# Patient Record
Sex: MALE | Race: ASIAN | NOT HISPANIC OR LATINO
[De-identification: names, ages, dates, MRNs, and addresses within clinical notes are randomized per-mention and may not be internally consistent; named-entity substitution may affect disease eponyms.]

---

## 2020-01-03 PROBLEM — Z00.00 ENCOUNTER FOR PREVENTIVE HEALTH EXAMINATION: Status: ACTIVE | Noted: 2020-01-03

## 2020-01-23 ENCOUNTER — APPOINTMENT (OUTPATIENT)
Dept: THORACIC SURGERY | Facility: CLINIC | Age: 85
End: 2020-01-23
Payer: MEDICARE

## 2020-01-23 VITALS
DIASTOLIC BLOOD PRESSURE: 71 MMHG | WEIGHT: 160 LBS | OXYGEN SATURATION: 95 % | BODY MASS INDEX: 24.53 KG/M2 | RESPIRATION RATE: 18 BRPM | HEIGHT: 67.72 IN | TEMPERATURE: 97.6 F | HEART RATE: 61 BPM | SYSTOLIC BLOOD PRESSURE: 122 MMHG

## 2020-01-23 DIAGNOSIS — Z82.49 FAMILY HISTORY OF ISCHEMIC HEART DISEASE AND OTHER DISEASES OF THE CIRCULATORY SYSTEM: ICD-10-CM

## 2020-01-23 DIAGNOSIS — Z86.79 PERSONAL HISTORY OF OTHER DISEASES OF THE CIRCULATORY SYSTEM: ICD-10-CM

## 2020-01-23 DIAGNOSIS — K21.9 GASTRO-ESOPHAGEAL REFLUX DISEASE W/OUT ESOPHAGITIS: ICD-10-CM

## 2020-01-23 DIAGNOSIS — Z86.59 PERSONAL HISTORY OF OTHER MENTAL AND BEHAVIORAL DISORDERS: ICD-10-CM

## 2020-01-23 DIAGNOSIS — Z86.39 PERSONAL HISTORY OF OTHER ENDOCRINE, NUTRITIONAL AND METABOLIC DISEASE: ICD-10-CM

## 2020-01-23 DIAGNOSIS — Z86.11 PERSONAL HISTORY OF TUBERCULOSIS: ICD-10-CM

## 2020-01-23 DIAGNOSIS — Z87.438 PERSONAL HISTORY OF OTHER DISEASES OF MALE GENITAL ORGANS: ICD-10-CM

## 2020-01-23 PROCEDURE — 99205 OFFICE O/P NEW HI 60 MIN: CPT

## 2020-01-27 ENCOUNTER — APPOINTMENT (OUTPATIENT)
Dept: UROLOGY | Facility: CLINIC | Age: 85
End: 2020-01-27

## 2020-01-27 PROBLEM — Z86.79 HISTORY OF HYPERTENSION: Status: RESOLVED | Noted: 2020-01-27 | Resolved: 2020-01-27

## 2020-01-27 PROBLEM — Z82.49 FAMILY HISTORY OF CARDIAC DISORDER: Status: ACTIVE | Noted: 2020-01-27

## 2020-01-27 PROBLEM — Z86.39 HISTORY OF HYPERLIPIDEMIA: Status: RESOLVED | Noted: 2020-01-27 | Resolved: 2020-01-27

## 2020-01-27 PROBLEM — Z87.438 HISTORY OF BENIGN PROSTATIC HYPERPLASIA: Status: RESOLVED | Noted: 2020-01-27 | Resolved: 2020-01-27

## 2020-01-27 PROBLEM — K21.9 CHRONIC GERD: Status: RESOLVED | Noted: 2020-01-27 | Resolved: 2020-01-27

## 2020-01-27 PROBLEM — Z86.11 HISTORY OF TUBERCULOSIS: Status: RESOLVED | Noted: 2020-01-27 | Resolved: 2020-01-27

## 2020-01-27 PROBLEM — Z86.79 HISTORY OF CARDIAC ARRHYTHMIA: Status: RESOLVED | Noted: 2020-01-27 | Resolved: 2020-01-27

## 2020-01-27 PROBLEM — Z86.59 HISTORY OF DEMENTIA: Status: RESOLVED | Noted: 2020-01-27 | Resolved: 2020-01-27

## 2020-01-27 RX ORDER — MEMANTINE HYDROCHLORIDE 10 MG/1
10 TABLET, FILM COATED ORAL
Refills: 0 | Status: ACTIVE | COMMUNITY

## 2020-01-27 RX ORDER — OMEPRAZOLE 40 MG/1
40 CAPSULE, DELAYED RELEASE ORAL
Refills: 0 | Status: ACTIVE | COMMUNITY

## 2020-01-27 RX ORDER — ALENDRONATE SODIUM 70 MG/1
70 TABLET ORAL
Refills: 0 | Status: ACTIVE | COMMUNITY

## 2020-01-27 RX ORDER — ERGOCALCIFEROL 1.25 MG/1
1.25 MG CAPSULE, LIQUID FILLED ORAL
Refills: 0 | Status: ACTIVE | COMMUNITY

## 2020-01-27 RX ORDER — TAMSULOSIN HYDROCHLORIDE 0.4 MG/1
0.4 CAPSULE ORAL
Refills: 0 | Status: ACTIVE | COMMUNITY

## 2020-01-27 RX ORDER — SIMETHICONE CHEW TAB 80 MG 80 MG
80 TABLET ORAL
Refills: 0 | Status: ACTIVE | COMMUNITY

## 2020-01-27 RX ORDER — GUAIFENESIN AND DEXTROMETHORPHAN HYDROBROMIDE 600; 30 MG/1; MG/1
30-600 TABLET, EXTENDED RELEASE ORAL
Refills: 0 | Status: ACTIVE | COMMUNITY

## 2020-01-27 RX ORDER — BRIMONIDINE TARTRATE 1 MG/ML
0.1 SOLUTION/ DROPS OPHTHALMIC
Refills: 0 | Status: ACTIVE | COMMUNITY

## 2020-01-27 RX ORDER — PRAVASTATIN SODIUM 40 MG/1
40 TABLET ORAL
Refills: 0 | Status: ACTIVE | COMMUNITY

## 2020-01-27 RX ORDER — ATENOLOL 100 MG/1
100 TABLET ORAL
Refills: 0 | Status: ACTIVE | COMMUNITY

## 2020-01-27 RX ORDER — ASPIRIN 81 MG
81 TABLET, DELAYED RELEASE (ENTERIC COATED) ORAL
Refills: 0 | Status: ACTIVE | COMMUNITY

## 2020-01-27 NOTE — ASSESSMENT
[FreeTextEntry1] : Mr. 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), abnormal heart rhythm s/p pacemaker ~2010, who presented to PCP c/o productive cough with large amount of thick yellowish sputum x 1 mo in Nov 2019, sent for CXR, then CT scan, he was prescribed Augmentin 875mg BID x 10 days but did not complete (misunderstood instruction, taking it once a day).\par \par CT Chest on 12/06/2019:\par - 5.3 x 4.0 x 4.3 cm focal consolidation within MYLES, inseparable from the posterior pleural surface and oblique fissure, triangular in shape, with air bronchograms\par - 6 mm LLL nodule (3: 95)\par - calcified right hilar LNs\par - pericardial calcifications, compatible with previous pericarditis \par \par I have reviewed the patient's medical records and diagnostic images at time of this office consultation and have made the following recommendation:\par 1. CT scan reviewed, I recommended patient to complete antibiotic as prescribed, then RTC with a repeat CT Chest w/out contrast in 6-8 wks.\par \par \par I personally performed the services described in the documentation, reviewed the documentation recorded by the scribe in my presence and it accurately and completely records my words and actions.\par \par I, Jeremías Gtz NP, am scribing for and the presence of JOHANNA Proctor, the following sections HISTORY OF PRESENT ILLNESS, PAST MEDICAL/FAMILY/SOCIAL HISTORY; REVIEW OF SYSTEMS; VITAL SIGNS; PHYSICAL EXAM; DISPOSITION.\par \par

## 2020-01-27 NOTE — CONSULT LETTER
[Dear  ___] : Dear  [unfilled], [Consult Letter:] : I had the pleasure of evaluating your patient, [unfilled]. [( Thank you for referring [unfilled] for consultation for _____ )] : Thank you for referring [unfilled] for consultation for [unfilled] [Please see my note below.] : Please see my note below. [Consult Closing:] : Thank you very much for allowing me to participate in the care of this patient.  If you have any questions, please do not hesitate to contact me. [Sincerely,] : Sincerely, [FreeTextEntry2] : Dr. Duvall (PCP/Ref) [FreeTextEntry3] : Sebas Streeter MD, MPH \par System Director of Thoracic Surgery \par Director of Comprehensive Lung and Foregut [place] \par Professor Cardiovascular & Thoracic Surgery \par [facility] [employer] at [facility]\par \par

## 2020-01-27 NOTE — HISTORY OF PRESENT ILLNESS
[FreeTextEntry1] : Mr. 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 (not treated), abnormal heart rhythm s/p pacemaker ~2010, who presented to PCP c/o productive cough with large amount of thick yellowish sputum x 1 mo in Nov 2019, sent for CXR, then CT scan, he was prescribed Augmentin 875mg BID x 10 days but did not complete (misunderstood instruction, taking it once a day).\par \par CT Chest on 12/06/2019:\par - 5.3 x 4.0 x 4.3 cm focal consolidation within MYLES, inseparable from the posterior pleural surface and oblique fissure, triangular in shape, with air bronchograms\par - 6 mm LLL nodule (3: 95)\par - calcified right hilar LNs\par - pericardial calcifications, compatible with previous pericarditis \par \par CXR on 12/30/2019: \par - persistent opacity within the left apex is identified, stable, malignancy cannot be excluded\par - Mild patchy bilateral pulmonary scarring\par - transvenous pacemaker is visualized\par \par Patient is here today for CT surgery consultation, referred by Dr. Duvall (PCP). Admits to productive cough. Denies fever, SOB or CP.\par

## 2020-01-27 NOTE — PHYSICAL EXAM
[General Appearance - Alert] : alert [General Appearance - In No Acute Distress] : in no acute distress [Sclera] : the sclera and conjunctiva were normal [PERRL With Normal Accommodation] : pupils were equal in size, round, and reactive to light [Extraocular Movements] : extraocular movements were intact [Oropharynx] : the oropharynx was normal [Neck Appearance] : the appearance of the neck was normal [Outer Ear] : the ears and nose were normal in appearance [Jugular Venous Distention Increased] : there was no jugular-venous distention [Thyroid Diffuse Enlargement] : the thyroid was not enlarged [Neck Cervical Mass (___cm)] : no neck mass was observed [Thyroid Nodule] : there were no palpable thyroid nodules [Heart Rate And Rhythm] : heart rate was normal and rhythm regular [Heart Sounds] : normal S1 and S2 [Auscultation Breath Sounds / Voice Sounds] : lungs were clear to auscultation bilaterally [Heart Sounds Pericardial Friction Rub] : no pericardial rub [Heart Sounds Gallop] : no gallops [Murmurs] : no murmurs [Examination Of The Chest] : the chest was normal in appearance [Chest Visual Inspection Thoracic Asymmetry] : no chest asymmetry [Diminished Respiratory Excursion] : normal chest expansion [2+] : left 2+ [Breast Appearance] : normal in appearance [Breast Palpation Mass] : no palpable masses [Bowel Sounds] : normal bowel sounds [Abdomen Tenderness] : non-tender [Abdomen Soft] : soft [Abdomen Mass (___ Cm)] : no abdominal mass palpated [Supraclavicular Lymph Nodes Enlarged Bilaterally] : supraclavicular [Cervical Lymph Nodes Enlarged Anterior Bilaterally] : anterior cervical [Cervical Lymph Nodes Enlarged Posterior Bilaterally] : posterior cervical [No Spinal Tenderness] : no spinal tenderness [No CVA Tenderness] : no ~M costovertebral angle tenderness [Nail Clubbing] : no clubbing  or cyanosis of the fingernails [Abnormal Walk] : normal gait [Motor Tone] : muscle strength and tone were normal [Musculoskeletal - Swelling] : no joint swelling seen [Skin Color & Pigmentation] : normal skin color and pigmentation [Deep Tendon Reflexes (DTR)] : deep tendon reflexes were 2+ and symmetric [Skin Turgor] : normal skin turgor [] : no rash [No Focal Deficits] : no focal deficits [Sensation] : the sensory exam was normal to light touch and pinprick [Oriented To Time, Place, And Person] : oriented to person, place, and time [Affect] : the affect was normal [Impaired Insight] : insight and judgment were intact [FreeTextEntry1] : deferred

## 2020-02-27 ENCOUNTER — APPOINTMENT (OUTPATIENT)
Dept: THORACIC SURGERY | Facility: CLINIC | Age: 85
End: 2020-02-27
Payer: MEDICARE

## 2020-02-27 VITALS
SYSTOLIC BLOOD PRESSURE: 123 MMHG | TEMPERATURE: 97.9 F | OXYGEN SATURATION: 95 % | HEART RATE: 72 BPM | DIASTOLIC BLOOD PRESSURE: 71 MMHG | WEIGHT: 162 LBS | RESPIRATION RATE: 18 BRPM | BODY MASS INDEX: 24.84 KG/M2

## 2020-02-27 PROCEDURE — 99213 OFFICE O/P EST LOW 20 MIN: CPT

## 2020-02-28 RX ORDER — TIMOLOL MALEATE 5 MG/ML
0.5 SOLUTION OPHTHALMIC
Qty: 5 | Refills: 0 | Status: COMPLETED | COMMUNITY
Start: 2020-02-05

## 2020-02-28 RX ORDER — FLUOCINOLONE ACETONIDE 0.1 MG/ML
0.01 SOLUTION TOPICAL
Qty: 60 | Refills: 0 | Status: COMPLETED | COMMUNITY
Start: 2020-02-05

## 2020-02-28 RX ORDER — LATANOPROST/PF 0.005 %
0.01 DROPS OPHTHALMIC (EYE)
Qty: 2 | Refills: 0 | Status: COMPLETED | COMMUNITY
Start: 2020-02-19

## 2020-02-28 NOTE — CONSULT LETTER
[Dear  ___] : Dear  [unfilled], [Consult Letter:] : I had the pleasure of evaluating your patient, [unfilled]. [( Thank you for referring [unfilled] for consultation for _____ )] : Thank you for referring [unfilled] for consultation for [unfilled] [Please see my note below.] : Please see my note below. [Consult Closing:] : Thank you very much for allowing me to participate in the care of this patient.  If you have any questions, please do not hesitate to contact me. [Sincerely,] : Sincerely, [FreeTextEntry2] : Dr. Duvall (PCP/Ref)  [FreeTextEntry3] : Sebas Streeter MD, MPH \par System Director of Thoracic Surgery \par Director of Comprehensive Lung and Foregut [place] \par Professor Cardiovascular & Thoracic Surgery  \par [facility] [employer] at [facility]\par

## 2020-02-28 NOTE — HISTORY OF PRESENT ILLNESS
[FreeTextEntry1] : Mr. 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 (not treated), abnormal heart rhythm s/p pacemaker ~2010, who presented to PCP c/o productive cough with large amount of thick yellowish sputum x 1 mo in Nov 2019, sent for CXR, then CT scan, he was prescribed Augmentin 875mg BID x 10 days, completed in 01/2020. \par \par CT Chest on 12/06/2019:\par - 5.3 x 4.0 x 4.3 cm focal consolidation within MYLES, inseparable from the posterior pleural surface and oblique fissure, triangular in shape, with air bronchograms\par - 6 mm LLL nodule (3: 95)\par - calcified right hilar LNs\par - pericardial calcifications, compatible with previous pericarditis \par \par CXR on 12/30/2019: \par - persistent opacity within the left apex is identified, stable, malignancy cannot be excluded\par - Mild patchy bilateral pulmonary scarring\par - transvenous pacemaker is visualized\par \par CT Chest on 2/24/20:\par - there is large area of consolidation with air bronchograms in the posterior Lt apex, 4.8 cm\par - a few stable patchy ggo in RUL\par - stable 6 mm nodule LLL (3:113)\par \par Pt presents today for follow up. Patient c/o cough but improving, denies shortness of breath, chest pain, fever, chills, loss of appetite, weight loss, or hemoptysis. \par

## 2020-02-28 NOTE — DATA REVIEWED
[FreeTextEntry1] : CT Chest on 2/24/20:\par - there is large area of consolidation with air bronchograms in the posterior Lt apex, 4.8 cm\par - a few stable patchy ggo in RUL\par - stable 6 mm nodule LLL (3:113)

## 2020-02-28 NOTE — PHYSICAL EXAM
[Auscultation Breath Sounds / Voice Sounds] : lungs were clear to auscultation bilaterally [Heart Rate And Rhythm] : heart rate was normal and rhythm regular [Heart Sounds Gallop] : no gallops [Heart Sounds] : normal S1 and S2 [Murmurs] : no murmurs [Heart Sounds Pericardial Friction Rub] : no pericardial rub [Examination Of The Chest] : the chest was normal in appearance [Chest Visual Inspection Thoracic Asymmetry] : no chest asymmetry [Diminished Respiratory Excursion] : normal chest expansion [No CVA Tenderness] : no ~M costovertebral angle tenderness [No Spinal Tenderness] : no spinal tenderness [Abnormal Walk] : normal gait [Musculoskeletal - Swelling] : no joint swelling seen [Nail Clubbing] : no clubbing  or cyanosis of the fingernails [Motor Tone] : muscle strength and tone were normal [Skin Color & Pigmentation] : normal skin color and pigmentation [Skin Turgor] : normal skin turgor [] : no rash [Oriented To Time, Place, And Person] : oriented to person, place, and time [Impaired Insight] : insight and judgment were intact [Affect] : the affect was normal

## 2020-02-28 NOTE — ASSESSMENT
[FreeTextEntry1] : Mr. 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 (not treated), abnormal heart rhythm s/p pacemaker ~2010, who presented to PCP c/o productive cough with large amount of thick yellowish sputum x 1 mo in Nov 2019, sent for CXR, then CT scan, he was prescribed Augmentin 875mg BID x 10, completed in 01/2020. \par \par CT Chest on 2/24/20:\par - there is large area of consolidation with air bronchograms in the posterior Lt apex, 4.8 cm\par - a few stable patchy ggo in RUL\par - stable 6 mm nodule LLL (3:113)\par \par I have reviewed the patient's medical records and diagnostic images at time of this office consultation and have made the following recommendation:\par 1. CT chest reviewed, persistent opacity within the left apex after ABX trial, I recommended CT guided core bx of left apex by Dr. Jitendra Pang.\par 2. PET/CT\par 3. PFTs with Dr. Yan Patel. \par \par \par I personally performed the services described in the documentation, reviewed the documentation recorded by the scribe in my presence and it accurately and completely records my words and actions.\par \par I, Marifer Mcneill NP, am scribing for and the presence of JOHANNA Proctor, the following sections HISTORY OF PRESENT ILLNESS, PAST MEDICAL/FAMILY/SOCIAL HISTORY; REVIEW OF SYSTEMS; VITAL SIGNS; PHYSICAL EXAM; DISPOSITION.

## 2020-02-28 NOTE — REASON FOR VISIT
[Follow-Up: _____] : a [unfilled] follow-up visit [Family Member] : family member [FreeTextEntry1] : Lung nodule

## 2020-03-10 ENCOUNTER — APPOINTMENT (OUTPATIENT)
Dept: INTERVENTIONAL RADIOLOGY/VASCULAR | Facility: CLINIC | Age: 85
End: 2020-03-10
Payer: MEDICARE

## 2020-03-10 VITALS
BODY MASS INDEX: 25.76 KG/M2 | HEART RATE: 62 BPM | DIASTOLIC BLOOD PRESSURE: 60 MMHG | RESPIRATION RATE: 17 BRPM | WEIGHT: 170 LBS | OXYGEN SATURATION: 95 % | HEIGHT: 68 IN | SYSTOLIC BLOOD PRESSURE: 115 MMHG

## 2020-03-10 DIAGNOSIS — R91.8 OTHER NONSPECIFIC ABNORMAL FINDING OF LUNG FIELD: ICD-10-CM

## 2020-03-10 PROCEDURE — 99204 OFFICE O/P NEW MOD 45 MIN: CPT

## 2020-03-10 NOTE — PHYSICAL EXAM
[Alert] : alert [Normal Hearing] : hearing was normal [No Respiratory Distress] : no respiratory distress [No Accessory Muscle Use] : no accessory muscle use [Clear to Auscultation] : lungs were clear to auscultation bilaterally [Normal Rate] : heart rate was normal  [Normal Gait] : normal gait [No Tremors] : no tremors [Oriented x3] : oriented to person, place, and time

## 2020-03-10 NOTE — HISTORY OF PRESENT ILLNESS
[FreeTextEntry1] : 85 year old male with history of HTN, HLD, Dementia, + TB  arrhythmia s/p pacemaker 2010.  pt presented to PCP with complains of productive cough with yellow sputum for a month and was sent to have chest xray done that was abnormal and had CT scan done that \par \par pt was prescribed Augmentin for 10 days that he completed in January 2020. pt initially had chest xray done then he had CT scan done in December 2019 that revealed 5.3 focal consolidation within MYLES then pt had repeat CT scan done that revealed "large area of consolidation in the left lung apex which is unchanged. Given the stability and presence of adjacent extrapleural fat deposition this may represent a chromic inflammatory post inflammatory process. however tissue sampling should be considered. stable 6 mm nodule in left lower lobe.  mild patchy pulmonary scarring, stable patchy groundglass opacities in the right upper lobe.\par pt was seen by Ferdinand Grewal and referred the pt to IR for possible left apex mass biopsy. \par \par Denies any recent SOB, CP, fever, chills, n/v/d. Still has the productive cough. \par \par Patient sates she has been feeling well overall. Appetite has been good no unintentional weight loss.\par \par Patient has PPM in place and is on Aspirin. he is aware to get clearance letter from his PCP to hold Aspirin five days prior to procedure. PCP is Dr. Duvall [phone]\par \par \par Patient accompanied by his son and [facility]  was called ID [id number]. Patient declined using the  services and wanted his son to translate for consultation and to sign the consent. This was verified by the  ID [id number].\par

## 2020-03-10 NOTE — ASSESSMENT
[FreeTextEntry1] : PET/CT reviewed with the patient and his son.  Plan is for CT guided core needle biopsy of the left upper lobe mass with anesthesia.  Risks of percutaneous biopsy of , including but not limited to bleeding, pneumothorax requiring a chest tube, infection and injury to surrounding structure, discussed with the patient.  All questions were answered.  Patient elects to proceed with the biopsy.

## 2020-05-07 ENCOUNTER — APPOINTMENT (OUTPATIENT)
Dept: THORACIC SURGERY | Facility: CLINIC | Age: 85
End: 2020-05-07

## 2020-07-19 DIAGNOSIS — Z01.818 ENCOUNTER FOR OTHER PREPROCEDURAL EXAMINATION: ICD-10-CM

## 2020-07-20 ENCOUNTER — APPOINTMENT (OUTPATIENT)
Dept: DISASTER EMERGENCY | Facility: CLINIC | Age: 85
End: 2020-07-20

## 2020-07-21 LAB — SARS-COV-2 N GENE NPH QL NAA+PROBE: NOT DETECTED

## 2020-07-23 ENCOUNTER — RESULT REVIEW (OUTPATIENT)
Age: 85
End: 2020-07-23

## 2020-07-23 ENCOUNTER — OUTPATIENT (OUTPATIENT)
Dept: OUTPATIENT SERVICES | Facility: HOSPITAL | Age: 85
LOS: 1 days | End: 2020-07-23
Payer: MEDICARE

## 2020-07-23 DIAGNOSIS — R91.8 OTHER NONSPECIFIC ABNORMAL FINDING OF LUNG FIELD: ICD-10-CM

## 2020-07-23 PROCEDURE — 88341 IMHCHEM/IMCYTCHM EA ADD ANTB: CPT | Mod: 26

## 2020-07-23 PROCEDURE — 88342 IMHCHEM/IMCYTCHM 1ST ANTB: CPT | Mod: 26

## 2020-07-23 PROCEDURE — 71045 X-RAY EXAM CHEST 1 VIEW: CPT | Mod: 26

## 2020-07-23 PROCEDURE — 88305 TISSUE EXAM BY PATHOLOGIST: CPT | Mod: 26

## 2020-07-23 PROCEDURE — 88173 CYTOPATH EVAL FNA REPORT: CPT | Mod: 26

## 2020-07-23 PROCEDURE — 32405: CPT

## 2020-07-23 PROCEDURE — 77012 CT SCAN FOR NEEDLE BIOPSY: CPT | Mod: 26

## 2020-07-30 DIAGNOSIS — R91.8 OTHER NONSPECIFIC ABNORMAL FINDING OF LUNG FIELD: ICD-10-CM

## 2020-08-06 ENCOUNTER — APPOINTMENT (OUTPATIENT)
Dept: THORACIC SURGERY | Facility: CLINIC | Age: 85
End: 2020-08-06
Payer: MEDICARE

## 2020-08-06 VITALS
DIASTOLIC BLOOD PRESSURE: 75 MMHG | SYSTOLIC BLOOD PRESSURE: 126 MMHG | HEART RATE: 68 BPM | BODY MASS INDEX: 24.33 KG/M2 | OXYGEN SATURATION: 96 % | RESPIRATION RATE: 17 BRPM | WEIGHT: 160 LBS | TEMPERATURE: 97.8 F

## 2020-08-06 PROCEDURE — 99214 OFFICE O/P EST MOD 30 MIN: CPT

## 2020-08-07 NOTE — CONSULT LETTER
[Dear  ___] : Dear  [unfilled], [Please see my note below.] : Please see my note below. [( Thank you for referring [unfilled] for consultation for _____ )] : Thank you for referring [unfilled] for consultation for [unfilled] [Consult Letter:] : I had the pleasure of evaluating your patient, [unfilled]. [Sincerely,] : Sincerely, [Consult Closing:] : Thank you very much for allowing me to participate in the care of this patient.  If you have any questions, please do not hesitate to contact me. [FreeTextEntry2] : Dr. Duvall (PCP/Ref)  [FreeTextEntry3] : Sebas Streeter MD, MPH \par System Director of Thoracic Surgery \par Director of Comprehensive Lung and Foregut [place] \par Professor Cardiovascular & Thoracic Surgery \par [facility] [employer] at [facility]\par \par

## 2020-08-07 NOTE — PHYSICAL EXAM
[Auscultation Breath Sounds / Voice Sounds] : lungs were clear to auscultation bilaterally [Heart Rate And Rhythm] : heart rate was normal and rhythm regular [Heart Sounds Gallop] : no gallops [Murmurs] : no murmurs [Heart Sounds] : normal S1 and S2 [Chest Visual Inspection Thoracic Asymmetry] : no chest asymmetry [Diminished Respiratory Excursion] : normal chest expansion [Examination Of The Chest] : the chest was normal in appearance [Heart Sounds Pericardial Friction Rub] : no pericardial rub [Abdomen Soft] : soft [Bowel Sounds] : normal bowel sounds [Abdomen Tenderness] : non-tender [Abnormal Walk] : normal gait [Abdomen Mass (___ Cm)] : no abdominal mass palpated [Motor Tone] : muscle strength and tone were normal [Nail Clubbing] : no clubbing  or cyanosis of the fingernails [Musculoskeletal - Swelling] : no joint swelling seen [Skin Color & Pigmentation] : normal skin color and pigmentation [Skin Turgor] : normal skin turgor [] : no rash [Deep Tendon Reflexes (DTR)] : deep tendon reflexes were 2+ and symmetric [Oriented To Time, Place, And Person] : oriented to person, place, and time [No Focal Deficits] : no focal deficits [Sensation] : the sensory exam was normal to light touch and pinprick [Affect] : the affect was normal [Impaired Insight] : insight and judgment were intact

## 2020-08-07 NOTE — DATA REVIEWED
[FreeTextEntry1] : CT guided bx of MYLES nodule on 07/23/2020 by Dr. Jitendra Pang. Path of MYLES nodule core bx revealed positive for malignant cells. Non-small cell carcinoma, favor adenocarcinoma. + TTF1, consistent with a primary pulmonary adenocarcinoma. Foundation One is pending. \par \par PET/CT on 08/01/2020:\par - 45 x 38 mm mass in the posterior left lung apex (image 45) extending to the pleura, attributed to a combination of malignancy and scar with SUV 16.9, w/o significant change\par - a new 5 mm nodule adjacent to the right minor fissure anterolaterally (image 67)\par - a few areas of subsegmental atelectasis and/or scarring in both lungs\par - there are stable areas of pericardial calcification attributed to prior sites of chronic inflammation\par - there a few stable enlarged mediastinal nodes w/o malignant activity on PET, for example 15 mm in the left retrosternal region (image 50)\par - SUV 2.2 activity in the right iliac bone near the SI joint (image 132), w/o significant change from 03/05/2020 and w/o CT abnormality\par - there are minimal gallstones and mild fatty infiltration of the liver, new, as well as stable severe prostate gland enlargement at 74 mm\par - 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

## 2020-08-07 NOTE — HISTORY OF PRESENT ILLNESS
[FreeTextEntry1] : Mr. 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 (not treated), abnormal heart rhythm s/p pacemaker ~2010, who presented to PCP c/o productive cough with large amount of thick yellowish sputum x 1 mo in Nov 2019, sent for CXR, then CT scan, he was prescribed Augmentin 875mg BID x 10 days, completed in 01/2020. \par \par CT Chest on 12/06/2019:\par - 5.3 x 4.0 x 4.3 cm focal consolidation within MYLES, inseparable from the posterior pleural surface and oblique fissure, triangular in shape, with air bronchograms\par - 6 mm LLL nodule (3: 95)\par - calcified right hilar LNs\par - pericardial calcifications, compatible with previous pericarditis \par \par CXR on 12/30/2019: \par - persistent opacity within the left apex is identified, stable, malignancy cannot be excluded\par - Mild patchy bilateral pulmonary scarring\par - transvenous pacemaker is visualized\par \par CT Chest on 2/24/20:\par - there is large area of consolidation with air bronchograms in the posterior Lt apex, 4.8 cm\par - a few stable patchy ggo in RUL\par - stable 6 mm nodule LLL (3:113)\par \par PFTs on 03/04/2020: %, FEV1 102%, DLCO 67% \par \par PET/CT on 03/05/2020:\par - mild activity noted within the medial aspect of the right iliac bone with SUV 2.2 (image 135)\par - asymmetric medial deviation of the right vocal cord, with SUV 2.5\par - 2.8 x 3.5 cm mass in the posterior segment of the MYLES (image 50) with SUV 16.3 in the anterior aspect of the lesion\par - 6 mm LLL nodule is difficult to discern on this study secondary to atelectasis\par - some hypermetabolic activity with SUV of 2.8 is noted in the left hilum. The findings may be inflammatory in nature\par - the ascending aorta is mildly dilated, up to 4.2 cm \par - there is marked prostatic enlargement, with the gland measuring 7.5 cm \par \par Of note, Last seen patient on 02/27/2020, I recommended a CT guided bx of Left apex, PET/CT, and PFTs. Patient was seen by Dr. Jitendra Pang on 03/10/2020 and plan for a bx. Patient called back in April and preferred to postponed his appointment due to COVID-19 crisis. \par \par Patient is now s/p CT guided bx of MYLES nodule on 07/23/2020 by Dr. Jitendra Pang. Path of MYLES nodule core bx revealed positive for malignant cells. Non-small cell carcinoma, favor adenocarcinoma. + TTF1, consistent with a primary pulmonary adenocarcinoma. Foundation One is pending. \par \par PET/CT on 08/01/2020:\par - 45 x 38 mm mass in the posterior left lung apex (image 45) extending to the pleura, attributed to a combination of malignancy and scar with SUV 16.9, w/o significant change\par - a new 5 mm nodule adjacent to the right minor fissure anterolaterally (image 67)\par - a few areas of subsegmental atelectasis and/or scarring in both lungs\par - there are stable areas of pericardial calcification attributed to prior sites of chronic inflammation\par - there a few stable enlarged mediastinal nodes w/o malignant activity on PET, for example 15 mm in the left retrosternal region (image 50)\par - SUV 2.2 activity in the right iliac bone near the SI joint (image 132), w/o significant change from 03/05/2020 and w/o CT abnormality\par - there are minimal gallstones and mild fatty infiltration of the liver, new, as well as stable severe prostate gland enlargement at 74 mm\par - 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

## 2020-08-07 NOTE — ASSESSMENT
[FreeTextEntry1] : Mr. 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 (not treated), abnormal heart rhythm s/p pacemaker ~2010, who presented to PCP c/o productive cough with large amount of thick yellowish sputum x 1 mo in Nov 2019, sent for CXR, then CT scan, he was prescribed Augmentin 875mg BID x 10 days, completed in 01/2020. \par \par CT Chest on 12/06/2019:\par - 5.3 x 4.0 x 4.3 cm focal consolidation within MYLES, inseparable from the posterior pleural surface and oblique fissure, triangular in shape, with air bronchograms\par - 6 mm LLL nodule (3: 95)\par - calcified right hilar LNs\par - pericardial calcifications, compatible with previous pericarditis \par \par CT Chest on 2/24/20:\par - there is large area of consolidation with air bronchograms in the posterior Lt apex, 4.8 cm\par - a few stable patchy ggo in RUL\par - stable 6 mm nodule LLL (3:113)\par \par PFTs on 03/04/2020: %, FEV1 102%, DLCO 67% \par \par PET/CT on 03/05/2020:\par - mild activity noted within the medial aspect of the right iliac bone with SUV 2.2 (image 135)\par - asymmetric medial deviation of the right vocal cord, with SUV 2.5\par - 2.8 x 3.5 cm mass in the posterior segment of the MYLES (image 50) with SUV 16.3 in the anterior aspect of the lesion\par - 6 mm LLL nodule is difficult to discern on this study secondary to atelectasis\par - some hypermetabolic activity with SUV of 2.8 is noted in the left hilum. The findings may be inflammatory in nature\par - the ascending aorta is mildly dilated, up to 4.2 cm \par - there is marked prostatic enlargement, with the gland measuring 7.5 cm \par \par Of note, Last seen patient on 02/27/2020, I recommended a CT guided bx of Left apex, PET/CT, and PFTs. Patient was seen by Dr. Jitendra Pang on 03/10/2020 and plan for a bx. Patient called back in April and preferred to postponed his appointment due to COVID-19 crisis. \par \par Now s/p CT guided bx of MYLES nodule on 07/23/2020 by Dr. Jitendra Pang. Path of MYLES nodule core bx revealed positive for malignant cells. Non-small cell carcinoma, favor adenocarcinoma. + TTF1, consistent with a primary pulmonary adenocarcinoma. [facility] One is pending. \par \par PET/CT on 08/01/2020:\par - 45 x 38 mm mass in the posterior left lung apex (image 45) extending to the pleura, attributed to a combination of malignancy and scar with SUV 16.9, w/o significant change\par - a new 5 mm nodule adjacent to the right minor fissure anterolaterally (image 67)\par - a few areas of subsegmental atelectasis and/or scarring in both lungs\par - there are stable areas of pericardial calcification attributed to prior sites of chronic inflammation\par - there a few stable enlarged mediastinal nodes w/o malignant activity on PET, for example 15 mm in the left retrosternal region (image 50)\par - SUV 2.2 activity in the right iliac bone near the SI joint (image 132), w/o significant change from 03/05/2020 and w/o CT abnormality\par - there are minimal gallstones and mild fatty infiltration of the liver, new, as well as stable severe prostate gland enlargement at 74 mm\par - 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 Path reviewed with pt. Biopsy proven MYLES mass [facility], I recommended Lt VATS, robotic-assisted, LULobectomy, MLND on 8/25/20. Risks and benefits and alternatives explained to patient, all questions answered, patient agreed to proceed with surgery.\par 2. Medical and Cardiac clearance\par 3. COVID testing\par 4. MRI brain\par \par \par I personally performed the services described in the documentation, reviewed the documentation recorded by the scribe in my presence and it accurately and completely records my words and actions. \par \par I, Bette aCusey NP, am scribing for and the presence of Dr. Sebas Streeter the following sections, HISTORY OF PRESENT ILLNESS, PAST MEDICAL/FAMILY/SOCIAL HISTORY; REVIEW OF SYSTEMS; VITAL SIGNS; PHYSICAL EXAM; DISPOSITION.\par

## 2020-08-18 ENCOUNTER — OUTPATIENT (OUTPATIENT)
Dept: OUTPATIENT SERVICES | Facility: HOSPITAL | Age: 85
LOS: 1 days | End: 2020-08-18
Payer: MEDICARE

## 2020-08-18 VITALS
DIASTOLIC BLOOD PRESSURE: 59 MMHG | SYSTOLIC BLOOD PRESSURE: 130 MMHG | WEIGHT: 166.01 LBS | HEART RATE: 59 BPM | RESPIRATION RATE: 16 BRPM | OXYGEN SATURATION: 98 % | TEMPERATURE: 98 F | HEIGHT: 68 IN

## 2020-08-18 DIAGNOSIS — C34.92 MALIGNANT NEOPLASM OF UNSPECIFIED PART OF LEFT BRONCHUS OR LUNG: ICD-10-CM

## 2020-08-18 DIAGNOSIS — Z95.0 PRESENCE OF CARDIAC PACEMAKER: Chronic | ICD-10-CM

## 2020-08-18 DIAGNOSIS — Z98.890 OTHER SPECIFIED POSTPROCEDURAL STATES: Chronic | ICD-10-CM

## 2020-08-18 DIAGNOSIS — Z96.652 PRESENCE OF LEFT ARTIFICIAL KNEE JOINT: Chronic | ICD-10-CM

## 2020-08-18 LAB
ANION GAP SERPL CALC-SCNC: 9 MMO/L — SIGNIFICANT CHANGE UP (ref 7–14)
BLD GP AB SCN SERPL QL: NEGATIVE — SIGNIFICANT CHANGE UP
BUN SERPL-MCNC: 17 MG/DL — SIGNIFICANT CHANGE UP (ref 7–23)
CALCIUM SERPL-MCNC: 9.4 MG/DL — SIGNIFICANT CHANGE UP (ref 8.4–10.5)
CHLORIDE SERPL-SCNC: 105 MMOL/L — SIGNIFICANT CHANGE UP (ref 98–107)
CO2 SERPL-SCNC: 26 MMOL/L — SIGNIFICANT CHANGE UP (ref 22–31)
CREAT SERPL-MCNC: 0.92 MG/DL — SIGNIFICANT CHANGE UP (ref 0.5–1.3)
GLUCOSE SERPL-MCNC: 107 MG/DL — HIGH (ref 70–99)
HCT VFR BLD CALC: 47 % — SIGNIFICANT CHANGE UP (ref 39–50)
HGB BLD-MCNC: 14.9 G/DL — SIGNIFICANT CHANGE UP (ref 13–17)
MCHC RBC-ENTMCNC: 30.7 PG — SIGNIFICANT CHANGE UP (ref 27–34)
MCHC RBC-ENTMCNC: 31.7 % — LOW (ref 32–36)
MCV RBC AUTO: 96.7 FL — SIGNIFICANT CHANGE UP (ref 80–100)
NRBC # FLD: 0 K/UL — SIGNIFICANT CHANGE UP (ref 0–0)
PLATELET # BLD AUTO: 166 K/UL — SIGNIFICANT CHANGE UP (ref 150–400)
PMV BLD: 10.2 FL — SIGNIFICANT CHANGE UP (ref 7–13)
POTASSIUM SERPL-MCNC: 4.6 MMOL/L — SIGNIFICANT CHANGE UP (ref 3.5–5.3)
POTASSIUM SERPL-SCNC: 4.6 MMOL/L — SIGNIFICANT CHANGE UP (ref 3.5–5.3)
RBC # BLD: 4.86 M/UL — SIGNIFICANT CHANGE UP (ref 4.2–5.8)
RBC # FLD: 11.9 % — SIGNIFICANT CHANGE UP (ref 10.3–14.5)
RH IG SCN BLD-IMP: POSITIVE — SIGNIFICANT CHANGE UP
SODIUM SERPL-SCNC: 140 MMOL/L — SIGNIFICANT CHANGE UP (ref 135–145)
WBC # BLD: 6.2 K/UL — SIGNIFICANT CHANGE UP (ref 3.8–10.5)
WBC # FLD AUTO: 6.2 K/UL — SIGNIFICANT CHANGE UP (ref 3.8–10.5)

## 2020-08-18 PROCEDURE — 93010 ELECTROCARDIOGRAM REPORT: CPT

## 2020-08-18 RX ORDER — SODIUM CHLORIDE 9 MG/ML
1000 INJECTION, SOLUTION INTRAVENOUS
Refills: 0 | Status: DISCONTINUED | OUTPATIENT
Start: 2020-08-25 | End: 2020-08-25

## 2020-08-18 NOTE — H&P PST ADULT - HISTORY OF PRESENT ILLNESS
85  year old male presents to presurgical testing with diagnosis of malignant neoplasm of unspecified part of left bronchus or lung scheduled for left video assisted thoracoscopy robotic assisted left upper lobectomy mediastinal lymph node dissection. 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

## 2020-08-18 NOTE — H&P PST ADULT - NEGATIVE NEUROLOGICAL SYMPTOMS
no paresthesias/no difficulty walking/no transient paralysis/no weakness/no syncope/no tremors/no headache/no generalized seizures no syncope/no headache/no weakness/no transient paralysis/no paresthesias/no generalized seizures/no tremors

## 2020-08-18 NOTE — H&P PST ADULT - NEGATIVE OPHTHALMOLOGIC SYMPTOMS
no diplopia/no pain L/no blurred vision L/no pain R/no loss of vision L/no blurred vision R/no loss of vision R/no photophobia

## 2020-08-18 NOTE — H&P PST ADULT - NSICDXPASTSURGICALHX_GEN_ALL_CORE_FT
PAST SURGICAL HISTORY:  History of lung biopsy     Pacemaker     S/P total knee replacement, left PAST SURGICAL HISTORY:  History of lung biopsy     Pacemaker [employer]  model [id number]  serial [id number]  date 7/1/2014    S/P total knee replacement, left

## 2020-08-18 NOTE — H&P PST ADULT - NSICDXPASTMEDICALHX_GEN_ALL_CORE_FT
PAST MEDICAL HISTORY:  Arthritis     Dementia     Glaucoma     History of BPH     Hyperlipidemia     Hypertension     Malignant neoplasm of unspecified part of left bronchus or lung     Tuberculosis

## 2020-08-18 NOTE — H&P PST ADULT - SOURCE OF INFORMATION, PROFILE
Pt is Mandarin speaking. Accompanied by Son Kirby Ndiaye. Pt offered hospital  but declined and preferred son to interpret/patient

## 2020-08-18 NOTE — H&P PST ADULT - NSANTHOSAYNRD_GEN_A_CORE
No. JENIFFER screening performed.  STOP BANG Legend: 0-2 = LOW Risk; 3-4 = INTERMEDIATE Risk; 5-8 = HIGH Risk

## 2020-08-18 NOTE — H&P PST ADULT - NSICDXPROBLEM_GEN_ALL_CORE_FT
PROBLEM DIAGNOSES  Problem: Malignant neoplasm of unspecified part of left bronchus or lung  Assessment and Plan: Pt is tentatively scheduled for left video assisted thoracoscopy robotic assisted left upper lobectomy mediastinal lymph node dissection for 8/25/20. Pre-op instructions provided to patient and son. Pt and son given verbal and written instructions with teach back on chlorhexidine shampoo. Pt will take own omeprazole on day of procedure. Pt and son verbalized understanding with return demonstration.   JENIFFER precautions, OR booking notified   COVID test scheduled for 8/22  Cardiac clearance with ekg labs echo stress test in chart.    Problem: Pacemaker  Assessment and Plan: Patient does not have card. Instructed to bring card on day of procdure. Pacemaker information obtained from cardiologist office.  OR booking notified.    Problem: TB (tuberculosis)  Assessment and Plan: Spoke to Jayna from Dr Streeter's office. Pt has a old history of TB, never treated, s/p lung biopsy.  OR booking notified of airborne precautions r/t to tuberculosis.     Problem: Hypertension  Assessment and Plan: Pt instructed to take atenolol on the morning of procedure.

## 2020-08-22 ENCOUNTER — APPOINTMENT (OUTPATIENT)
Dept: DISASTER EMERGENCY | Facility: CLINIC | Age: 85
End: 2020-08-22

## 2020-08-22 DIAGNOSIS — Z01.818 ENCOUNTER FOR OTHER PREPROCEDURAL EXAMINATION: ICD-10-CM

## 2020-08-23 LAB — SARS-COV-2 N GENE NPH QL NAA+PROBE: NOT DETECTED

## 2020-08-24 NOTE — ASU PATIENT PROFILE, ADULT - PMH
Arthritis    Dementia    Glaucoma    History of BPH    Hyperlipidemia    Hypertension    Malignant neoplasm of unspecified part of left bronchus or lung    Tuberculosis

## 2020-08-24 NOTE — ASU PATIENT PROFILE, ADULT - PSH
History of lung biopsy    Pacemaker  [employer]  model [id number]  serial [id number]  date 7/1/2014  S/P total knee replacement, left

## 2020-08-25 ENCOUNTER — RESULT REVIEW (OUTPATIENT)
Age: 85
End: 2020-08-25

## 2020-08-25 ENCOUNTER — TRANSCRIPTION ENCOUNTER (OUTPATIENT)
Age: 85
End: 2020-08-25

## 2020-08-25 ENCOUNTER — APPOINTMENT (OUTPATIENT)
Dept: THORACIC SURGERY | Facility: HOSPITAL | Age: 85
End: 2020-08-25

## 2020-08-25 ENCOUNTER — INPATIENT (INPATIENT)
Facility: HOSPITAL | Age: 85
LOS: 11 days | Discharge: HOME HEALTH | End: 2020-09-06
Attending: THORACIC SURGERY (CARDIOTHORACIC VASCULAR SURGERY) | Admitting: THORACIC SURGERY (CARDIOTHORACIC VASCULAR SURGERY)
Payer: MEDICARE

## 2020-08-25 VITALS
SYSTOLIC BLOOD PRESSURE: 135 MMHG | DIASTOLIC BLOOD PRESSURE: 71 MMHG | RESPIRATION RATE: 18 BRPM | TEMPERATURE: 98 F | WEIGHT: 166.01 LBS | OXYGEN SATURATION: 94 % | HEART RATE: 61 BPM | HEIGHT: 68 IN

## 2020-08-25 DIAGNOSIS — Z95.0 PRESENCE OF CARDIAC PACEMAKER: Chronic | ICD-10-CM

## 2020-08-25 DIAGNOSIS — Z95.0 PRESENCE OF CARDIAC PACEMAKER: ICD-10-CM

## 2020-08-25 DIAGNOSIS — C34.92 MALIGNANT NEOPLASM OF UNSPECIFIED PART OF LEFT BRONCHUS OR LUNG: ICD-10-CM

## 2020-08-25 DIAGNOSIS — Z96.652 PRESENCE OF LEFT ARTIFICIAL KNEE JOINT: Chronic | ICD-10-CM

## 2020-08-25 DIAGNOSIS — I10 ESSENTIAL (PRIMARY) HYPERTENSION: ICD-10-CM

## 2020-08-25 DIAGNOSIS — Z98.890 OTHER SPECIFIED POSTPROCEDURAL STATES: Chronic | ICD-10-CM

## 2020-08-25 DIAGNOSIS — A15.9 RESPIRATORY TUBERCULOSIS UNSPECIFIED: ICD-10-CM

## 2020-08-25 LAB
ANION GAP SERPL CALC-SCNC: 16 MMO/L — HIGH (ref 7–14)
APTT BLD: 25.6 SEC — LOW (ref 27–36.3)
BASOPHILS # BLD AUTO: 0.02 K/UL — SIGNIFICANT CHANGE UP (ref 0–0.2)
BASOPHILS NFR BLD AUTO: 0.1 % — SIGNIFICANT CHANGE UP (ref 0–2)
BUN SERPL-MCNC: 16 MG/DL — SIGNIFICANT CHANGE UP (ref 7–23)
CALCIUM SERPL-MCNC: 8.1 MG/DL — LOW (ref 8.4–10.5)
CHLORIDE SERPL-SCNC: 106 MMOL/L — SIGNIFICANT CHANGE UP (ref 98–107)
CO2 SERPL-SCNC: 17 MMOL/L — LOW (ref 22–31)
CREAT SERPL-MCNC: 1.02 MG/DL — SIGNIFICANT CHANGE UP (ref 0.5–1.3)
EOSINOPHIL # BLD AUTO: 0.01 K/UL — SIGNIFICANT CHANGE UP (ref 0–0.5)
EOSINOPHIL NFR BLD AUTO: 0.1 % — SIGNIFICANT CHANGE UP (ref 0–6)
GLUCOSE SERPL-MCNC: 200 MG/DL — HIGH (ref 70–99)
HCT VFR BLD CALC: 41.4 % — SIGNIFICANT CHANGE UP (ref 39–50)
HGB BLD-MCNC: 14 G/DL — SIGNIFICANT CHANGE UP (ref 13–17)
IMM GRANULOCYTES NFR BLD AUTO: 0.6 % — SIGNIFICANT CHANGE UP (ref 0–1.5)
INR BLD: 1.05 — SIGNIFICANT CHANGE UP (ref 0.88–1.16)
LYMPHOCYTES # BLD AUTO: 0.62 K/UL — LOW (ref 1–3.3)
LYMPHOCYTES # BLD AUTO: 4.1 % — LOW (ref 13–44)
MCHC RBC-ENTMCNC: 32.3 PG — SIGNIFICANT CHANGE UP (ref 27–34)
MCHC RBC-ENTMCNC: 33.8 % — SIGNIFICANT CHANGE UP (ref 32–36)
MCV RBC AUTO: 95.4 FL — SIGNIFICANT CHANGE UP (ref 80–100)
MONOCYTES # BLD AUTO: 0.8 K/UL — SIGNIFICANT CHANGE UP (ref 0–0.9)
MONOCYTES NFR BLD AUTO: 5.3 % — SIGNIFICANT CHANGE UP (ref 2–14)
NEUTROPHILS # BLD AUTO: 13.56 K/UL — HIGH (ref 1.8–7.4)
NEUTROPHILS NFR BLD AUTO: 89.8 % — HIGH (ref 43–77)
NRBC # FLD: 0 K/UL — SIGNIFICANT CHANGE UP (ref 0–0)
PLATELET # BLD AUTO: 161 K/UL — SIGNIFICANT CHANGE UP (ref 150–400)
PMV BLD: 10 FL — SIGNIFICANT CHANGE UP (ref 7–13)
POTASSIUM SERPL-MCNC: 4.6 MMOL/L — SIGNIFICANT CHANGE UP (ref 3.5–5.3)
POTASSIUM SERPL-SCNC: 4.6 MMOL/L — SIGNIFICANT CHANGE UP (ref 3.5–5.3)
PROTHROM AB SERPL-ACNC: 12.1 SEC — SIGNIFICANT CHANGE UP (ref 10.6–13.6)
RBC # BLD: 4.34 M/UL — SIGNIFICANT CHANGE UP (ref 4.2–5.8)
RBC # FLD: 12.3 % — SIGNIFICANT CHANGE UP (ref 10.3–14.5)
RH IG SCN BLD-IMP: POSITIVE — SIGNIFICANT CHANGE UP
SODIUM SERPL-SCNC: 139 MMOL/L — SIGNIFICANT CHANGE UP (ref 135–145)
WBC # BLD: 15.1 K/UL — HIGH (ref 3.8–10.5)
WBC # FLD AUTO: 15.1 K/UL — HIGH (ref 3.8–10.5)

## 2020-08-25 PROCEDURE — 32674 THORACOSCOPY LYMPH NODE EXC: CPT

## 2020-08-25 PROCEDURE — 71045 X-RAY EXAM CHEST 1 VIEW: CPT | Mod: 26

## 2020-08-25 PROCEDURE — 99233 SBSQ HOSP IP/OBS HIGH 50: CPT

## 2020-08-25 PROCEDURE — 88332 PATH CONSLTJ SURG EA ADD BLK: CPT | Mod: 26

## 2020-08-25 PROCEDURE — 32656 THORACOSCOPY W/PLEURECTOMY: CPT

## 2020-08-25 PROCEDURE — 32663 THORACOSCOPY W/LOBECTOMY: CPT | Mod: AS,LT

## 2020-08-25 PROCEDURE — 88309 TISSUE EXAM BY PATHOLOGIST: CPT | Mod: 26

## 2020-08-25 PROCEDURE — 32666 THORACOSCOPY W/WEDGE RESECT: CPT | Mod: 59,LT

## 2020-08-25 PROCEDURE — 88313 SPECIAL STAINS GROUP 2: CPT | Mod: 26

## 2020-08-25 PROCEDURE — 32652 THORACOSCOPY REM TOTL CORTEX: CPT | Mod: 22

## 2020-08-25 PROCEDURE — 32674 THORACOSCOPY LYMPH NODE EXC: CPT | Mod: AS

## 2020-08-25 PROCEDURE — 32666 THORACOSCOPY W/WEDGE RESECT: CPT | Mod: AS,59,LT

## 2020-08-25 PROCEDURE — 32656 THORACOSCOPY W/PLEURECTOMY: CPT | Mod: AS

## 2020-08-25 PROCEDURE — 88305 TISSUE EXAM BY PATHOLOGIST: CPT | Mod: 26

## 2020-08-25 PROCEDURE — 32652 THORACOSCOPY REM TOTL CORTEX: CPT | Mod: AS,22

## 2020-08-25 PROCEDURE — 88331 PATH CONSLTJ SURG 1 BLK 1SPC: CPT | Mod: 26

## 2020-08-25 PROCEDURE — 32663 THORACOSCOPY W/LOBECTOMY: CPT | Mod: LT

## 2020-08-25 PROCEDURE — 93280 PM DEVICE PROGR EVAL DUAL: CPT | Mod: 26

## 2020-08-25 PROCEDURE — S2900 ROBOTIC SURGICAL SYSTEM: CPT | Mod: NC

## 2020-08-25 RX ORDER — ATORVASTATIN CALCIUM 80 MG/1
10 TABLET, FILM COATED ORAL AT BEDTIME
Refills: 0 | Status: DISCONTINUED | OUTPATIENT
Start: 2020-08-25 | End: 2020-09-06

## 2020-08-25 RX ORDER — GABAPENTIN 400 MG/1
100 CAPSULE ORAL ONCE
Refills: 0 | Status: COMPLETED | OUTPATIENT
Start: 2020-08-25 | End: 2020-08-25

## 2020-08-25 RX ORDER — ACETAMINOPHEN 500 MG
1000 TABLET ORAL ONCE
Refills: 0 | Status: COMPLETED | OUTPATIENT
Start: 2020-08-26 | End: 2020-08-26

## 2020-08-25 RX ORDER — NALOXONE HYDROCHLORIDE 4 MG/.1ML
0.1 SPRAY NASAL
Refills: 0 | Status: DISCONTINUED | OUTPATIENT
Start: 2020-08-25 | End: 2020-09-06

## 2020-08-25 RX ORDER — HEPARIN SODIUM 5000 [USP'U]/ML
5000 INJECTION INTRAVENOUS; SUBCUTANEOUS ONCE
Refills: 0 | Status: COMPLETED | OUTPATIENT
Start: 2020-08-25 | End: 2020-08-25

## 2020-08-25 RX ORDER — HEPARIN SODIUM 5000 [USP'U]/ML
5000 INJECTION INTRAVENOUS; SUBCUTANEOUS EVERY 8 HOURS
Refills: 0 | Status: DISCONTINUED | OUTPATIENT
Start: 2020-08-25 | End: 2020-08-29

## 2020-08-25 RX ORDER — FAMOTIDINE 10 MG/ML
20 INJECTION INTRAVENOUS EVERY 12 HOURS
Refills: 0 | Status: DISCONTINUED | OUTPATIENT
Start: 2020-08-25 | End: 2020-08-25

## 2020-08-25 RX ORDER — ONDANSETRON 8 MG/1
4 TABLET, FILM COATED ORAL EVERY 6 HOURS
Refills: 0 | Status: DISCONTINUED | OUTPATIENT
Start: 2020-08-25 | End: 2020-09-06

## 2020-08-25 RX ORDER — SENNA PLUS 8.6 MG/1
2 TABLET ORAL AT BEDTIME
Refills: 0 | Status: DISCONTINUED | OUTPATIENT
Start: 2020-08-25 | End: 2020-09-02

## 2020-08-25 RX ORDER — DIPHENHYDRAMINE HCL 50 MG
25 CAPSULE ORAL EVERY 4 HOURS
Refills: 0 | Status: DISCONTINUED | OUTPATIENT
Start: 2020-08-25 | End: 2020-08-26

## 2020-08-25 RX ORDER — ACETAMINOPHEN 500 MG
1000 TABLET ORAL ONCE
Refills: 0 | Status: COMPLETED | OUTPATIENT
Start: 2020-08-25 | End: 2020-08-25

## 2020-08-25 RX ORDER — POLYETHYLENE GLYCOL 3350 17 G/17G
17 POWDER, FOR SOLUTION ORAL DAILY
Refills: 0 | Status: DISCONTINUED | OUTPATIENT
Start: 2020-08-25 | End: 2020-09-02

## 2020-08-25 RX ORDER — METOPROLOL TARTRATE 50 MG
2.5 TABLET ORAL EVERY 6 HOURS
Refills: 0 | Status: COMPLETED | OUTPATIENT
Start: 2020-08-25 | End: 2020-08-26

## 2020-08-25 RX ORDER — LIDOCAINE 4 G/100G
1 CREAM TOPICAL ONCE
Refills: 0 | Status: COMPLETED | OUTPATIENT
Start: 2020-08-25 | End: 2020-08-26

## 2020-08-25 RX ORDER — HYDROMORPHONE HYDROCHLORIDE 2 MG/ML
30 INJECTION INTRAMUSCULAR; INTRAVENOUS; SUBCUTANEOUS
Refills: 0 | Status: DISCONTINUED | OUTPATIENT
Start: 2020-08-25 | End: 2020-08-26

## 2020-08-25 RX ORDER — SODIUM CHLORIDE 9 MG/ML
500 INJECTION, SOLUTION INTRAVENOUS
Refills: 0 | Status: DISCONTINUED | OUTPATIENT
Start: 2020-08-25 | End: 2020-08-28

## 2020-08-25 RX ORDER — PANTOPRAZOLE SODIUM 20 MG/1
40 TABLET, DELAYED RELEASE ORAL
Refills: 0 | Status: DISCONTINUED | OUTPATIENT
Start: 2020-08-25 | End: 2020-09-06

## 2020-08-25 RX ORDER — ACETAMINOPHEN 500 MG
975 TABLET ORAL ONCE
Refills: 0 | Status: COMPLETED | OUTPATIENT
Start: 2020-08-25 | End: 2020-08-25

## 2020-08-25 RX ORDER — ASPIRIN/CALCIUM CARB/MAGNESIUM 324 MG
81 TABLET ORAL DAILY
Refills: 0 | Status: DISCONTINUED | OUTPATIENT
Start: 2020-08-25 | End: 2020-08-25

## 2020-08-25 RX ORDER — TAMSULOSIN HYDROCHLORIDE 0.4 MG/1
0.4 CAPSULE ORAL AT BEDTIME
Refills: 0 | Status: DISCONTINUED | OUTPATIENT
Start: 2020-08-25 | End: 2020-09-06

## 2020-08-25 RX ORDER — HYDROMORPHONE HYDROCHLORIDE 2 MG/ML
0.5 INJECTION INTRAMUSCULAR; INTRAVENOUS; SUBCUTANEOUS
Refills: 0 | Status: DISCONTINUED | OUTPATIENT
Start: 2020-08-25 | End: 2020-08-26

## 2020-08-25 RX ORDER — TIMOLOL 0.5 %
1 DROPS OPHTHALMIC (EYE) EVERY 12 HOURS
Refills: 0 | Status: DISCONTINUED | OUTPATIENT
Start: 2020-08-25 | End: 2020-09-06

## 2020-08-25 RX ADMIN — Medication 2.5 MILLIGRAM(S): at 20:15

## 2020-08-25 RX ADMIN — HEPARIN SODIUM 5000 UNIT(S): 5000 INJECTION INTRAVENOUS; SUBCUTANEOUS at 14:07

## 2020-08-25 RX ADMIN — Medication 400 MILLIGRAM(S): at 20:32

## 2020-08-25 RX ADMIN — HYDROMORPHONE HYDROCHLORIDE 30 MILLILITER(S): 2 INJECTION INTRAMUSCULAR; INTRAVENOUS; SUBCUTANEOUS at 23:09

## 2020-08-25 RX ADMIN — GABAPENTIN 100 MILLIGRAM(S): 400 CAPSULE ORAL at 13:12

## 2020-08-25 RX ADMIN — Medication 975 MILLIGRAM(S): at 14:07

## 2020-08-25 RX ADMIN — Medication 1000 MILLIGRAM(S): at 21:00

## 2020-08-25 RX ADMIN — HEPARIN SODIUM 5000 UNIT(S): 5000 INJECTION INTRAVENOUS; SUBCUTANEOUS at 23:34

## 2020-08-25 NOTE — PROGRESS NOTE ADULT - SUBJECTIVE AND OBJECTIVE BOX
DOMENIC HAN      85y   Male   MRN-[id number]         fish (Other (Mild))  No Known Drug Allergies  shellfish (Other (Mild))             Daily Height in cm: 172.72 (25 Aug 2020 12:58)    Daily Drug Dosing Weight  Height (cm): 172.72 (25 Aug 2020 13:10)  Weight (kg): 75.3 (25 Aug 2020 13:10)  BMI (kg/m2): 25.2 (25 Aug 2020 13:10)  BSA (m2): 1.89 (25 Aug 2020 13:10)    HPI:  85  year old male presents to presurgical testing with diagnosis of malignant neoplasm of unspecified part of left bronchus or lung scheduled for left video assisted thoracoscopy robotic assisted left upper lobectomy mediastinal lymph node dissection. 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 (18 Aug 2020 14:08)      Procedure: Flexible bronchoscopy, left robotic assisted VATS, pneumolysis, left upper lobectomy, partial pleurectomy and mediastinal lymph node dissection 8/25/20    Issues:                 Home Medications:  aspirin 81 mg oral tablet: 1 tab(s) orally once a day (25 Aug 2020 13:41)  atenolol 100 mg oral tablet: 1 tab(s) orally once a day (25 Aug 2020 13:41)  omeprazole 40 mg oral delayed release capsule: 1 cap(s) orally once a day (25 Aug 2020 13:41)  pravastatin 40 mg oral tablet: 1 tab(s) orally once a day (25 Aug 2020 13:41)  tamsulosin 0.4 mg oral capsule: 1 cap(s) orally once a day (25 Aug 2020 13:41)  timolol hemihydrate 0.5% ophthalmic solution: 1 drop(s) to each affected eye 2 times a day (25 Aug 2020 13:41)  Vitamin B Complex 100: 1 tab(s) orally once a day (25 Aug 2020 13:41)      PAST MEDICAL & SURGICAL HISTORY:  Glaucoma  Tuberculosis  Dementia  Arthritis  History of BPH  Malignant neoplasm of unspecified part of left bronchus or lung  Hyperlipidemia  Hypertension  History of lung biopsy  Pacemaker: [employer]  model [id number]  serial [id number]  date 7/1/2014  S/P total knee replacement, left        Vital Signs Last 24 Hrs  T(C): 36.9 (25 Aug 2020 19:35), Max: 36.9 (25 Aug 2020 19:35)  T(F): 98.5 (25 Aug 2020 19:35), Max: 98.5 (25 Aug 2020 19:35)  HR: 65 (25 Aug 2020 19:35) (61 - 65)  BP: 135/71 (25 Aug 2020 12:58) (135/71 - 135/71)  BP(mean): --  RR: 20 (25 Aug 2020 19:35) (18 - 20)  SpO2: 99% (25 Aug 2020 19:35) (94% - 99%)  I&O's Detail    25 Aug 2020 07:01  -  25 Aug 2020 20:00  --------------------------------------------------------  IN:  Total IN: 0 mL    OUT:    Chest Tube: 60 mL  Total OUT: 60 mL    Total NET: -60 mL        CAPILLARY BLOOD GLUCOSE          Home Medications:  aspirin 81 mg oral tablet: 1 tab(s) orally once a day (25 Aug 2020 13:41)  atenolol 100 mg oral tablet: 1 tab(s) orally once a day (25 Aug 2020 13:41)  omeprazole 40 mg oral delayed release capsule: 1 cap(s) orally once a day (25 Aug 2020 13:41)  pravastatin 40 mg oral tablet: 1 tab(s) orally once a day (25 Aug 2020 13:41)  tamsulosin 0.4 mg oral capsule: 1 cap(s) orally once a day (25 Aug 2020 13:41)  timolol hemihydrate 0.5% ophthalmic solution: 1 drop(s) to each affected eye 2 times a day (25 Aug 2020 13:41)  Vitamin B Complex 100: 1 tab(s) orally once a day (25 Aug 2020 13:41)      MEDICATIONS  (STANDING):  atorvastatin 10 milliGRAM(s) Oral at bedtime  heparin   Injectable 5000 Unit(s) SubCutaneous every 8 hours  lactated ringers. 500 milliLiter(s) (30 mL/Hr) IV Continuous <Continuous>  metoprolol tartrate Injectable 2.5 milliGRAM(s) IV Push every 6 hours  pantoprazole    Tablet 40 milliGRAM(s) Oral before breakfast  polyethylene glycol 3350 17 Gram(s) Oral daily  senna 2 Tablet(s) Oral at bedtime  tamsulosin 0.4 milliGRAM(s) Oral at bedtime  timolol 0.5% Solution 1 Drop(s) Both EYES every 12 hours    MEDICATIONS  (PRN):          Physical exam:                             General:               Pt is awake, alert,  appears to be in pain but not in  distress                                                  Neuro:                  Nonfocal                             Cardiovascular:   S1 & S2, regular / irregular                          Respiratory:         Air entry is fair and equal on both sides, has bilateral conducted sounds                           GI:                          Soft, nondistended and nontender, Bowel sounds active                            Ext:                        No cyanosis or edema     Labs:                                                                                     Transcutaneous Bilirubin            CXR:      Plan:    General: 85yMale s/p Flexible bronchoscopy, left robotic assisted VATS, pneumolysis, left upper lobectomy, partial pleurectomy and mediastinal lymph node dissection 8/25/20 , experiencing  pain with deep breathing.                             Neuro:                                         Pain control with PCA /  Tylenol IV                            Cardiovascular:                                          Continue hemodynamic monitoring.    HTN: Lopressor 2.5mg IV Q6 hrs for tonight and restart Atenolol in AM    HLD: Restart Lipitor tomorrow                            Respiratory:                                         Pt is on 2L  nasal canula, wean off as tolerated                                          Appears to be in pain, not in any distress.                                         Encourage incentive spirometry                                          Monitor chest tube output                                         Chest tube to suction, has air leak.                                                                  Continue bronchodilators, pulmonary toilet                            GI                                         On clear liquids, advance to DASH / diabetic / regular diet as tolerated                                         Continue Zofran / Reglan for nausea - PRN	                                                                 Renal:                                         Continue LR / NS  30cc/hr                                         Monitor I/Os and electrolytes                                         D/C [place] in AM                                                 Hem/ Onc:                                                                                  Monitor chest tube output &  signs of bleeding.                                          Follow CBC in AM                           Infectious disease:                                            Monitor for fever / leukocytosis.                                          All surgical incision / chest tube  sites look clean                            Endocrine                                             DM-2 / Hyperglycemia: Continue Accu-Checks with coverage    Pt is on SQ Heparin and Venodyne boots for DVT prophylaxis.     Pertinent clinical, laboratory, radiographic, hemodynamic, echocardiographic, respiratory data, microbiologic data and chart were reviewed and analyzed frequently throughout the course of the day and night  Patient seen, examined and plan discussed with CT Surgeon   / CTICU team during rounds.    Status discussed with patient /  family at bedside / over the phone, updated plan of care    I have spent     minutes of critical care time with this pt between       and             Anibal Pruitt MD DOMENIC HAN      85y   Male   [id number]         fish (Other (Mild))  No Known Drug Allergies  shellfish (Other (Mild))             Daily Height in cm: 172.72 (25 Aug 2020 12:58)    Daily Drug Dosing Weight  Height (cm): 172.72 (25 Aug 2020 13:10)  Weight (kg): 75.3 (25 Aug 2020 13:10)  BMI (kg/m2): 25.2 (25 Aug 2020 13:10)  BSA (m2): 1.89 (25 Aug 2020 13:10)    HPI:  85  year old male presents to presurgical testing with diagnosis of malignant neoplasm of unspecified part of left bronchus or lung scheduled for left video assisted thoracoscopy robotic assisted left upper lobectomy mediastinal lymph node dissection. 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 (18 Aug 2020 14:08)      Procedure: Flexible bronchoscopy, left robotic assisted VATS, pneumolysis, left upper lobectomy, partial pleurectomy and mediastinal lymph node dissection 8/25/20    Issues:  Lung cancer  Postop pain  HTN  HLD  BPH  Hx of TB                 Home Medications:  aspirin 81 mg oral tablet: 1 tab(s) orally once a day (25 Aug 2020 13:41)  atenolol 100 mg oral tablet: 1 tab(s) orally once a day (25 Aug 2020 13:41)  omeprazole 40 mg oral delayed release capsule: 1 cap(s) orally once a day (25 Aug 2020 13:41)  pravastatin 40 mg oral tablet: 1 tab(s) orally once a day (25 Aug 2020 13:41)  tamsulosin 0.4 mg oral capsule: 1 cap(s) orally once a day (25 Aug 2020 13:41)  timolol hemihydrate 0.5% ophthalmic solution: 1 drop(s) to each affected eye 2 times a day (25 Aug 2020 13:41)  Vitamin B Complex 100: 1 tab(s) orally once a day (25 Aug 2020 13:41)      PAST MEDICAL & SURGICAL HISTORY:  Glaucoma  Tuberculosis  Dementia  Arthritis  History of BPH  Malignant neoplasm of unspecified part of left bronchus or lung  Hyperlipidemia  Hypertension  History of lung biopsy  Pacemaker: [employer]  model [id number]  serial [id number]  date 7/1/2014  S/P total knee replacement, left        Vital Signs Last 24 Hrs  T(C): 36.9 (25 Aug 2020 19:35), Max: 36.9 (25 Aug 2020 19:35)  T(F): 98.5 (25 Aug 2020 19:35), Max: 98.5 (25 Aug 2020 19:35)  HR: 65 (25 Aug 2020 19:35) (61 - 65)  BP: 135/71 (25 Aug 2020 12:58) (135/71 - 135/71)  BP(mean): --  RR: 20 (25 Aug 2020 19:35) (18 - 20)  SpO2: 99% (25 Aug 2020 19:35) (94% - 99%)  I&O's Detail    25 Aug 2020 07:01  -  25 Aug 2020 20:00  --------------------------------------------------------  IN:  Total IN: 0 mL    OUT:    Chest Tube: 60 mL  Total OUT: 60 mL    Total NET: -60 mL        CAPILLARY BLOOD GLUCOSE          Home Medications:  aspirin 81 mg oral tablet: 1 tab(s) orally once a day (25 Aug 2020 13:41)  atenolol 100 mg oral tablet: 1 tab(s) orally once a day (25 Aug 2020 13:41)  omeprazole 40 mg oral delayed release capsule: 1 cap(s) orally once a day (25 Aug 2020 13:41)  pravastatin 40 mg oral tablet: 1 tab(s) orally once a day (25 Aug 2020 13:41)  tamsulosin 0.4 mg oral capsule: 1 cap(s) orally once a day (25 Aug 2020 13:41)  timolol hemihydrate 0.5% ophthalmic solution: 1 drop(s) to each affected eye 2 times a day (25 Aug 2020 13:41)  Vitamin B Complex 100: 1 tab(s) orally once a day (25 Aug 2020 13:41)      MEDICATIONS  (STANDING):  atorvastatin 10 milliGRAM(s) Oral at bedtime  heparin   Injectable 5000 Unit(s) SubCutaneous every 8 hours  lactated ringers. 500 milliLiter(s) (30 mL/Hr) IV Continuous <Continuous>  metoprolol tartrate Injectable 2.5 milliGRAM(s) IV Push every 6 hours  pantoprazole    Tablet 40 milliGRAM(s) Oral before breakfast  polyethylene glycol 3350 17 Gram(s) Oral daily  senna 2 Tablet(s) Oral at bedtime  tamsulosin 0.4 milliGRAM(s) Oral at bedtime  timolol 0.5% Solution 1 Drop(s) Both EYES every 12 hours    MEDICATIONS  (PRN):      Physical exam:                             General:               Pt appears to be in  pain but not in  distress                                                  Neuro:                  Nonfocal                             Cardiovascular:   S1 & S2, regular                           Respiratory:         Air entry is fair and equal on both sides, has bilateral conducted sounds                           GI:                          Soft, nondistended and nontender, Bowel sounds active                            Ext:                        No cyanosis or edema     Labs:                                                                   CXR:  Left lung opacity, atelectasis vs hematoma, has two left chest tubes    Plan:    General: 85yMale s/p Flexible bronchoscopy, left robotic assisted VATS, pneumolysis, left upper lobectomy, partial pleurectomy and mediastinal lymph node dissection 8/25/20 , experiencing  pain with deep breathing.                             Neuro:                                         Pain control with PCA /  Tylenol IV / Lidoderm                            Cardiovascular:                                          Continue hemodynamic monitoring.    HTN: Lopressor 2.5mg IV Q6 hrs for tonight and restart Atenolol in AM    HLD: Restart Lipitor tomorrow                            Respiratory:                                         Pt is on 2L  nasal canula, wean off as tolerated                                          Appears to be in pain, not in any distress.                                         Encourage incentive spirometry                                          Monitor chest tube output                                         Chest tube to suction, has air leak.                                                                  Continue bronchodilators, pulmonary toilet     Hx of TB: Keep pt in airborne isolation                            GI                                         NPO except meds / ice chips                                         Continue Zofran / Reglan for nausea - PRN	                                                                 Renal:                                         Continue LR   30cc/hr                                         Monitor I/Os and electrolytes                                         BPH: On Flomax                                                 Hem/ Onc:                                                                                  Monitor chest tube output &  signs of bleeding.                                          CBC now &  in AM                           Infectious disease:                                            Monitor for fever / leukocytosis.                                          All surgical incision / chest tube  sites look clean                            Endocrine                                             Continue Accu-Checks with coverage    Pt is on SQ Heparin and Venodyne boots for DVT prophylaxis.     Pertinent clinical, laboratory, radiographic, hemodynamic, echocardiographic, respiratory data, microbiologic data and chart were reviewed and analyzed frequently throughout the course of the day and night  Patient seen, examined and plan discussed with CT Surgeon Dr. Streeter  / CTICU team.            Anibal Pruitt MD

## 2020-08-25 NOTE — PROCEDURE NOTE - INTERROGATION NOTE: COMMENTS
Preop Left VATS- Switched to DOOR Mode with Base Rate 65 bpm Preop Left VATS- Switched to DOOR Mode with Base Rate 65 bpm-reprogrammed back to DDDR at 60bpm

## 2020-08-25 NOTE — BRIEF OPERATIVE NOTE - COMMENTS
EMA Mcarthur provided direct first assist support to the surgeon during this surgical procedure. My involvement included positioning, prepping and draping the patient prior to surgery,  robotic port placement, Robotic docking, robotic instrument insertion and removal, ensuring clear visibility and exposure for the surgeon by using instruments such as retractors, suction and sponges, retrieving specimens from the operative field, closing surgical incisions, undocking the robot, stapling tissues and vessels, and dressing wounds. As well as other tasks as directed by the surgeon.

## 2020-08-25 NOTE — ASU PREOP CHECKLIST - 1.
[facility]  #[id number] [facility]  #[id number] Regina /// Jaylention at bs per EP in pre op (Dr Moore at [facility])

## 2020-08-26 LAB
ANION GAP SERPL CALC-SCNC: 18 MMO/L — HIGH (ref 7–14)
BASOPHILS # BLD AUTO: 0.01 K/UL — SIGNIFICANT CHANGE UP (ref 0–0.2)
BASOPHILS NFR BLD AUTO: 0.1 % — SIGNIFICANT CHANGE UP (ref 0–2)
BUN SERPL-MCNC: 20 MG/DL — SIGNIFICANT CHANGE UP (ref 7–23)
CALCIUM SERPL-MCNC: 8.3 MG/DL — LOW (ref 8.4–10.5)
CHLORIDE SERPL-SCNC: 103 MMOL/L — SIGNIFICANT CHANGE UP (ref 98–107)
CO2 SERPL-SCNC: 16 MMOL/L — LOW (ref 22–31)
CREAT SERPL-MCNC: 0.87 MG/DL — SIGNIFICANT CHANGE UP (ref 0.5–1.3)
EOSINOPHIL # BLD AUTO: 0 K/UL — SIGNIFICANT CHANGE UP (ref 0–0.5)
EOSINOPHIL NFR BLD AUTO: 0 % — SIGNIFICANT CHANGE UP (ref 0–6)
GLUCOSE SERPL-MCNC: 208 MG/DL — HIGH (ref 70–99)
HCT VFR BLD CALC: 41.4 % — SIGNIFICANT CHANGE UP (ref 39–50)
HGB BLD-MCNC: 13.5 G/DL — SIGNIFICANT CHANGE UP (ref 13–17)
IMM GRANULOCYTES NFR BLD AUTO: 0.6 % — SIGNIFICANT CHANGE UP (ref 0–1.5)
LYMPHOCYTES # BLD AUTO: 0.31 K/UL — LOW (ref 1–3.3)
LYMPHOCYTES # BLD AUTO: 2.8 % — LOW (ref 13–44)
MCHC RBC-ENTMCNC: 30.9 PG — SIGNIFICANT CHANGE UP (ref 27–34)
MCHC RBC-ENTMCNC: 32.6 % — SIGNIFICANT CHANGE UP (ref 32–36)
MCV RBC AUTO: 94.7 FL — SIGNIFICANT CHANGE UP (ref 80–100)
MONOCYTES # BLD AUTO: 0.74 K/UL — SIGNIFICANT CHANGE UP (ref 0–0.9)
MONOCYTES NFR BLD AUTO: 6.7 % — SIGNIFICANT CHANGE UP (ref 2–14)
NEUTROPHILS # BLD AUTO: 9.87 K/UL — HIGH (ref 1.8–7.4)
NEUTROPHILS NFR BLD AUTO: 89.8 % — HIGH (ref 43–77)
NRBC # FLD: 0 K/UL — SIGNIFICANT CHANGE UP (ref 0–0)
PLATELET # BLD AUTO: 141 K/UL — LOW (ref 150–400)
PMV BLD: 9.9 FL — SIGNIFICANT CHANGE UP (ref 7–13)
POTASSIUM SERPL-MCNC: 4.5 MMOL/L — SIGNIFICANT CHANGE UP (ref 3.5–5.3)
POTASSIUM SERPL-SCNC: 4.5 MMOL/L — SIGNIFICANT CHANGE UP (ref 3.5–5.3)
RBC # BLD: 4.37 M/UL — SIGNIFICANT CHANGE UP (ref 4.2–5.8)
RBC # FLD: 12 % — SIGNIFICANT CHANGE UP (ref 10.3–14.5)
SODIUM SERPL-SCNC: 137 MMOL/L — SIGNIFICANT CHANGE UP (ref 135–145)
WBC # BLD: 11 K/UL — HIGH (ref 3.8–10.5)
WBC # FLD AUTO: 11 K/UL — HIGH (ref 3.8–10.5)

## 2020-08-26 PROCEDURE — 99233 SBSQ HOSP IP/OBS HIGH 50: CPT

## 2020-08-26 PROCEDURE — 71045 X-RAY EXAM CHEST 1 VIEW: CPT | Mod: 26,76

## 2020-08-26 PROCEDURE — 31645 BRNCHSC W/THER ASPIR 1ST: CPT | Mod: 78

## 2020-08-26 PROCEDURE — 31624 DX BRONCHOSCOPE/LAVAGE: CPT | Mod: 78

## 2020-08-26 RX ORDER — ACETAMINOPHEN 500 MG
1000 TABLET ORAL ONCE
Refills: 0 | Status: COMPLETED | OUTPATIENT
Start: 2020-08-26 | End: 2021-07-25

## 2020-08-26 RX ORDER — SODIUM CHLORIDE 9 MG/ML
4 INJECTION INTRAMUSCULAR; INTRAVENOUS; SUBCUTANEOUS EVERY 6 HOURS
Refills: 0 | Status: COMPLETED | OUTPATIENT
Start: 2020-08-26 | End: 2020-08-29

## 2020-08-26 RX ORDER — METOCLOPRAMIDE HCL 10 MG
10 TABLET ORAL ONCE
Refills: 0 | Status: COMPLETED | OUTPATIENT
Start: 2020-08-26 | End: 2020-08-26

## 2020-08-26 RX ORDER — ACETAMINOPHEN 500 MG
1000 TABLET ORAL ONCE
Refills: 0 | Status: DISCONTINUED | OUTPATIENT
Start: 2020-08-26 | End: 2020-09-06

## 2020-08-26 RX ORDER — ACETAMINOPHEN 500 MG
1000 TABLET ORAL ONCE
Refills: 0 | Status: COMPLETED | OUTPATIENT
Start: 2020-08-26 | End: 2020-08-26

## 2020-08-26 RX ORDER — IPRATROPIUM/ALBUTEROL SULFATE 18-103MCG
3 AEROSOL WITH ADAPTER (GRAM) INHALATION EVERY 6 HOURS
Refills: 0 | Status: DISCONTINUED | OUTPATIENT
Start: 2020-08-26 | End: 2020-08-29

## 2020-08-26 RX ADMIN — SODIUM CHLORIDE 4 MILLILITER(S): 9 INJECTION INTRAMUSCULAR; INTRAVENOUS; SUBCUTANEOUS at 09:26

## 2020-08-26 RX ADMIN — LIDOCAINE 1 PATCH: 4 CREAM TOPICAL at 14:11

## 2020-08-26 RX ADMIN — LIDOCAINE 1 PATCH: 4 CREAM TOPICAL at 02:00

## 2020-08-26 RX ADMIN — Medication 2.5 MILLIGRAM(S): at 02:03

## 2020-08-26 RX ADMIN — Medication 1 DROP(S): at 17:08

## 2020-08-26 RX ADMIN — ATORVASTATIN CALCIUM 10 MILLIGRAM(S): 80 TABLET, FILM COATED ORAL at 21:07

## 2020-08-26 RX ADMIN — LIDOCAINE 1 PATCH: 4 CREAM TOPICAL at 07:37

## 2020-08-26 RX ADMIN — HYDROMORPHONE HYDROCHLORIDE 30 MILLILITER(S): 2 INJECTION INTRAMUSCULAR; INTRAVENOUS; SUBCUTANEOUS at 07:25

## 2020-08-26 RX ADMIN — SENNA PLUS 2 TABLET(S): 8.6 TABLET ORAL at 21:07

## 2020-08-26 RX ADMIN — Medication 10 MILLIGRAM(S): at 05:53

## 2020-08-26 RX ADMIN — SODIUM CHLORIDE 4 MILLILITER(S): 9 INJECTION INTRAMUSCULAR; INTRAVENOUS; SUBCUTANEOUS at 16:16

## 2020-08-26 RX ADMIN — TAMSULOSIN HYDROCHLORIDE 0.4 MILLIGRAM(S): 0.4 CAPSULE ORAL at 21:07

## 2020-08-26 RX ADMIN — Medication 1000 MILLIGRAM(S): at 06:15

## 2020-08-26 RX ADMIN — SODIUM CHLORIDE 30 MILLILITER(S): 9 INJECTION, SOLUTION INTRAVENOUS at 07:27

## 2020-08-26 RX ADMIN — POLYETHYLENE GLYCOL 3350 17 GRAM(S): 17 POWDER, FOR SOLUTION ORAL at 11:49

## 2020-08-26 RX ADMIN — Medication 400 MILLIGRAM(S): at 11:49

## 2020-08-26 RX ADMIN — Medication 1000 MILLIGRAM(S): at 12:23

## 2020-08-26 RX ADMIN — ONDANSETRON 4 MILLIGRAM(S): 8 TABLET, FILM COATED ORAL at 03:53

## 2020-08-26 RX ADMIN — HEPARIN SODIUM 5000 UNIT(S): 5000 INJECTION INTRAVENOUS; SUBCUTANEOUS at 14:11

## 2020-08-26 RX ADMIN — SODIUM CHLORIDE 4 MILLILITER(S): 9 INJECTION INTRAMUSCULAR; INTRAVENOUS; SUBCUTANEOUS at 22:25

## 2020-08-26 RX ADMIN — HEPARIN SODIUM 5000 UNIT(S): 5000 INJECTION INTRAVENOUS; SUBCUTANEOUS at 21:07

## 2020-08-26 RX ADMIN — Medication 2.5 MILLIGRAM(S): at 07:29

## 2020-08-26 RX ADMIN — Medication 400 MILLIGRAM(S): at 05:54

## 2020-08-26 RX ADMIN — HEPARIN SODIUM 5000 UNIT(S): 5000 INJECTION INTRAVENOUS; SUBCUTANEOUS at 05:53

## 2020-08-26 RX ADMIN — Medication 1 DROP(S): at 05:53

## 2020-08-26 RX ADMIN — Medication 3 MILLILITER(S): at 09:26

## 2020-08-26 NOTE — PHYSICAL THERAPY INITIAL EVALUATION ADULT - ADDITIONAL COMMENTS
Patient reports he lives with his children in a private house. Patient reports he has a home health aide 7 days/week, 6-8 hours/day. Patient ambulated with a cane.    Patient was left sitting in chair as found, all lines/tubes intact and call golden within reach, ALVAREZ victoria

## 2020-08-26 NOTE — PHYSICAL THERAPY INITIAL EVALUATION ADULT - PERTINENT HX OF CURRENT PROBLEM, REHAB EVAL
Patient is an 85 year old male admitted to [facility] on 8/25 with diagnosis of malignant neoplasm of unspecified part of left bronchus or lung scheduled for left video assisted thoracoscopy robotic assisted left upper lobectomy mediastinal lymph node dissection. 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

## 2020-08-26 NOTE — PROGRESS NOTE ADULT - SUBJECTIVE AND OBJECTIVE BOX
Anesthesia Pain Management Service    SUBJECTIVE: Pt doing well with IV PCA without problems reported.    Therapy:	  [ X] IV PCA	   [ ] Epidural           [ ] s/p Spinal Opoid              [ ] Postpartum infusion	  [ ] Patient controlled regional anesthesia (PCRA)    [ ] prn Analgesics    Allergies    fish (Other (Mild))  No Known Drug Allergies  shellfish (Other (Mild))    Intolerances      MEDICATIONS  (STANDING):  acetaminophen  IVPB .. 1000 milliGRAM(s) IV Intermittent once  acetaminophen  IVPB .. 1000 milliGRAM(s) IV Intermittent once  acetaminophen  IVPB .. 1000 milliGRAM(s) IV Intermittent once  acetaminophen  IVPB .. 1000 milliGRAM(s) IV Intermittent once  atorvastatin 10 milliGRAM(s) Oral at bedtime  heparin   Injectable 5000 Unit(s) SubCutaneous every 8 hours  lactated ringers. 500 milliLiter(s) (30 mL/Hr) IV Continuous <Continuous>  pantoprazole    Tablet 40 milliGRAM(s) Oral before breakfast  polyethylene glycol 3350 17 Gram(s) Oral daily  senna 2 Tablet(s) Oral at bedtime  sodium chloride 3%  Inhalation 4 milliLiter(s) Inhalation every 6 hours  tamsulosin 0.4 milliGRAM(s) Oral at bedtime  timolol 0.5% Solution 1 Drop(s) Both EYES every 12 hours    MEDICATIONS  (PRN):  albuterol/ipratropium for Nebulization 3 milliLiter(s) Nebulizer every 6 hours PRN Shortness of Breath and/or Wheezing  diphenhydrAMINE   Injectable 25 milliGRAM(s) IV Push every 4 hours PRN Pruritus  naloxone Injectable 0.1 milliGRAM(s) IV Push every 3 minutes PRN For ANY of the following changes in patient status:  A. RR LESS THAN 10 breaths per minute, B. Oxygen saturation LESS THAN 90%, C. Sedation score of 6  ondansetron Injectable 4 milliGRAM(s) IV Push every 6 hours PRN Nausea      OBJECTIVE:   [X] No new signs     [ ] Other:    Side Effects:  [X ] None			[ ] Other:    Assessment of Catheter Site:		[ ] Intact		[ ] Other:    ASSESSMENT/PLAN  [ X] Continue current therapy. Recommend non-opioid adjuvant analgesics to be used when possible and when allowed by primary surgical team.    [ ] Therapy changed to:    [ ] IV PCA       [ ] Epidural     [ ] prn Analgesics     Comments:    Progress Note written now but Patient was seen earlier.

## 2020-08-26 NOTE — PHYSICAL THERAPY INITIAL EVALUATION ADULT - MANUAL MUSCLE TESTING RESULTS, REHAB EVAL
grossly assessed due to/bilateral UEs & LEs grossly at least 3+/5, not formally tested secondary to recent surgery

## 2020-08-26 NOTE — PHYSICAL THERAPY INITIAL EVALUATION ADULT - DID THE PATIENT HAVE SURGERY?
Flexible bronchoscopy, left robotic assisted VATS, pneumolysis, left upper lobectomy, partial pleurectomy and mediastinal lymph node dissection/yes

## 2020-08-26 NOTE — PROGRESS NOTE ADULT - SUBJECTIVE AND OBJECTIVE BOX
ANESTHESIA POSTOP CHECK    85y Male POSTOP DAY 1 S/P L VATS    Vital Signs Last 24 Hrs  T(C): 36 (26 Aug 2020 08:00), Max: 36.9 (25 Aug 2020 19:35)  T(F): 96.8 (26 Aug 2020 08:00), Max: 98.5 (25 Aug 2020 19:35)  HR: 65 (26 Aug 2020 09:31) (61 - 66)  BP: 135/71 (25 Aug 2020 12:58) (135/71 - 135/71)  BP(mean): --  RR: 18 (26 Aug 2020 09:00) (11 - 22)  SpO2: 98% (26 Aug 2020 09:31) (94% - 100%)        [x ] NO APPARENT ANESTHESIA COMPLICATIONS      Comments: 5

## 2020-08-26 NOTE — PHYSICAL THERAPY INITIAL EVALUATION ADULT - PATIENT PROFILE REVIEW, REHAB EVAL
PT orders received: ambulate as tolerated. Consult with ALVAREZ SOLOMON, pt may participate in PT evaluation./yes

## 2020-08-26 NOTE — PROGRESS NOTE ADULT - SUBJECTIVE AND OBJECTIVE BOX
PROCEDURE: left robotic assisted VATS, pneumolysis, left upper lobectomy, partial pleurectomy and mediastinal lymph node dissection  25-Aug-2020       ISSUES:   Lung cancer  Post op pain  Chest tube in place  BPH  HLD  HTN  Dementia  Hx of tuberculosis (unclear history)  Glaucoma  s/p Pacemaker [employer], model [id number], serial [id number], date 7/1/2014    INTERVAL EVENTS: L lung atelectasis. Bronchoscopy today. Improved CXR after bronchoscopy.         HISTORY:   Patient reports moderate pain at chest wall incision sites which is worse with coughing and deep breathing without associated fever or dyspnea. Pain is improved with use of pain meds.     PHYSICAL EXAM:   Gen: Comfortable, No acute distress  Eyes: Sclera white, Conjunctiva normal, Eyelids normal, Pupils symmetrical   ENT: Mucous membranes moist,  ,  ,    Neck: Trachea midline,  ,  ,  ,  ,    CV: Rate regular, Rhythm regular,  ,  ,    Resp: Breath sounds clear, No accessory muscles use, Two chest tubes,    Abd: Soft, Non-distended, Non-tender, Bowel sounds normal,  ,    Skin: Warm, No peripheral edema of lower extremities,  ,    : No merchant  Neuro: Moving all 4 extremities,    Psych: A&Ox3      ASSESSMENT AND PLAN:     NEURO:  Post-operative Pain - Pain control with PCA and Tylenol IV PRN.       RESPIRATORY:  Hypoxia - Wean nasal cannula for goal O2sat above 92. Obtain CXR. Incentive spirometry. Chest PT and frequent suctioning. Continue bronchodilators. OOB to chair & ambulate w/ assistance. Continuous pulse oximetry for support & to prevent decompensation.       Chest tube – Pleurevac regulated suctioning. Monitor chest tube output.          CARDIOVASCULAR:  Hemodynamically stable - Not on pressors. Continue hemodynamic monitoring.  HTN - stable. Hold home antihypertensives for now.     RENAL:  Stable – LR IVF 30mL/hr. Monitor IOs and electrolytes.   BPH - Stable. Flomax qday      GASTROINTESTINAL:  GI prophylaxis with pantoprazole  Zofran and Reglan IV PRN for nausea  Regular consistency diet       HEMATOLOGIC:  No signs of active bleeding. Monitor Hgb in CBC in AM  DVT prophylaxis with heparin subQ and SCDs.         INFECTIOUS DISEASE:  All surgical sites appear clean. Will monitor for fever and leukocytosis.         ENDOCRINE:  Stable – Monitor glucose fingersticks for goal 120-180.            Pertinent clinical, laboratory, radiographic, hemodynamic, echocardiographic, respiratory data, microbiologic data and chart were reviewed by myself and analyzed frequently throughout the course of the day and night by myself.    Plan discussed at length with the CTICU staff and Attending CT Surgeon.     Patient's status was discussed with patient at bedside.           ________________________________________________    _________________________  VITAL SIGNS:  Vital Signs Last 24 Hrs  T(C): 36.9 (26 Aug 2020 20:00), Max: 36.9 (26 Aug 2020 20:00)  T(F): 98.5 (26 Aug 2020 20:00), Max: 98.5 (26 Aug 2020 20:00)  HR: 65 (26 Aug 2020 22:00) (65 - 66)  BP: --  BP(mean): --  RR: 20 (26 Aug 2020 22:00) (11 - 22)  SpO2: 96% (26 Aug 2020 22:00) (94% - 100%)  I/Os:   I&O's Detail    25 Aug 2020 07:01  -  26 Aug 2020 07:00  --------------------------------------------------------  IN:    IV PiggyBack: 200 mL    lactated ringers.: 360 mL  Total IN: 560 mL    OUT:    Chest Tube: 390 mL    Chest Tube: 26 mL    Voided: 500 mL  Total OUT: 916 mL    Total NET: -356 mL      26 Aug 2020 07:01  -  26 Aug 2020 22:48  --------------------------------------------------------  IN:    IV PiggyBack: 100 mL    lactated ringers.: 450 mL    Oral Fluid: 70 mL  Total IN: 620 mL    OUT:    Chest Tube: 395 mL    Chest Tube: 39 mL    Voided: 430 mL  Total OUT: 864 mL    Total NET: -244 mL              MEDICATIONS:  MEDICATIONS  (STANDING):  acetaminophen  IVPB .. 1000 milliGRAM(s) IV Intermittent once  acetaminophen  IVPB .. 1000 milliGRAM(s) IV Intermittent once  acetaminophen  IVPB .. 1000 milliGRAM(s) IV Intermittent once  atorvastatin 10 milliGRAM(s) Oral at bedtime  heparin   Injectable 5000 Unit(s) SubCutaneous every 8 hours  lactated ringers. 500 milliLiter(s) (30 mL/Hr) IV Continuous <Continuous>  pantoprazole    Tablet 40 milliGRAM(s) Oral before breakfast  polyethylene glycol 3350 17 Gram(s) Oral daily  senna 2 Tablet(s) Oral at bedtime  sodium chloride 3%  Inhalation 4 milliLiter(s) Inhalation every 6 hours  tamsulosin 0.4 milliGRAM(s) Oral at bedtime  timolol 0.5% Solution 1 Drop(s) Both EYES every 12 hours    MEDICATIONS  (PRN):  albuterol/ipratropium for Nebulization 3 milliLiter(s) Nebulizer every 6 hours PRN Shortness of Breath and/or Wheezing  naloxone Injectable 0.1 milliGRAM(s) IV Push every 3 minutes PRN For ANY of the following changes in patient status:  A. RR LESS THAN 10 breaths per minute, B. Oxygen saturation LESS THAN 90%, C. Sedation score of 6  ondansetron Injectable 4 milliGRAM(s) IV Push every 6 hours PRN Nausea      LABS:                        13.5   11.00 )-----------( 141      ( 26 Aug 2020 06:38 )             41.4     08-26    137  |  103  |  20  ----------------------------<  208<H>  4.5   |  16<L>  |  0.87    Ca    8.3<L>      26 Aug 2020 06:38        PT/INR - ( 25 Aug 2020 20:10 )   PT: 12.1 SEC;   INR: 1.05          PTT - ( 25 Aug 2020 20:10 )  PTT:25.6 SEC      _________________________

## 2020-08-27 LAB
ANION GAP SERPL CALC-SCNC: 11 MMO/L — SIGNIFICANT CHANGE UP (ref 7–14)
BUN SERPL-MCNC: 20 MG/DL — SIGNIFICANT CHANGE UP (ref 7–23)
CALCIUM SERPL-MCNC: 8.3 MG/DL — LOW (ref 8.4–10.5)
CHLORIDE SERPL-SCNC: 101 MMOL/L — SIGNIFICANT CHANGE UP (ref 98–107)
CO2 SERPL-SCNC: 20 MMOL/L — LOW (ref 22–31)
CREAT SERPL-MCNC: 0.76 MG/DL — SIGNIFICANT CHANGE UP (ref 0.5–1.3)
GLUCOSE SERPL-MCNC: 136 MG/DL — HIGH (ref 70–99)
HCT VFR BLD CALC: 36.5 % — LOW (ref 39–50)
HGB BLD-MCNC: 12.6 G/DL — LOW (ref 13–17)
MAGNESIUM SERPL-MCNC: 2.2 MG/DL — SIGNIFICANT CHANGE UP (ref 1.6–2.6)
MCHC RBC-ENTMCNC: 32.4 PG — SIGNIFICANT CHANGE UP (ref 27–34)
MCHC RBC-ENTMCNC: 34.5 % — SIGNIFICANT CHANGE UP (ref 32–36)
MCV RBC AUTO: 93.8 FL — SIGNIFICANT CHANGE UP (ref 80–100)
NRBC # FLD: 0 K/UL — SIGNIFICANT CHANGE UP (ref 0–0)
PLATELET # BLD AUTO: 142 K/UL — LOW (ref 150–400)
PMV BLD: 10.3 FL — SIGNIFICANT CHANGE UP (ref 7–13)
POTASSIUM SERPL-MCNC: 3.9 MMOL/L — SIGNIFICANT CHANGE UP (ref 3.5–5.3)
POTASSIUM SERPL-SCNC: 3.9 MMOL/L — SIGNIFICANT CHANGE UP (ref 3.5–5.3)
RBC # BLD: 3.89 M/UL — LOW (ref 4.2–5.8)
RBC # FLD: 12.5 % — SIGNIFICANT CHANGE UP (ref 10.3–14.5)
SODIUM SERPL-SCNC: 132 MMOL/L — LOW (ref 135–145)
WBC # BLD: 12.6 K/UL — HIGH (ref 3.8–10.5)
WBC # FLD AUTO: 12.6 K/UL — HIGH (ref 3.8–10.5)

## 2020-08-27 PROCEDURE — 71045 X-RAY EXAM CHEST 1 VIEW: CPT | Mod: 26,76

## 2020-08-27 PROCEDURE — 99233 SBSQ HOSP IP/OBS HIGH 50: CPT

## 2020-08-27 RX ORDER — OXYCODONE HYDROCHLORIDE 5 MG/1
5 TABLET ORAL EVERY 6 HOURS
Refills: 0 | Status: DISCONTINUED | OUTPATIENT
Start: 2020-08-27 | End: 2020-08-31

## 2020-08-27 RX ORDER — ACETAMINOPHEN 500 MG
1000 TABLET ORAL ONCE
Refills: 0 | Status: COMPLETED | OUTPATIENT
Start: 2020-08-27 | End: 2020-08-27

## 2020-08-27 RX ADMIN — Medication 400 MILLIGRAM(S): at 09:20

## 2020-08-27 RX ADMIN — SODIUM CHLORIDE 4 MILLILITER(S): 9 INJECTION INTRAMUSCULAR; INTRAVENOUS; SUBCUTANEOUS at 14:49

## 2020-08-27 RX ADMIN — HEPARIN SODIUM 5000 UNIT(S): 5000 INJECTION INTRAVENOUS; SUBCUTANEOUS at 13:40

## 2020-08-27 RX ADMIN — OXYCODONE HYDROCHLORIDE 5 MILLIGRAM(S): 5 TABLET ORAL at 22:46

## 2020-08-27 RX ADMIN — SODIUM CHLORIDE 30 MILLILITER(S): 9 INJECTION, SOLUTION INTRAVENOUS at 13:40

## 2020-08-27 RX ADMIN — Medication 1 DROP(S): at 19:00

## 2020-08-27 RX ADMIN — TAMSULOSIN HYDROCHLORIDE 0.4 MILLIGRAM(S): 0.4 CAPSULE ORAL at 21:23

## 2020-08-27 RX ADMIN — HEPARIN SODIUM 5000 UNIT(S): 5000 INJECTION INTRAVENOUS; SUBCUTANEOUS at 21:23

## 2020-08-27 RX ADMIN — ATORVASTATIN CALCIUM 10 MILLIGRAM(S): 80 TABLET, FILM COATED ORAL at 21:23

## 2020-08-27 RX ADMIN — OXYCODONE HYDROCHLORIDE 5 MILLIGRAM(S): 5 TABLET ORAL at 14:10

## 2020-08-27 RX ADMIN — OXYCODONE HYDROCHLORIDE 5 MILLIGRAM(S): 5 TABLET ORAL at 21:46

## 2020-08-27 RX ADMIN — POLYETHYLENE GLYCOL 3350 17 GRAM(S): 17 POWDER, FOR SOLUTION ORAL at 12:19

## 2020-08-27 RX ADMIN — SODIUM CHLORIDE 4 MILLILITER(S): 9 INJECTION INTRAMUSCULAR; INTRAVENOUS; SUBCUTANEOUS at 22:15

## 2020-08-27 RX ADMIN — HEPARIN SODIUM 5000 UNIT(S): 5000 INJECTION INTRAVENOUS; SUBCUTANEOUS at 05:55

## 2020-08-27 RX ADMIN — Medication 1 DROP(S): at 05:55

## 2020-08-27 RX ADMIN — Medication 1000 MILLIGRAM(S): at 09:50

## 2020-08-27 RX ADMIN — SODIUM CHLORIDE 4 MILLILITER(S): 9 INJECTION INTRAMUSCULAR; INTRAVENOUS; SUBCUTANEOUS at 09:33

## 2020-08-27 RX ADMIN — SODIUM CHLORIDE 4 MILLILITER(S): 9 INJECTION INTRAMUSCULAR; INTRAVENOUS; SUBCUTANEOUS at 03:55

## 2020-08-27 RX ADMIN — PANTOPRAZOLE SODIUM 40 MILLIGRAM(S): 20 TABLET, DELAYED RELEASE ORAL at 05:55

## 2020-08-27 RX ADMIN — SENNA PLUS 2 TABLET(S): 8.6 TABLET ORAL at 21:23

## 2020-08-27 RX ADMIN — OXYCODONE HYDROCHLORIDE 5 MILLIGRAM(S): 5 TABLET ORAL at 13:40

## 2020-08-27 RX ADMIN — SODIUM CHLORIDE 30 MILLILITER(S): 9 INJECTION, SOLUTION INTRAVENOUS at 21:22

## 2020-08-27 NOTE — PROGRESS NOTE ADULT - SUBJECTIVE AND OBJECTIVE BOX
DOMENIC HAN      85y   Male   [id number]         fish (Other (Mild))  No Known Drug Allergies  shellfish (Other (Mild))             Daily     Daily Weight in k.1 (27 Aug 2020 06:00)Drug Dosing Weight  Height (cm): 172.72 (25 Aug 2020 13:10)  Weight (kg): 75.3 (25 Aug 2020 13:10)  BMI (kg/m2): 25.2 (25 Aug 2020 13:10)  BSA (m2): 1.89 (25 Aug 2020 13:10)      85  year old male presents to presurgical testing with diagnosis of malignant neoplasm of unspecified part of left bronchus or lung scheduled for left video assisted thoracoscopy robotic assisted left upper lobectomy mediastinal lymph node dissection. 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 (18 Aug 2020 14:08)      Procedure: Flexible bronchoscopy, left robotic assisted VATS, pneumolysis, left upper lobectomy, partial pleurectomy and mediastinal lymph node dissection 20    Issues:  Lung cancer  Left lung atelectasis  Postop pain  HTN  HLD  BPH  Hx of TB               Home Medications:  aspirin 81 mg oral tablet: 1 tab(s) orally once a day (25 Aug 2020 13:41)  atenolol 100 mg oral tablet: 1 tab(s) orally once a day (25 Aug 2020 13:41)  omeprazole 40 mg oral delayed release capsule: 1 cap(s) orally once a day (25 Aug 2020 13:41)  pravastatin 40 mg oral tablet: 1 tab(s) orally once a day (25 Aug 2020 13:41)  tamsulosin 0.4 mg oral capsule: 1 cap(s) orally once a day (25 Aug 2020 13:41)  timolol hemihydrate 0.5% ophthalmic solution: 1 drop(s) to each affected eye 2 times a day (25 Aug 2020 13:41)  Vitamin B Complex 100: 1 tab(s) orally once a day (25 Aug 2020 13:41)      PAST MEDICAL & SURGICAL HISTORY:  Glaucoma  Tuberculosis  Dementia  Arthritis  History of BPH  Malignant neoplasm of unspecified part of left bronchus or lung  Hyperlipidemia  Hypertension  History of lung biopsy  Pacemaker: [employer]  model [id number]  serial [id number]  date 2014  S/P total knee replacement, left        Vital Signs Last 24 Hrs  T(C): 36.7 (27 Aug 2020 08:00), Max: 36.9 (26 Aug 2020 20:00)  T(F): 98 (27 Aug 2020 08:00), Max: 98.5 (26 Aug 2020 20:00)  HR: 65 (27 Aug 2020 08:00) (65 - 67)  BP: --  BP(mean): --  RR: 22 (27 Aug 2020 08:00) (11 - 23)  SpO2: 96% (27 Aug 2020 08:00) (94% - 100%)  I&O's Detail    26 Aug 2020 07:01  -  27 Aug 2020 07:00  --------------------------------------------------------  IN:    IV PiggyBack: 100 mL    lactated ringers.: 720 mL    Oral Fluid: 170 mL  Total IN: 990 mL    OUT:    Chest Tube: 94 mL    Chest Tube: 555 mL    Voided: 730 mL  Total OUT: 1379 mL    Total NET: -389 mL      27 Aug 2020 07:01  -  27 Aug 2020 08:52  --------------------------------------------------------  IN:    lactated ringers.: 30 mL  Total IN: 30 mL    OUT:    Chest Tube: 10 mL    Voided: 100 mL  Total OUT: 110 mL    Total NET: -80 mL        CAPILLARY BLOOD GLUCOSE          Home Medications:  aspirin 81 mg oral tablet: 1 tab(s) orally once a day (25 Aug 2020 13:41)  atenolol 100 mg oral tablet: 1 tab(s) orally once a day (25 Aug 2020 13:41)  omeprazole 40 mg oral delayed release capsule: 1 cap(s) orally once a day (25 Aug 2020 13:41)  pravastatin 40 mg oral tablet: 1 tab(s) orally once a day (25 Aug 2020 13:41)  tamsulosin 0.4 mg oral capsule: 1 cap(s) orally once a day (25 Aug 2020 13:41)  timolol hemihydrate 0.5% ophthalmic solution: 1 drop(s) to each affected eye 2 times a day (25 Aug 2020 13:41)  Vitamin B Complex 100: 1 tab(s) orally once a day (25 Aug 2020 13:41)      MEDICATIONS  (STANDING):  acetaminophen  IVPB .. 1000 milliGRAM(s) IV Intermittent once  acetaminophen  IVPB .. 1000 milliGRAM(s) IV Intermittent once  acetaminophen  IVPB .. 1000 milliGRAM(s) IV Intermittent once  atorvastatin 10 milliGRAM(s) Oral at bedtime  heparin   Injectable 5000 Unit(s) SubCutaneous every 8 hours  lactated ringers. 500 milliLiter(s) (30 mL/Hr) IV Continuous <Continuous>  pantoprazole    Tablet 40 milliGRAM(s) Oral before breakfast  polyethylene glycol 3350 17 Gram(s) Oral daily  senna 2 Tablet(s) Oral at bedtime  sodium chloride 3%  Inhalation 4 milliLiter(s) Inhalation every 6 hours  tamsulosin 0.4 milliGRAM(s) Oral at bedtime  timolol 0.5% Solution 1 Drop(s) Both EYES every 12 hours    MEDICATIONS  (PRN):  albuterol/ipratropium for Nebulization 3 milliLiter(s) Nebulizer every 6 hours PRN Shortness of Breath and/or Wheezing  naloxone Injectable 0.1 milliGRAM(s) IV Push every 3 minutes PRN For ANY of the following changes in patient status:  A. RR LESS THAN 10 breaths per minute, B. Oxygen saturation LESS THAN 90%, C. Sedation score of 6  ondansetron Injectable 4 milliGRAM(s) IV Push every 6 hours PRN Nausea  oxyCODONE    IR 5 milliGRAM(s) Oral every 6 hours PRN Severe Pain (7 - 10)          Physical exam:   General:               Pt appears to be in  pain but not in  distress                                                  Neuro:                  Nonfocal                             Cardiovascular:   S1 & S2, regular                           Respiratory:         Air entry is fair and equal on both sides, has bilateral conducted sounds                           GI:                          Soft, nondistended and nontender, Bowel sounds active                            Ext:                        No cyanosis or edema                               Labs:                                                                           12.6   12.60 )-----------( 142      ( 27 Aug 2020 00:15 )             36.5             08-27    132<L>  |  101  |  20  ----------------------------<  136<H>  3.9   |  20<L>  |  0.76    Ca    8.3<L>      27 Aug 2020 00:15  Mg     2.2                         PT/INR - ( 25 Aug 2020 20:10 )   PT: 12.1 SEC;   INR: 1.05          PTT - ( 25 Aug 2020 20:10 )  PTT:25.6 SEC  Transcutaneous Bilirubin        CXR:  < from: Xray Chest 1 View- PORTABLE-Urgent (20 @ 14:36) >    2left-sided chest tubes are unchanged from the last exam. Loss of volume in the left lung with slight increase in opacity from the last exam secondary to atelectasis. No pneumothorax. The right lung is clear.    COMPARISON:   at 12:28 AM    IMPRESSION:  Status post left thoracotomy with chest tubes.          Plan:   General: 85yMale s/p Flexible bronchoscopy, left robotic assisted VATS, pneumolysis, left upper lobectomy, partial pleurectomy and mediastinal lymph node dissection 20 , experiencing  pain with deep breathing.                             Neuro:                                         Pain control with   Tylenol / Lidoderm                            Cardiovascular:                                          Continue hemodynamic monitoring.    HTN: Restart Atenolol    HLD: On Lipitor                             Respiratory:                                         Pt is on RA     Atelectasis: Bronched yesterday. Continue 3% Saline inhalations / pulmozyme                                         Appears to be in pain, not in any distress.                                         Encourage incentive spirometry                                          Monitor chest tube output                                         Chest tube to water seal, has air leak.                                                                  Hx of TB: Continue airborne isolation                            GI                                         DASH diet as tolerated.                                          Continue Zofran / Reglan for nausea - PRN	                                                                 Renal:                                         Continue LR   30cc/hr                                         Monitor I/Os and electrolytes                                         BPH: On Flomax                                                 Hem/ Onc:                                                                                  Monitor chest tube output &  signs of bleeding.                                          CBC now &  in AM                           Infectious disease:      Hx of TB: Continue airborne isolation                                          Monitor for fever / leukocytosis.                                          All surgical incision / chest tube  sites look clean                            Endocrine                                             Continue Accu-Checks with coverage    Pt is on SQ Heparin and Venodyne boots for DVT prophylaxis.     Pertinent clinical, laboratory, radiographic, hemodynamic, echocardiographic, respiratory data, microbiologic data and chart were reviewed and analyzed frequently throughout the course of the day and night  Patient seen, examined and plan discussed with CT Surgeon Dr. Streeter  / CTICU team.      8T today        Anibal Pruitt MD

## 2020-08-27 NOTE — INPATIENT CERTIFICATION FOR MEDICARE PATIENTS - IN ORDER TO MEET MEDICARE REQUIREMENTS.
In order to meet Medicare requirements, the clinical documentation must support the information cited in the admission order.  Please be sure to provide detailed and clear documentation about the following in the admitting note/history and physical:
Normal rate, regular rhythm, normal S1, S2 heart sounds heard.

## 2020-08-28 LAB
ALBUMIN SERPL ELPH-MCNC: 3.2 G/DL — LOW (ref 3.3–5)
ALP SERPL-CCNC: 50 U/L — SIGNIFICANT CHANGE UP (ref 40–120)
ALT FLD-CCNC: 17 U/L — SIGNIFICANT CHANGE UP (ref 4–41)
ANION GAP SERPL CALC-SCNC: 12 MMO/L — SIGNIFICANT CHANGE UP (ref 7–14)
ANION GAP SERPL CALC-SCNC: 9 MMO/L — SIGNIFICANT CHANGE UP (ref 7–14)
AST SERPL-CCNC: 15 U/L — SIGNIFICANT CHANGE UP (ref 4–40)
BILIRUB SERPL-MCNC: 1.7 MG/DL — HIGH (ref 0.2–1.2)
BUN SERPL-MCNC: 19 MG/DL — SIGNIFICANT CHANGE UP (ref 7–23)
BUN SERPL-MCNC: 21 MG/DL — SIGNIFICANT CHANGE UP (ref 7–23)
CALCIUM SERPL-MCNC: 7.8 MG/DL — LOW (ref 8.4–10.5)
CALCIUM SERPL-MCNC: 8 MG/DL — LOW (ref 8.4–10.5)
CHLORIDE SERPL-SCNC: 101 MMOL/L — SIGNIFICANT CHANGE UP (ref 98–107)
CHLORIDE SERPL-SCNC: 97 MMOL/L — LOW (ref 98–107)
CO2 SERPL-SCNC: 21 MMOL/L — LOW (ref 22–31)
CO2 SERPL-SCNC: 24 MMOL/L — SIGNIFICANT CHANGE UP (ref 22–31)
CREAT SERPL-MCNC: 0.78 MG/DL — SIGNIFICANT CHANGE UP (ref 0.5–1.3)
CREAT SERPL-MCNC: 0.79 MG/DL — SIGNIFICANT CHANGE UP (ref 0.5–1.3)
GLUCOSE SERPL-MCNC: 124 MG/DL — HIGH (ref 70–99)
GLUCOSE SERPL-MCNC: 128 MG/DL — HIGH (ref 70–99)
HCT VFR BLD CALC: 35 % — LOW (ref 39–50)
HCT VFR BLD CALC: 36.5 % — LOW (ref 39–50)
HGB BLD-MCNC: 11.9 G/DL — LOW (ref 13–17)
HGB BLD-MCNC: 12.4 G/DL — LOW (ref 13–17)
MAGNESIUM SERPL-MCNC: 2.2 MG/DL — SIGNIFICANT CHANGE UP (ref 1.6–2.6)
MCHC RBC-ENTMCNC: 31.2 PG — SIGNIFICANT CHANGE UP (ref 27–34)
MCHC RBC-ENTMCNC: 31.2 PG — SIGNIFICANT CHANGE UP (ref 27–34)
MCHC RBC-ENTMCNC: 34 % — SIGNIFICANT CHANGE UP (ref 32–36)
MCHC RBC-ENTMCNC: 34 % — SIGNIFICANT CHANGE UP (ref 32–36)
MCV RBC AUTO: 91.6 FL — SIGNIFICANT CHANGE UP (ref 80–100)
MCV RBC AUTO: 91.9 FL — SIGNIFICANT CHANGE UP (ref 80–100)
NRBC # FLD: 0 K/UL — SIGNIFICANT CHANGE UP (ref 0–0)
NRBC # FLD: 0 K/UL — SIGNIFICANT CHANGE UP (ref 0–0)
PHOSPHATE SERPL-MCNC: 1.7 MG/DL — LOW (ref 2.5–4.5)
PLATELET # BLD AUTO: 138 K/UL — LOW (ref 150–400)
PLATELET # BLD AUTO: 151 K/UL — SIGNIFICANT CHANGE UP (ref 150–400)
PMV BLD: 10.3 FL — SIGNIFICANT CHANGE UP (ref 7–13)
PMV BLD: 9.9 FL — SIGNIFICANT CHANGE UP (ref 7–13)
POTASSIUM SERPL-MCNC: 3.9 MMOL/L — SIGNIFICANT CHANGE UP (ref 3.5–5.3)
POTASSIUM SERPL-MCNC: 4.1 MMOL/L — SIGNIFICANT CHANGE UP (ref 3.5–5.3)
POTASSIUM SERPL-SCNC: 3.9 MMOL/L — SIGNIFICANT CHANGE UP (ref 3.5–5.3)
POTASSIUM SERPL-SCNC: 4.1 MMOL/L — SIGNIFICANT CHANGE UP (ref 3.5–5.3)
PROT SERPL-MCNC: 6 G/DL — SIGNIFICANT CHANGE UP (ref 6–8.3)
RBC # BLD: 3.82 M/UL — LOW (ref 4.2–5.8)
RBC # BLD: 3.97 M/UL — LOW (ref 4.2–5.8)
RBC # FLD: 11.9 % — SIGNIFICANT CHANGE UP (ref 10.3–14.5)
RBC # FLD: 12 % — SIGNIFICANT CHANGE UP (ref 10.3–14.5)
SODIUM SERPL-SCNC: 130 MMOL/L — LOW (ref 135–145)
SODIUM SERPL-SCNC: 134 MMOL/L — LOW (ref 135–145)
WBC # BLD: 9.49 K/UL — SIGNIFICANT CHANGE UP (ref 3.8–10.5)
WBC # BLD: 9.88 K/UL — SIGNIFICANT CHANGE UP (ref 3.8–10.5)
WBC # FLD AUTO: 9.49 K/UL — SIGNIFICANT CHANGE UP (ref 3.8–10.5)
WBC # FLD AUTO: 9.88 K/UL — SIGNIFICANT CHANGE UP (ref 3.8–10.5)

## 2020-08-28 PROCEDURE — 71045 X-RAY EXAM CHEST 1 VIEW: CPT | Mod: 26

## 2020-08-28 PROCEDURE — 71045 X-RAY EXAM CHEST 1 VIEW: CPT | Mod: 26,77

## 2020-08-28 RX ORDER — ATENOLOL 25 MG/1
100 TABLET ORAL DAILY
Refills: 0 | Status: DISCONTINUED | OUTPATIENT
Start: 2020-08-28 | End: 2020-08-28

## 2020-08-28 RX ORDER — ATENOLOL 25 MG/1
25 TABLET ORAL DAILY
Refills: 0 | Status: DISCONTINUED | OUTPATIENT
Start: 2020-08-28 | End: 2020-08-31

## 2020-08-28 RX ORDER — SODIUM,POTASSIUM PHOSPHATES 278-250MG
1 POWDER IN PACKET (EA) ORAL ONCE
Refills: 0 | Status: COMPLETED | OUTPATIENT
Start: 2020-08-28 | End: 2020-08-28

## 2020-08-28 RX ORDER — ACETAMINOPHEN 500 MG
1000 TABLET ORAL ONCE
Refills: 0 | Status: COMPLETED | OUTPATIENT
Start: 2020-08-28 | End: 2020-08-28

## 2020-08-28 RX ORDER — ASPIRIN/CALCIUM CARB/MAGNESIUM 324 MG
81 TABLET ORAL DAILY
Refills: 0 | Status: DISCONTINUED | OUTPATIENT
Start: 2020-08-28 | End: 2020-08-29

## 2020-08-28 RX ADMIN — Medication 1 DROP(S): at 07:45

## 2020-08-28 RX ADMIN — HEPARIN SODIUM 5000 UNIT(S): 5000 INJECTION INTRAVENOUS; SUBCUTANEOUS at 22:50

## 2020-08-28 RX ADMIN — TAMSULOSIN HYDROCHLORIDE 0.4 MILLIGRAM(S): 0.4 CAPSULE ORAL at 22:22

## 2020-08-28 RX ADMIN — SODIUM CHLORIDE 4 MILLILITER(S): 9 INJECTION INTRAMUSCULAR; INTRAVENOUS; SUBCUTANEOUS at 04:12

## 2020-08-28 RX ADMIN — HEPARIN SODIUM 5000 UNIT(S): 5000 INJECTION INTRAVENOUS; SUBCUTANEOUS at 13:32

## 2020-08-28 RX ADMIN — Medication 1000 MILLIGRAM(S): at 19:00

## 2020-08-28 RX ADMIN — OXYCODONE HYDROCHLORIDE 5 MILLIGRAM(S): 5 TABLET ORAL at 09:27

## 2020-08-28 RX ADMIN — OXYCODONE HYDROCHLORIDE 5 MILLIGRAM(S): 5 TABLET ORAL at 10:00

## 2020-08-28 RX ADMIN — ATENOLOL 100 MILLIGRAM(S): 25 TABLET ORAL at 09:27

## 2020-08-28 RX ADMIN — HEPARIN SODIUM 5000 UNIT(S): 5000 INJECTION INTRAVENOUS; SUBCUTANEOUS at 07:44

## 2020-08-28 RX ADMIN — SODIUM CHLORIDE 4 MILLILITER(S): 9 INJECTION INTRAMUSCULAR; INTRAVENOUS; SUBCUTANEOUS at 22:55

## 2020-08-28 RX ADMIN — POLYETHYLENE GLYCOL 3350 17 GRAM(S): 17 POWDER, FOR SOLUTION ORAL at 11:39

## 2020-08-28 RX ADMIN — SODIUM CHLORIDE 4 MILLILITER(S): 9 INJECTION INTRAMUSCULAR; INTRAVENOUS; SUBCUTANEOUS at 11:07

## 2020-08-28 RX ADMIN — OXYCODONE HYDROCHLORIDE 5 MILLIGRAM(S): 5 TABLET ORAL at 17:44

## 2020-08-28 RX ADMIN — Medication 400 MILLIGRAM(S): at 19:22

## 2020-08-28 RX ADMIN — Medication 1 PACKET(S): at 09:43

## 2020-08-28 RX ADMIN — ONDANSETRON 4 MILLIGRAM(S): 8 TABLET, FILM COATED ORAL at 11:39

## 2020-08-28 RX ADMIN — OXYCODONE HYDROCHLORIDE 5 MILLIGRAM(S): 5 TABLET ORAL at 19:00

## 2020-08-28 RX ADMIN — Medication 81 MILLIGRAM(S): at 11:39

## 2020-08-28 RX ADMIN — Medication 1 DROP(S): at 17:44

## 2020-08-28 RX ADMIN — PANTOPRAZOLE SODIUM 40 MILLIGRAM(S): 20 TABLET, DELAYED RELEASE ORAL at 07:45

## 2020-08-28 RX ADMIN — ATORVASTATIN CALCIUM 10 MILLIGRAM(S): 80 TABLET, FILM COATED ORAL at 22:21

## 2020-08-28 RX ADMIN — SODIUM CHLORIDE 4 MILLILITER(S): 9 INJECTION INTRAMUSCULAR; INTRAVENOUS; SUBCUTANEOUS at 16:21

## 2020-08-28 NOTE — PROGRESS NOTE ADULT - SUBJECTIVE AND OBJECTIVE BOX
Subjective: Pt resting well in bed this AM. No acute events overnight. Denies fever/chills, CP, SOB, nausea, vomiting, and abd pain. No other concerns this AM.    Vital Signs:  Vital Signs Last 24 Hrs  T(C): 36.8 (08-28-20 @ 07:00), Max: 36.8 (08-28-20 @ 07:00)  T(F): 98.2 (08-28-20 @ 07:00), Max: 98.2 (08-28-20 @ 07:00)  HR: 65 (08-28-20 @ 07:00) (64 - 68)  BP: 120/62 (08-28-20 @ 07:00) (108/55 - 133/70)  RR: 18 (08-28-20 @ 07:00) (18 - 22)  SpO2: 95% (08-28-20 @ 07:00) (91% - 98%) on (O2)    Telemetry/Alarms: sinus rhythm  General: WN/WD NAD  Neurology: Awake, nonfocal, IRVIN x 4  Eyes: Scleras clear, PERRLA/ EOMI, Gross vision intact  ENT: Gross hearing intact, grossly patent pharynx, no stridor  Neck: Neck supple, trachea midline, No JVD,   Respiratory: CTA B/L, No wheezing, rales, rhonchi  CV: RRR, S1S2, no murmurs, rubs or gallops  Abdominal: Soft, NT, ND +BS,   Extremities: No edema, + peripheral pulses  Skin: No Rashes, Hematoma, Ecchymosis  Lymphatic: No Neck, axilla, groin LAD  Psych: Oriented x 3, normal affect  Incisions: incision sites are clear, dry, and intact  Tubes: L angled CT 250cc/24hours on wa	terseal. L straight CT 140cc/24hours on waterseal  Relevant labs, radiology and Medications reviewed. CXR - stable                        12.4   9.49  )-----------( 138      ( 28 Aug 2020 06:27 )             36.5     08-28    134<L>  |  101  |  19  ----------------------------<  124<H>  3.9   |  24  |  0.79    Ca    7.8<L>      28 Aug 2020 06:27  Phos  1.7     08-28  Mg     2.2     08-28    TPro  6.0  /  Alb  3.2<L>  /  TBili  1.7<H>  /  DBili  x   /  AST  15  /  ALT  17  /  AlkPhos  50  08-28      MEDICATIONS  (STANDING):  acetaminophen  IVPB .. 1000 milliGRAM(s) IV Intermittent once  acetaminophen  IVPB .. 1000 milliGRAM(s) IV Intermittent once  acetaminophen  IVPB .. 1000 milliGRAM(s) IV Intermittent once  aspirin enteric coated 81 milliGRAM(s) Oral daily  ATENolol  Tablet 100 milliGRAM(s) Oral daily  atorvastatin 10 milliGRAM(s) Oral at bedtime  heparin   Injectable 5000 Unit(s) SubCutaneous every 8 hours  lactated ringers. 500 milliLiter(s) (30 mL/Hr) IV Continuous <Continuous>  pantoprazole    Tablet 40 milliGRAM(s) Oral before breakfast  polyethylene glycol 3350 17 Gram(s) Oral daily  senna 2 Tablet(s) Oral at bedtime  sodium chloride 3%  Inhalation 4 milliLiter(s) Inhalation every 6 hours  tamsulosin 0.4 milliGRAM(s) Oral at bedtime  timolol 0.5% Solution 1 Drop(s) Both EYES every 12 hours    MEDICATIONS  (PRN):  albuterol/ipratropium for Nebulization 3 milliLiter(s) Nebulizer every 6 hours PRN Shortness of Breath and/or Wheezing  naloxone Injectable 0.1 milliGRAM(s) IV Push every 3 minutes PRN For ANY of the following changes in patient status:  A. RR LESS THAN 10 breaths per minute, B. Oxygen saturation LESS THAN 90%, C. Sedation score of 6  ondansetron Injectable 4 milliGRAM(s) IV Push every 6 hours PRN Nausea  oxyCODONE    IR 5 milliGRAM(s) Oral every 6 hours PRN Severe Pain (7 - 10)    Pertinent Physical Exam  I&O's Summary    27 Aug 2020 07:01  -  28 Aug 2020 07:00  --------------------------------------------------------  IN: 610 mL / OUT: 1165 mL / NET: -555 mL    28 Aug 2020 07:01  -  28 Aug 2020 08:41  --------------------------------------------------------  IN: 30 mL / OUT: 80 mL / NET: -50 mL     Assessment  85y Male  w/ PAST MEDICAL & SURGICAL HISTORY:  Glaucoma  Tuberculosis  Dementia  Arthritis  History of BPH  Malignant neoplasm of unspecified part of left bronchus or lung  Hyperlipidemia  Hypertension  History of lung biopsy  Pacemaker: [employer]  model [id number]  serial [id number]  date 7/1/2014  S/P total knee replacement, left    85yMale s/p Flexible bronchoscopy, left robotic assisted VATS, pneumolysis, left upper lobectomy, partial pleurectomy and mediastinal lymph node dissection 8/25/20 , experiencing  pain with deep breathing.    PLAN  Neuro: Pain management  Pulm: Encourage coughing, deep breathing and use of incentive spirometry. Wean off supplemental oxygen as able. Daily CXR.   Cardio: Monitor telemetry/alarms  GI: Tolerating diet. Continue stool softeners.  Renal: monitor urine output, supplement electrolytes as needed  Vasc: HSQ for DVT prophylaxis  Heme: Stable H/H  ID: Off antibiotics. Stable.  Therapy: OOB/ambulate. Aggressive chest PT  Tubes: Monitor Chest tube output  Discussed with Cardiothoracic Team at AM rounds.    Gt Berkowitz, PGY-1  Thoracic Surgery  [id number] Subjective: Pt resting well in chair this AM. No acute events overnight. Spoke with pt via  (ID: [id number]). Pt states he has been feeling dizzy as of today. Pt also states that he is nauseated and has not had a BM since he was at home. He states that his breathing has improved since the surgery was completed. Denies fever/chills, CP, SOB, vomiting, and abd pain. No other concerns this AM.    Vital Signs:  Vital Signs Last 24 Hrs  T(C): 36.8 (08-28-20 @ 07:00), Max: 36.8 (08-28-20 @ 07:00)  T(F): 98.2 (08-28-20 @ 07:00), Max: 98.2 (08-28-20 @ 07:00)  HR: 65 (08-28-20 @ 07:00) (64 - 68)  BP: 120/62 (08-28-20 @ 07:00) (108/55 - 133/70)  RR: 18 (08-28-20 @ 07:00) (18 - 22)  SpO2: 95% (08-28-20 @ 07:00) (91% - 98%) on (O2)    Telemetry/Alarms: sinus rhythm  General: WN/WD NAD  Neurology: Awake, nonfocal, IRVIN x 4  Eyes: Scleras clear, PERRLA/ EOMI, Gross vision intact  ENT: Gross hearing intact, grossly patent pharynx, no stridor  Neck: Neck supple, trachea midline, No JVD,   Respiratory: CTA B/L, No wheezing, rales, rhonchi  CV: RRR, S1S2, no murmurs, rubs or gallops  Abdominal: Soft, NT, ND +BS,   Extremities: No edema, + peripheral pulses  Skin: No Rashes, Hematoma, Ecchymosis  Lymphatic: No Neck, axilla, groin LAD  Psych: Oriented x 3, normal affect  Incisions: incision sites are clear, dry, and intact  Tubes: L angled CT 250cc/24hours on wa	terseal. L straight CT 140cc/24hours on waterseal  Relevant labs, radiology and Medications reviewed. CXR - stable                        12.4   9.49  )-----------( 138      ( 28 Aug 2020 06:27 )             36.5     08-28    134<L>  |  101  |  19  ----------------------------<  124<H>  3.9   |  24  |  0.79    Ca    7.8<L>      28 Aug 2020 06:27  Phos  1.7     08-28  Mg     2.2     08-28    TPro  6.0  /  Alb  3.2<L>  /  TBili  1.7<H>  /  DBili  x   /  AST  15  /  ALT  17  /  AlkPhos  50  08-28      MEDICATIONS  (STANDING):  acetaminophen  IVPB .. 1000 milliGRAM(s) IV Intermittent once  acetaminophen  IVPB .. 1000 milliGRAM(s) IV Intermittent once  acetaminophen  IVPB .. 1000 milliGRAM(s) IV Intermittent once  aspirin enteric coated 81 milliGRAM(s) Oral daily  ATENolol  Tablet 100 milliGRAM(s) Oral daily  atorvastatin 10 milliGRAM(s) Oral at bedtime  heparin   Injectable 5000 Unit(s) SubCutaneous every 8 hours  lactated ringers. 500 milliLiter(s) (30 mL/Hr) IV Continuous <Continuous>  pantoprazole    Tablet 40 milliGRAM(s) Oral before breakfast  polyethylene glycol 3350 17 Gram(s) Oral daily  senna 2 Tablet(s) Oral at bedtime  sodium chloride 3%  Inhalation 4 milliLiter(s) Inhalation every 6 hours  tamsulosin 0.4 milliGRAM(s) Oral at bedtime  timolol 0.5% Solution 1 Drop(s) Both EYES every 12 hours    MEDICATIONS  (PRN):  albuterol/ipratropium for Nebulization 3 milliLiter(s) Nebulizer every 6 hours PRN Shortness of Breath and/or Wheezing  naloxone Injectable 0.1 milliGRAM(s) IV Push every 3 minutes PRN For ANY of the following changes in patient status:  A. RR LESS THAN 10 breaths per minute, B. Oxygen saturation LESS THAN 90%, C. Sedation score of 6  ondansetron Injectable 4 milliGRAM(s) IV Push every 6 hours PRN Nausea  oxyCODONE    IR 5 milliGRAM(s) Oral every 6 hours PRN Severe Pain (7 - 10)    Pertinent Physical Exam  I&O's Summary    27 Aug 2020 07:01  -  28 Aug 2020 07:00  --------------------------------------------------------  IN: 610 mL / OUT: 1165 mL / NET: -555 mL    28 Aug 2020 07:01  -  28 Aug 2020 08:41  --------------------------------------------------------  IN: 30 mL / OUT: 80 mL / NET: -50 mL     Assessment  85y Male  w/ PAST MEDICAL & SURGICAL HISTORY:  Glaucoma  Tuberculosis  Dementia  Arthritis  History of BPH  Malignant neoplasm of unspecified part of left bronchus or lung  Hyperlipidemia  Hypertension  History of lung biopsy  Pacemaker: [employer]  model [id number]  serial [id number]  date 7/1/2014  S/P total knee replacement, left    85yMale s/p Flexible bronchoscopy, left robotic assisted VATS, pneumolysis, left upper lobectomy, partial pleurectomy and mediastinal lymph node dissection 8/25/20 , experiencing  pain with deep breathing.    PLAN  Neuro: Pain management  Pulm: Encourage coughing, deep breathing and use of incentive spirometry. Wean off supplemental oxygen as able. Daily CXR.   Cardio: Monitor telemetry/alarms  GI: Tolerating diet. Continue stool softeners.  Renal: monitor urine output, supplement electrolytes as needed  Vasc: HSQ for DVT prophylaxis  Heme: Stable H/H  ID: Off antibiotics. Stable.  Therapy: OOB/ambulate. Aggressive chest PT  Tubes: Monitor Chest tube output  Discussed with Cardiothoracic Team at AM rounds.    Gt Berkowitz, PGY-1  Thoracic Surgery  [id number]

## 2020-08-28 NOTE — PROVIDER CONTACT NOTE (OTHER) - BACKGROUND
Patient admitted 8/25, s/p left upper lobectomy with 2 chest tubes set to water seal. History of malignant neoplasm of left lung, tuberculosis 2019.

## 2020-08-29 LAB
ANION GAP SERPL CALC-SCNC: 11 MMO/L — SIGNIFICANT CHANGE UP (ref 7–14)
APTT BLD: 27.1 SEC — SIGNIFICANT CHANGE UP (ref 27–36.3)
BLD GP AB SCN SERPL QL: NEGATIVE — SIGNIFICANT CHANGE UP
BUN SERPL-MCNC: 20 MG/DL — SIGNIFICANT CHANGE UP (ref 7–23)
CALCIUM SERPL-MCNC: 7.9 MG/DL — LOW (ref 8.4–10.5)
CHLORIDE SERPL-SCNC: 100 MMOL/L — SIGNIFICANT CHANGE UP (ref 98–107)
CO2 SERPL-SCNC: 22 MMOL/L — SIGNIFICANT CHANGE UP (ref 22–31)
CREAT SERPL-MCNC: 0.76 MG/DL — SIGNIFICANT CHANGE UP (ref 0.5–1.3)
GLUCOSE SERPL-MCNC: 123 MG/DL — HIGH (ref 70–99)
HCT VFR BLD CALC: 37.8 % — LOW (ref 39–50)
HGB BLD-MCNC: 13 G/DL — SIGNIFICANT CHANGE UP (ref 13–17)
INR BLD: 1.21 — HIGH (ref 0.88–1.16)
MAGNESIUM SERPL-MCNC: 2.3 MG/DL — SIGNIFICANT CHANGE UP (ref 1.6–2.6)
MCHC RBC-ENTMCNC: 32.2 PG — SIGNIFICANT CHANGE UP (ref 27–34)
MCHC RBC-ENTMCNC: 34.4 % — SIGNIFICANT CHANGE UP (ref 32–36)
MCV RBC AUTO: 93.6 FL — SIGNIFICANT CHANGE UP (ref 80–100)
NRBC # FLD: 0 K/UL — SIGNIFICANT CHANGE UP (ref 0–0)
PHOSPHATE SERPL-MCNC: 2.5 MG/DL — SIGNIFICANT CHANGE UP (ref 2.5–4.5)
PLATELET # BLD AUTO: 147 K/UL — LOW (ref 150–400)
PMV BLD: 10.5 FL — SIGNIFICANT CHANGE UP (ref 7–13)
POTASSIUM SERPL-MCNC: 4 MMOL/L — SIGNIFICANT CHANGE UP (ref 3.5–5.3)
POTASSIUM SERPL-SCNC: 4 MMOL/L — SIGNIFICANT CHANGE UP (ref 3.5–5.3)
PROTHROM AB SERPL-ACNC: 13.7 SEC — HIGH (ref 10.6–13.6)
RBC # BLD: 4.04 M/UL — LOW (ref 4.2–5.8)
RBC # FLD: 12 % — SIGNIFICANT CHANGE UP (ref 10.3–14.5)
RH IG SCN BLD-IMP: POSITIVE — SIGNIFICANT CHANGE UP
SARS-COV-2 RNA SPEC QL NAA+PROBE: SIGNIFICANT CHANGE UP
SODIUM SERPL-SCNC: 133 MMOL/L — LOW (ref 135–145)
WBC # BLD: 8.74 K/UL — SIGNIFICANT CHANGE UP (ref 3.8–10.5)
WBC # FLD AUTO: 8.74 K/UL — SIGNIFICANT CHANGE UP (ref 3.8–10.5)

## 2020-08-29 PROCEDURE — 99291 CRITICAL CARE FIRST HOUR: CPT

## 2020-08-29 PROCEDURE — 71275 CT ANGIOGRAPHY CHEST: CPT | Mod: 26

## 2020-08-29 PROCEDURE — 71045 X-RAY EXAM CHEST 1 VIEW: CPT | Mod: 26,76

## 2020-08-29 RX ORDER — VANCOMYCIN HCL 1 G
1000 VIAL (EA) INTRAVENOUS EVERY 12 HOURS
Refills: 0 | Status: DISCONTINUED | OUTPATIENT
Start: 2020-08-30 | End: 2020-08-31

## 2020-08-29 RX ORDER — SODIUM CHLORIDE 9 MG/ML
1000 INJECTION, SOLUTION INTRAVENOUS
Refills: 0 | Status: DISCONTINUED | OUTPATIENT
Start: 2020-08-29 | End: 2020-08-30

## 2020-08-29 RX ORDER — VANCOMYCIN HCL 1 G
VIAL (EA) INTRAVENOUS
Refills: 0 | Status: DISCONTINUED | OUTPATIENT
Start: 2020-08-29 | End: 2020-08-31

## 2020-08-29 RX ORDER — LIDOCAINE HCL 20 MG/ML
4 VIAL (ML) INJECTION ONCE
Refills: 0 | Status: COMPLETED | OUTPATIENT
Start: 2020-08-29 | End: 2020-08-29

## 2020-08-29 RX ORDER — KETAMINE HYDROCHLORIDE 100 MG/ML
15 INJECTION INTRAMUSCULAR; INTRAVENOUS ONCE
Refills: 0 | Status: DISCONTINUED | OUTPATIENT
Start: 2020-08-29 | End: 2020-08-29

## 2020-08-29 RX ORDER — PIPERACILLIN AND TAZOBACTAM 4; .5 G/20ML; G/20ML
3.38 INJECTION, POWDER, LYOPHILIZED, FOR SOLUTION INTRAVENOUS ONCE
Refills: 0 | Status: COMPLETED | OUTPATIENT
Start: 2020-08-29 | End: 2020-08-29

## 2020-08-29 RX ORDER — KETAMINE HYDROCHLORIDE 100 MG/ML
20 INJECTION INTRAMUSCULAR; INTRAVENOUS ONCE
Refills: 0 | Status: DISCONTINUED | OUTPATIENT
Start: 2020-08-29 | End: 2020-08-29

## 2020-08-29 RX ORDER — LIDOCAINE 4 G/100G
4 CREAM TOPICAL ONCE
Refills: 0 | Status: COMPLETED | OUTPATIENT
Start: 2020-08-29 | End: 2020-08-29

## 2020-08-29 RX ORDER — IPRATROPIUM/ALBUTEROL SULFATE 18-103MCG
3 AEROSOL WITH ADAPTER (GRAM) INHALATION EVERY 6 HOURS
Refills: 0 | Status: DISCONTINUED | OUTPATIENT
Start: 2020-08-29 | End: 2020-08-30

## 2020-08-29 RX ORDER — SODIUM CHLORIDE 9 MG/ML
4 INJECTION INTRAMUSCULAR; INTRAVENOUS; SUBCUTANEOUS EVERY 6 HOURS
Refills: 0 | Status: DISCONTINUED | OUTPATIENT
Start: 2020-08-29 | End: 2020-08-30

## 2020-08-29 RX ORDER — VANCOMYCIN HCL 1 G
1000 VIAL (EA) INTRAVENOUS ONCE
Refills: 0 | Status: COMPLETED | OUTPATIENT
Start: 2020-08-29 | End: 2020-08-29

## 2020-08-29 RX ORDER — ACETAMINOPHEN 500 MG
1000 TABLET ORAL ONCE
Refills: 0 | Status: COMPLETED | OUTPATIENT
Start: 2020-08-29 | End: 2020-08-29

## 2020-08-29 RX ORDER — PIPERACILLIN AND TAZOBACTAM 4; .5 G/20ML; G/20ML
3.38 INJECTION, POWDER, LYOPHILIZED, FOR SOLUTION INTRAVENOUS EVERY 8 HOURS
Refills: 0 | Status: DISCONTINUED | OUTPATIENT
Start: 2020-08-29 | End: 2020-09-03

## 2020-08-29 RX ADMIN — Medication 1 DROP(S): at 05:25

## 2020-08-29 RX ADMIN — Medication 250 MILLIGRAM(S): at 16:40

## 2020-08-29 RX ADMIN — SODIUM CHLORIDE 4 MILLILITER(S): 9 INJECTION INTRAMUSCULAR; INTRAVENOUS; SUBCUTANEOUS at 04:02

## 2020-08-29 RX ADMIN — ONDANSETRON 4 MILLIGRAM(S): 8 TABLET, FILM COATED ORAL at 10:48

## 2020-08-29 RX ADMIN — ATENOLOL 25 MILLIGRAM(S): 25 TABLET ORAL at 05:24

## 2020-08-29 RX ADMIN — Medication 1000 MILLIGRAM(S): at 23:24

## 2020-08-29 RX ADMIN — Medication 1 DROP(S): at 22:29

## 2020-08-29 RX ADMIN — SODIUM CHLORIDE 4 MILLILITER(S): 9 INJECTION INTRAMUSCULAR; INTRAVENOUS; SUBCUTANEOUS at 11:43

## 2020-08-29 RX ADMIN — Medication 3 MILLILITER(S): at 11:44

## 2020-08-29 RX ADMIN — SODIUM CHLORIDE 75 MILLILITER(S): 9 INJECTION, SOLUTION INTRAVENOUS at 22:28

## 2020-08-29 RX ADMIN — Medication 4 MILLILITER(S): at 13:47

## 2020-08-29 RX ADMIN — TAMSULOSIN HYDROCHLORIDE 0.4 MILLIGRAM(S): 0.4 CAPSULE ORAL at 22:29

## 2020-08-29 RX ADMIN — KETAMINE HYDROCHLORIDE 15 MILLIGRAM(S): 100 INJECTION INTRAMUSCULAR; INTRAVENOUS at 14:00

## 2020-08-29 RX ADMIN — PANTOPRAZOLE SODIUM 40 MILLIGRAM(S): 20 TABLET, DELAYED RELEASE ORAL at 06:56

## 2020-08-29 RX ADMIN — ATORVASTATIN CALCIUM 10 MILLIGRAM(S): 80 TABLET, FILM COATED ORAL at 22:29

## 2020-08-29 RX ADMIN — PIPERACILLIN AND TAZOBACTAM 25 GRAM(S): 4; .5 INJECTION, POWDER, LYOPHILIZED, FOR SOLUTION INTRAVENOUS at 22:29

## 2020-08-29 RX ADMIN — HEPARIN SODIUM 5000 UNIT(S): 5000 INJECTION INTRAVENOUS; SUBCUTANEOUS at 05:24

## 2020-08-29 RX ADMIN — Medication 400 MILLIGRAM(S): at 22:29

## 2020-08-29 RX ADMIN — PIPERACILLIN AND TAZOBACTAM 200 GRAM(S): 4; .5 INJECTION, POWDER, LYOPHILIZED, FOR SOLUTION INTRAVENOUS at 16:39

## 2020-08-29 NOTE — PROVIDER CONTACT NOTE (OTHER) - DATE AND TIME:
Result of vaginal culture given to patient  Culture positive for BV  Rx   For flagyl pended  Wants to discuss alternative therapies as she states she gets BV frequently  Will call back to schedule appointment 28-Aug-2020 22:45

## 2020-08-29 NOTE — PROVIDER CONTACT NOTE (OTHER) - ASSESSMENT
Pt denies chest pain/generalized pain, nausea, bloody vomiting, shortness of breath. Chest tube site is dry and intact. No sub q air. No air leak. Vitals stable.

## 2020-08-29 NOTE — PROGRESS NOTE ADULT - SUBJECTIVE AND OBJECTIVE BOX
Subjective: Pt resting in bed this AM. Overnight, pt coughing up thick bloody sputum that improved as of this morning. Spoke with pt via  (ID: [id number]). Pt states he has been feeling dizzy as of today. Pt also states that he is nauseated and has not had a BM since he was at home. He states that his breathing has improved since the surgery was completed. Denies fever/chills, CP, SOB, vomiting, and abd pain. No other concerns this AM.    Vital Signs:  Vital Signs Last 24 Hrs  T(C): 36.9 (08-29-20 @ 08:40), Max: 37 (08-28-20 @ 20:50)  T(F): 98.4 (08-29-20 @ 08:40), Max: 98.6 (08-28-20 @ 20:50)  HR: 65 (08-29-20 @ 08:40) (63 - 69)  BP: 116/64 (08-29-20 @ 08:40) (109/59 - 127/60)  RR: 18 (08-29-20 @ 08:40) (18 - 18)  SpO2: 97% (08-29-20 @ 08:40) (94% - 99%) on (O2)    Telemetry/Alarms:  General: WN/WD NAD  Neurology: Awake, nonfocal, IRVIN x 4  Eyes: Scleras clear, PERRLA/ EOMI, Gross vision intact  ENT:Gross hearing intact, grossly patent pharynx, no stridor  Neck: Neck supple, trachea midline, No JVD,   Respiratory: CTA B/L, No wheezing, rales, rhonchi  CV: RRR, S1S2, no murmurs, rubs or gallops  Abdominal: Soft, NT, ND +BS,   Extremities: No edema, + peripheral pulses  Skin: No Rashes, Hematoma, Ecchymosis  Lymphatic: No Neck, axilla, groin LAD  Psych: Oriented x 3, normal affect  Incisions:   Tubes:  Relevant labs, radiology and Medications reviewed: CXR - L side appears worse this AM                        13.0   8.74  )-----------( 147      ( 29 Aug 2020 05:30 )             37.8     08-29    133<L>  |  100  |  20  ----------------------------<  123<H>  4.0   |  22  |  0.76    Ca    7.9<L>      29 Aug 2020 05:30  Phos  2.5     08-29  Mg     2.3     08-29    TPro  6.0  /  Alb  3.2<L>  /  TBili  1.7<H>  /  DBili  x   /  AST  15  /  ALT  17  /  AlkPhos  50  08-28      MEDICATIONS  (STANDING):  acetaminophen  IVPB .. 1000 milliGRAM(s) IV Intermittent once  acetaminophen  IVPB .. 1000 milliGRAM(s) IV Intermittent once  acetaminophen  IVPB .. 1000 milliGRAM(s) IV Intermittent once  albuterol/ipratropium for Nebulization 3 milliLiter(s) Nebulizer every 6 hours  ATENolol  Tablet 25 milliGRAM(s) Oral daily  atorvastatin 10 milliGRAM(s) Oral at bedtime  levoFLOXacin IVPB 500 milliGRAM(s) IV Intermittent once  levoFLOXacin IVPB      pantoprazole    Tablet 40 milliGRAM(s) Oral before breakfast  polyethylene glycol 3350 17 Gram(s) Oral daily  senna 2 Tablet(s) Oral at bedtime  tamsulosin 0.4 milliGRAM(s) Oral at bedtime  timolol 0.5% Solution 1 Drop(s) Both EYES every 12 hours    MEDICATIONS  (PRN):  naloxone Injectable 0.1 milliGRAM(s) IV Push every 3 minutes PRN For ANY of the following changes in patient status:  A. RR LESS THAN 10 breaths per minute, B. Oxygen saturation LESS THAN 90%, C. Sedation score of 6  ondansetron Injectable 4 milliGRAM(s) IV Push every 6 hours PRN Nausea  oxyCODONE    IR 5 milliGRAM(s) Oral every 6 hours PRN Severe Pain (7 - 10)    Pertinent Physical Exam  I&O's Summary    28 Aug 2020 07:01  -  29 Aug 2020 07:00  --------------------------------------------------------  IN: 30 mL / OUT: 830 mL / NET: -800 mL    29 Aug 2020 07:01  -  29 Aug 2020 09:49  --------------------------------------------------------  IN: 0 mL / OUT: 560 mL / NET: -560 mL      Marital Status:  (   )    (   ) Single    (   )    (  )   Lives with: (  ) alone  (  ) children   (  ) spouse   (  ) parents  (  ) other  Recent Travel: No recent travel  Occupation:    Substance Use (street drugs): ( x ) never used  (  ) other:  Tobacco Usage:  ( x  ) never smoked   (   ) former smoker   (   ) current smoker  (     ) pack year  Alcohol Usage: None     Assessment  85y Male  w/ PAST MEDICAL & SURGICAL HISTORY:  Glaucoma  Tuberculosis  Dementia  Arthritis  History of BPH  Malignant neoplasm of unspecified part of left bronchus or lung  Hyperlipidemia  Hypertension  History of lung biopsy  Pacemaker: [employer]  model [id number]  serial [id number]  date 7/1/2014  S/P total knee replacement, left  admitted with complaints of Patient is a 85y old  Male who presents with a chief complaint of malignant neoplasm of unspecified part of left bronchus or lung (18 Aug 2020 14:08)  .  On (Date), patient underwent Robot-assisted mediastinal lymphadenectomy  VATS, with partial pleurectomy  Thoracoscopic pneumolysis  Robot-assisted lobectomy of left lung with video-assisted thoracoscopic surgery (VATS)  Bronchoscopy, flexible, by CT surgery  . Postoperative course/issues:    PLAN  Neuro: Pain management  Pulm: Encourage coughing, deep breathing and use of incentive spirometry. Wean off supplemental oxygen as able. Daily CXR.   Cardio: Monitor telemetry/alarms  GI: Tolerating diet. Continue stool softeners.  Renal: monitor urine output, supplement electrolytes as needed  Vasc: Heparin SC/SCDs for DVT prophylaxis  Heme: Stable H/H. .   ID: Off antibiotics. Stable.  Therapy: OOB/ambulate  Tubes: Monitor Chest tube output  Disposition: Aim to D/C to home on  Discussed with Cardiothoracic Team at AM rounds. Subjective: Pt resting in chair this AM. Overnight, pt coughing up thick bloody sputum that improved as of this morning. Spoke with pt via  (ID: [id number]). Pt states that he is feeling well this AM. He states that his breathing has improved since the surgery was completed and that the chest tightness he was experiencing in the past is better. Denies fever/chills, vomiting, and abd pain. No other concerns this AM. Pt was wanting to know what the next steps of his treatment are at the hospital.    Vital Signs:  Vital Signs Last 24 Hrs  T(C): 36.9 (08-29-20 @ 08:40), Max: 37 (08-28-20 @ 20:50)  T(F): 98.4 (08-29-20 @ 08:40), Max: 98.6 (08-28-20 @ 20:50)  HR: 65 (08-29-20 @ 08:40) (63 - 69)  BP: 116/64 (08-29-20 @ 08:40) (109/59 - 127/60)  RR: 18 (08-29-20 @ 08:40) (18 - 18)  SpO2: 97% (08-29-20 @ 08:40) (94% - 99%) on (O2)    Telemetry/Alarms: sinus rhythm  General: WN/WD NAD  Neurology: Awake, nonfocal, IRVIN x 4  Eyes: Scleras clear, PERRLA/ EOMI, Gross vision intact  ENT: Gross hearing intact, grossly patent pharynx, no stridor  Neck: Neck supple, trachea midline, No JVD,   Respiratory: Decreased breath sounds over the L lung bases, No wheezing, rales, rhonchi  CV: RRR, S1S2, no murmurs, rubs or gallops  Abdominal: Soft, NT, ND +BS,   Extremities: No edema, + peripheral pulses  Skin: No Rashes, Hematoma, Ecchymosis  Lymphatic: No Neck, axilla, groin LAD  Psych: Oriented x 3, normal affect  Incisions: incision sites are clear, dry, and intact  Tubes: L angled CT 140cc/24hours on wa	terseal. L straight CT out yesterday.  Relevant labs, radiology and Medications reviewed. CXR - L lung appears worse                        13.0   8.74  )-----------( 147      ( 29 Aug 2020 05:30 )             37.8     08-29    133<L>  |  100  |  20  ----------------------------<  123<H>  4.0   |  22  |  0.76    Ca    7.9<L>      29 Aug 2020 05:30  Phos  2.5     08-29  Mg     2.3     08-29    TPro  6.0  /  Alb  3.2<L>  /  TBili  1.7<H>  /  DBili  x   /  AST  15  /  ALT  17  /  AlkPhos  50  08-28      MEDICATIONS  (STANDING):  acetaminophen  IVPB .. 1000 milliGRAM(s) IV Intermittent once  acetaminophen  IVPB .. 1000 milliGRAM(s) IV Intermittent once  acetaminophen  IVPB .. 1000 milliGRAM(s) IV Intermittent once  albuterol/ipratropium for Nebulization 3 milliLiter(s) Nebulizer every 6 hours  ATENolol  Tablet 25 milliGRAM(s) Oral daily  atorvastatin 10 milliGRAM(s) Oral at bedtime  levoFLOXacin IVPB 500 milliGRAM(s) IV Intermittent once  levoFLOXacin IVPB      pantoprazole    Tablet 40 milliGRAM(s) Oral before breakfast  polyethylene glycol 3350 17 Gram(s) Oral daily  senna 2 Tablet(s) Oral at bedtime  tamsulosin 0.4 milliGRAM(s) Oral at bedtime  timolol 0.5% Solution 1 Drop(s) Both EYES every 12 hours    MEDICATIONS  (PRN):  naloxone Injectable 0.1 milliGRAM(s) IV Push every 3 minutes PRN For ANY of the following changes in patient status:  A. RR LESS THAN 10 breaths per minute, B. Oxygen saturation LESS THAN 90%, C. Sedation score of 6  ondansetron Injectable 4 milliGRAM(s) IV Push every 6 hours PRN Nausea  oxyCODONE    IR 5 milliGRAM(s) Oral every 6 hours PRN Severe Pain (7 - 10)    Pertinent Physical Exam  I&O's Summary    28 Aug 2020 07:01  -  29 Aug 2020 07:00  --------------------------------------------------------  IN: 30 mL / OUT: 830 mL / NET: -800 mL    29 Aug 2020 07:01  -  29 Aug 2020 09:49  --------------------------------------------------------  IN: 0 mL / OUT: 560 mL / NET: -560 mL     Assessment  85y Male  w/ PAST MEDICAL & SURGICAL HISTORY:  Glaucoma  Tuberculosis  Dementia  Arthritis  History of BPH  Malignant neoplasm of unspecified part of left bronchus or lung  Hyperlipidemia  Hypertension  History of lung biopsy  Pacemaker: [employer]  model [id number]  serial [id number]  date 7/1/2014  S/P total knee replacement, left    85yMale s/p Flexible bronchoscopy, left robotic assisted VATS, pneumolysis, left upper lobectomy, partial pleurectomy and mediastinal lymph node dissection 8/25/20 , experiencing  pain with deep breathing. Overnight, pt coughing up thick bloody sputum that improved as of this morning. Pt's CXR over the R side also appears worse this AM. For these reasons, started pt on nebulizer, sputum culture ordered, started on levofloxacin, and stopped ASA/HSQ.    PLAN  Neuro: Pain management  Pulm: Encourage coughing, deep breathing and use of incentive spirometry. Wean off supplemental oxygen as able. Daily CXR. Asked pt to cough up bloody sputum into provided cup this AM. Started levofloxacin. Ordered sputum cultures. Stopped ASA and HSQ. Started nebulizer.  Cardio: Monitor telemetry/alarms  GI: Tolerating diet. Continue stool softeners.  Renal: monitor urine output, supplement electrolytes as needed  Vasc: HSQ for DVT prophylaxis  Heme: Stable H/H  ID: Started levofloxacin. Pt is stable  Therapy: OOB/ambulate. Aggressive chest PT  Tubes: Monitor Chest tube output  Discussed with Cardiothoracic Team at AM rounds.    Gt Berkowitz, PGY-1  Thoracic Surgery  [id number]

## 2020-08-29 NOTE — PROGRESS NOTE ADULT - SUBJECTIVE AND OBJECTIVE BOX
DOMENIC HAN                     [id number]    HPI:  85  year old male presents to presurgical testing with diagnosis of malignant neoplasm of unspecified part of left bronchus or lung scheduled for left video assisted thoracoscopy robotic assisted left upper lobectomy mediastinal lymph node dissection. 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 (18 Aug 2020 14:08)      Procedure: Flexible bronchoscopy, left robotic assisted VATS, pneumolysis, left upper lobectomy, partial pleurectomy and mediastinal lymph node dissection 8/25/20    Issues:  Hemoptysis  Lung cancer  Left lung atelectasis  Postop pain  HTN  HLD  BPH  Hx of TB               Home Medications:  aspirin 81 mg oral tablet: 1 tab(s) orally once a day (25 Aug 2020 13:41)  atenolol 100 mg oral tablet: 1 tab(s) orally once a day (25 Aug 2020 13:41)  omeprazole 40 mg oral delayed release capsule: 1 cap(s) orally once a day (25 Aug 2020 13:41)  pravastatin 40 mg oral tablet: 1 tab(s) orally once a day (25 Aug 2020 13:41)  tamsulosin 0.4 mg oral capsule: 1 cap(s) orally once a day (25 Aug 2020 13:41)  timolol hemihydrate 0.5% ophthalmic solution: 1 drop(s) to each affected eye 2 times a day (25 Aug 2020 13:41)  Vitamin B Complex 100: 1 tab(s) orally once a day (25 Aug 2020 13:41)        PAST MEDICAL & SURGICAL HISTORY:  Glaucoma  Tuberculosis  Dementia  Arthritis  History of BPH  Malignant neoplasm of unspecified part of left bronchus or lung  Hyperlipidemia  Hypertension  History of lung biopsy  Pacemaker: [employer]  model [id number]  serial [id number]  date 7/1/2014  S/P total knee replacement, left            VITAL SIGNS:  Vital Signs Last 24 Hrs  T(C): 36.4 (29 Aug 2020 13:00), Max: 37 (28 Aug 2020 20:50)  T(F): 97.6 (29 Aug 2020 13:00), Max: 98.6 (28 Aug 2020 20:50)  HR: 65 (29 Aug 2020 14:30) (63 - 71)  BP: 135/70 (29 Aug 2020 14:30) (109/59 - 139/76)  BP(mean): 85 (29 Aug 2020 14:30) (85 - 92)  RR: 22 (29 Aug 2020 14:30) (15 - 22)  SpO2: 99% (29 Aug 2020 14:30) (94% - 100%)    I/Os:   I&O's Detail    28 Aug 2020 07:01  -  29 Aug 2020 07:00  --------------------------------------------------------  IN:    lactated ringers.: 30 mL  Total IN: 30 mL    OUT:    Chest Tube: 110 mL    Chest Tube: 170 mL    Voided: 550 mL  Total OUT: 830 mL    Total NET: -800 mL      29 Aug 2020 07:01  -  29 Aug 2020 15:38  --------------------------------------------------------  IN:  Total IN: 0 mL    OUT:    Chest Tube: 60 mL    Voided: 50 mL  Total OUT: 110 mL    Total NET: -110 mL          CAPILLARY BLOOD GLUCOSE          =======================MEDICATIONS===================  MEDICATIONS  (STANDING):  acetaminophen  IVPB .. 1000 milliGRAM(s) IV Intermittent once  acetaminophen  IVPB .. 1000 milliGRAM(s) IV Intermittent once  acetaminophen  IVPB .. 1000 milliGRAM(s) IV Intermittent once  albuterol/ipratropium for Nebulization 3 milliLiter(s) Nebulizer every 6 hours  ATENolol  Tablet 25 milliGRAM(s) Oral daily  atorvastatin 10 milliGRAM(s) Oral at bedtime  ketamine Injectable 15 milliGRAM(s) IV Push once  levoFLOXacin IVPB      pantoprazole    Tablet 40 milliGRAM(s) Oral before breakfast  polyethylene glycol 3350 17 Gram(s) Oral daily  senna 2 Tablet(s) Oral at bedtime  sodium chloride 3%  Inhalation 4 milliLiter(s) Inhalation every 6 hours  tamsulosin 0.4 milliGRAM(s) Oral at bedtime  timolol 0.5% Solution 1 Drop(s) Both EYES every 12 hours    MEDICATIONS  (PRN):  naloxone Injectable 0.1 milliGRAM(s) IV Push every 3 minutes PRN For ANY of the following changes in patient status:  A. RR LESS THAN 10 breaths per minute, B. Oxygen saturation LESS THAN 90%, C. Sedation score of 6  ondansetron Injectable 4 milliGRAM(s) IV Push every 6 hours PRN Nausea  oxyCODONE    IR 5 milliGRAM(s) Oral every 6 hours PRN Severe Pain (7 - 10)      =======================VENTILATOR SETTINGS===================  Stand By    PHYSICAL EXAM============================  General:                         Awake, alert, Acute Hemoptysis , anxious  Neuro:                            Moving all extremities to commands.   Respiratory:	Air entry fair and  bilateral conducted sounds                                  Decreased BS on left  CV:		Regular rate and rhythm. Normal S1/S2                                          Distal pulses present.  Abdomen:	                     Soft, non-distended. Bowel sounds present   Skin:		No rash.  Extremities:	Warm, no cyanosis or edema.  Palpable pulses    ============================LABS=========================                        13.0   8.74  )-----------( 147      ( 29 Aug 2020 05:30 )             37.8     08-29    133<L>  |  100  |  20  ----------------------------<  123<H>  4.0   |  22  |  0.76    Ca    7.9<L>      29 Aug 2020 05:30  Phos  2.5     08-29  Mg     2.3     08-29    TPro  6.0  /  Alb  3.2<L>  /  TBili  1.7<H>  /  DBili  x   /  AST  15  /  ALT  17  /  AlkPhos  50  08-28    LIVER FUNCTIONS - ( 28 Aug 2020 06:27 )  Alb: 3.2 g/dL / Pro: 6.0 g/dL / ALK PHOS: 50 u/L / ALT: 17 u/L / AST: 15 u/L / GGT: x           PT/INR - ( 29 Aug 2020 14:45 )   PT: 13.7 SEC;   INR: 1.21          PTT - ( 29 Aug 2020 14:45 )  PTT:27.1 SEC        ============================IMAGING STUDIES=========================  < from: Xray Chest 1 View- PORTABLE-Routine (08.29.20 @ 08:13) >  August 28:    4:32 PM:  Since the last exam, the upper left-sided chest tube has been removed. No complicating pneumothorax. Lower left-sided chest tube remains. Right lung is clear. Subcutaneous emphysema in the left side of the chest.    10:45 PM:  Increasing haziness in the left hemithorax may represent developing postop effusion. Visible left upper lobe is clear. Right lung remains clear as well. No pneumothorax.    August 29:    8:01 AM:  Left-sided chest tube remains. Loss of volume in this lung post thoracotomy with midlung haziness may represent layering effusion/atelectasis. Right lung remains clear. Heart size is stable and no pneumothorax.    Left-sided subcutaneous emphysema in the chest wall and soft tissues of the neck.      COMPARISON:  August 28 at 3:31 AM      IMPRESSION:  Follow-up studies post left upper lobectomy post removal of one of the 2 chest tubes with postop midlung atelectasis and loculated effusion with clear right lung. No pneumothorax.    A?P:  85yMale s/p Flexible bronchoscopy, left robotic assisted VATS, pneumolysis, left upper lobectomy, partial pleurectomy and mediastinal lymph node dissection 8/25/20 , experiencing  pain with deep breathing.                             Neuro:                                         Pain control with   Tylenol / Lidoderm                            Cardiovascular:                                          Continue hemodynamic monitoring.    HTN: Restart Atenolol    HLD: On Lipitor                             Respiratory:                                         Acute hemoptysis, bright red blood, Airway monitoring, In ICU                                  Urgent bedside FB, Dr. Echevarria at bedside                                CTA chest      Atelectasis:  Continue 3% Saline inhalations / pulmozyme                                                                                Encourage incentive spirometry                                          Monitor chest tube output                                         Chest tube to water seal, has air leak.                                                                  Hx of TB: Continue airborne isolation                            GI                                        NPO                                         Continue Zofran / Reglan for nausea - PRN	                                                                 Renal:                                         Continue LR                                           Monitor I/Os and electrolytes                                         BPH: On Flomax                                                 Hem/ Onc:                                                                                  Monitor chest tube output &  signs of bleeding.                                          CBC now &  in AM                           Infectious disease:      Hx of TB: Continue airborne isolation                                          Monitor for fever / leukocytosis.                                          All surgical incision / chest tube  sites look clean                            Endocrine                                             Continue Accu-Checks with coverage    Pt is on SQ Heparin and Venodyne boots for DVT prophylaxis.     Pertinent clinical, laboratory, radiographic, hemodynamic, echocardiographic, respiratory data, microbiologic data and chart were reviewed and analyzed frequently throughout the course of the day and night  Patient seen, examined and plan discussed with CT Surgeon Dr. Streeter  / CTICU team.      I spent 45 minutes at bedside providing critical care services from 10am to 11pm    Juan Bautista DO, FACEP

## 2020-08-29 NOTE — CHART NOTE - NSCHARTNOTEFT_GEN_A_CORE
Pt noted to have increase hemoptysis with fatigue, nausea and   worsening Breath sounds throughout right lung. No improvement  with chest PT and NEb treatment.   HR 60s, O2 sat 93% on RA.  Dr. Echevarria made aware of the above.  Pt transferred to SICU for bedside Bronch and   observation. Pt noted to have increase hemoptysis with fatigue, nausea and   worsening Breath sounds throughout left lung. No improvement  with chest PT and NEb treatment.   HR 60s, O2 sat 93% on RA. B/p WNL  Dr. Echevarria made aware of the above.  Pt transferred to SICU for bedside Bronch and   observation.

## 2020-08-30 ENCOUNTER — TRANSCRIPTION ENCOUNTER (OUTPATIENT)
Age: 85
End: 2020-08-30

## 2020-08-30 LAB
HCT VFR BLD CALC: 33.6 % — LOW (ref 39–50)
HGB BLD-MCNC: 11.4 G/DL — LOW (ref 13–17)
MCHC RBC-ENTMCNC: 31 PG — SIGNIFICANT CHANGE UP (ref 27–34)
MCHC RBC-ENTMCNC: 33.9 % — SIGNIFICANT CHANGE UP (ref 32–36)
MCV RBC AUTO: 91.3 FL — SIGNIFICANT CHANGE UP (ref 80–100)
NRBC # FLD: 0 K/UL — SIGNIFICANT CHANGE UP (ref 0–0)
PLATELET # BLD AUTO: 159 K/UL — SIGNIFICANT CHANGE UP (ref 150–400)
PMV BLD: 9.9 FL — SIGNIFICANT CHANGE UP (ref 7–13)
RBC # BLD: 3.68 M/UL — LOW (ref 4.2–5.8)
RBC # FLD: 12.1 % — SIGNIFICANT CHANGE UP (ref 10.3–14.5)
WBC # BLD: 8.26 K/UL — SIGNIFICANT CHANGE UP (ref 3.8–10.5)
WBC # FLD AUTO: 8.26 K/UL — SIGNIFICANT CHANGE UP (ref 3.8–10.5)

## 2020-08-30 PROCEDURE — 99233 SBSQ HOSP IP/OBS HIGH 50: CPT

## 2020-08-30 PROCEDURE — 71045 X-RAY EXAM CHEST 1 VIEW: CPT | Mod: 26

## 2020-08-30 RX ORDER — OXYCODONE HYDROCHLORIDE 5 MG/1
2.5 TABLET ORAL EVERY 4 HOURS
Refills: 0 | Status: DISCONTINUED | OUTPATIENT
Start: 2020-08-30 | End: 2020-08-31

## 2020-08-30 RX ORDER — OXYCODONE AND ACETAMINOPHEN 5; 325 MG/1; MG/1
0.5 TABLET ORAL EVERY 4 HOURS
Refills: 0 | Status: DISCONTINUED | OUTPATIENT
Start: 2020-08-30 | End: 2020-08-30

## 2020-08-30 RX ORDER — ACETAMINOPHEN 500 MG
650 TABLET ORAL EVERY 6 HOURS
Refills: 0 | Status: DISCONTINUED | OUTPATIENT
Start: 2020-08-30 | End: 2020-09-06

## 2020-08-30 RX ADMIN — SODIUM CHLORIDE 30 MILLILITER(S): 9 INJECTION, SOLUTION INTRAVENOUS at 17:10

## 2020-08-30 RX ADMIN — PIPERACILLIN AND TAZOBACTAM 25 GRAM(S): 4; .5 INJECTION, POWDER, LYOPHILIZED, FOR SOLUTION INTRAVENOUS at 22:19

## 2020-08-30 RX ADMIN — ATENOLOL 25 MILLIGRAM(S): 25 TABLET ORAL at 05:15

## 2020-08-30 RX ADMIN — TAMSULOSIN HYDROCHLORIDE 0.4 MILLIGRAM(S): 0.4 CAPSULE ORAL at 22:19

## 2020-08-30 RX ADMIN — SODIUM CHLORIDE 30 MILLILITER(S): 9 INJECTION, SOLUTION INTRAVENOUS at 11:12

## 2020-08-30 RX ADMIN — Medication 250 MILLIGRAM(S): at 05:14

## 2020-08-30 RX ADMIN — Medication 1 DROP(S): at 17:10

## 2020-08-30 RX ADMIN — SENNA PLUS 2 TABLET(S): 8.6 TABLET ORAL at 22:19

## 2020-08-30 RX ADMIN — PIPERACILLIN AND TAZOBACTAM 25 GRAM(S): 4; .5 INJECTION, POWDER, LYOPHILIZED, FOR SOLUTION INTRAVENOUS at 14:35

## 2020-08-30 RX ADMIN — PIPERACILLIN AND TAZOBACTAM 25 GRAM(S): 4; .5 INJECTION, POWDER, LYOPHILIZED, FOR SOLUTION INTRAVENOUS at 05:15

## 2020-08-30 RX ADMIN — ATORVASTATIN CALCIUM 10 MILLIGRAM(S): 80 TABLET, FILM COATED ORAL at 22:19

## 2020-08-30 RX ADMIN — Medication 1 DROP(S): at 05:15

## 2020-08-30 RX ADMIN — POLYETHYLENE GLYCOL 3350 17 GRAM(S): 17 POWDER, FOR SOLUTION ORAL at 11:12

## 2020-08-30 RX ADMIN — Medication 250 MILLIGRAM(S): at 17:09

## 2020-08-30 RX ADMIN — PANTOPRAZOLE SODIUM 40 MILLIGRAM(S): 20 TABLET, DELAYED RELEASE ORAL at 05:15

## 2020-08-30 NOTE — DISCHARGE NOTE PROVIDER - HOSPITAL COURSE
This 85 year old male was diagnosed with a malignant left lung neoplasm and underwent a FB, left robotic VATS, pneumolysis, left upper lobectomy, partial pleurectomy, and MLND on 8/25/20. Pt had a h/o untreated TB and developed a cough in 2019.  A CT scan as part of the work up showed an abnormal nodule and a subsequent biopsy of the nodule revealed a malignancy. Post-op he had some hemoptysis but a bronvj on 8/26 showed only minimal bloody secretions.  One tune was removed on 8/28.  On 8/29 he had some more hemoptysis and a bronch showed some bleeding in the left basilar segment.  A CT scan showed the blood as well.  The remaining chest tube was removed and the pt was started on antibiotics before transferring him back to  and then discharging him home. This 85 year old male was diagnosed with a malignant left lung neoplasm and underwent a FB, left robotic VATS, pneumolysis, left upper lobectomy, partial pleurectomy, and MLND on 8/25/20. Pt had a h/o untreated TB and developed a cough in 2019.  A CT scan as part of the work up showed an abnormal nodule and a subsequent biopsy of the nodule revealed a malignancy. Post-op he had some hemoptysis but a bronch on 8/26 showed only minimal bloody secretions.  One tune was removed on 8/28.  On 8/29 he had some more hemoptysis and a bronch showed some bleeding in the left basilar segment.  A CT scan showed the blood as well.  The remaining chest tube was removed and the pt was started on antibiotics before transferring him back to .  However, he experienced hemoptysis again and was taken to the OR for a reop FB, L VATS, Evacuation of hematoma on 8/31/20.  On 9/1 he had a had a bedside bronch, BAL and was extubated.  As cultures were negative, antibiotics were stopped on 9/3.  Diarrhea that he had been having improved after that and the pt was for discharge home. This 85 year old male was diagnosed with a malignant left lung neoplasm and underwent a FB, left robotic VATS, pneumolysis, left upper lobectomy, partial pleurectomy, and MLND on 8/25/20. Pt had a h/o untreated TB and developed a cough in 2019.  A CT scan as part of the work up showed an abnormal nodule and a subsequent biopsy of the nodule revealed a malignancy. Post-op he had some hemoptysis but a bronch on 8/26 showed only minimal bloody secretions.  One tune was removed on 8/28.  On 8/29 he had some more hemoptysis and a bronch showed some bleeding in the left basilar segment.  A CT scan showed the blood as well.  The remaining chest tube was removed and the pt was started on antibiotics before transferring him back to .  However, he experienced hemoptysis again and was taken to the OR for a reop FB, L VATS, Evacuation of hematoma on 8/31/20.  On 9/1 he had a had a bedside bronch, BAL and was extubated.  As cultures were negative, antibiotics were stopped on 9/3.  Diarrhea that he had been having improved after that and the pt was for discharge home.      CT removed yesterday. FU CXR stable. Today pt feels well. No CP or SOB. Ambulating frequently. Left VATS site c/d/i. Cleared for d/c to home by Dr. Lr    Vital Signs Last 24 Hrs    T(C): 36.7 (06 Sep 2020 08:45), Max: 37.1 (05 Sep 2020 12:00)    T(F): 98 (06 Sep 2020 08:45), Max: 98.7 (05 Sep 2020 12:00)    HR: 64 (06 Sep 2020 08:45) (58 - 65)    BP: 128/66 (06 Sep 2020 08:45) (122/60 - 150/64)    BP(mean): --    RR: 16 (06 Sep 2020 08:45) (16 - 18)    SpO2: 96% (06 Sep 2020 08:45) (94% - 96%)

## 2020-08-30 NOTE — DISCHARGE NOTE PROVIDER - NSDCFUADDINST_GEN_ALL_CORE_FT
Keep wounds clean with soap and water and allow to air dry.  Watch for pus, increasing redness, fevers, or difficulty breathing.  If noted, call Dr Streeter or go to the ER. Keep wounds clean with soap and water and allow to air dry.  Watch for pus, increasing redness, fevers, or difficulty breathing.  If noted, call Dr Streeter or go to the ER.  You may remove all dressings from your chest and start to shower. Leave incisions uncovered. Sutures will be removed in office.   Walk 4-5 x per day. You may climb stairs. Continue to use incentive spirometer.

## 2020-08-30 NOTE — DISCHARGE NOTE PROVIDER - CARE PROVIDER_API CALL
Sebas Streeter  SURGERY  [address] [place] [address] [place] [address]  Phone: [phone]  Fax: [phone]  Established Patient  Follow Up Time:

## 2020-08-30 NOTE — DISCHARGE NOTE PROVIDER - NSDCCPTREATMENT_GEN_ALL_CORE_FT
PRINCIPAL PROCEDURE  Procedure: VATS, with partial pleurectomy  Findings and Treatment:       SECONDARY PROCEDURE  Procedure: Bronchoscopy, flexible  Findings and Treatment:     Procedure: Robot-assisted mediastinal lymphadenectomy  Findings and Treatment:     Procedure: Thoracoscopic pneumolysis  Findings and Treatment:

## 2020-08-30 NOTE — DISCHARGE NOTE PROVIDER - NSDCFUADDAPPT_GEN_ALL_CORE_FT
See Dr Streeter in two weeks.  Call for an appointment. See Dr Streeter in two weeks.  Call for an appointment.[phone]  Have a chest xray done prior to your apt and then bring those images with you.

## 2020-08-30 NOTE — DISCHARGE NOTE PROVIDER - NSDCACTIVITY_GEN_ALL_CORE
Sex allowed/Do not make important decisions/No heavy lifting/straining/Walking - Outdoors allowed/Do not drive or operate machinery/Stairs allowed/Showering allowed/Walking - Indoors allowed

## 2020-08-30 NOTE — DISCHARGE NOTE PROVIDER - NSDCMRMEDTOKEN_GEN_ALL_CORE_FT
aspirin 81 mg oral tablet: 1 tab(s) orally once a day  atenolol 100 mg oral tablet: 1 tab(s) orally once a day  omeprazole 40 mg oral delayed release capsule: 1 cap(s) orally once a day  pravastatin 40 mg oral tablet: 1 tab(s) orally once a day  tamsulosin 0.4 mg oral capsule: 1 cap(s) orally once a day  timolol hemihydrate 0.5% ophthalmic solution: 1 drop(s) to each affected eye 2 times a day  Vitamin B Complex 100: 1 tab(s) orally once a day acetaminophen 325 mg oral tablet: 2 tab(s) orally every 6 hours, As needed, Mild Pain (1 - 3)  aspirin 81 mg oral tablet: 1 tab(s) orally once a day  atenolol 100 mg oral tablet: 1 tab(s) orally once a day  omeprazole 40 mg oral delayed release capsule: 1 cap(s) orally once a day  pravastatin 40 mg oral tablet: 1 tab(s) orally once a day  tamsulosin 0.4 mg oral capsule: 1 cap(s) orally once a day  timolol hemihydrate 0.5% ophthalmic solution: 1 drop(s) to each affected eye 2 times a day  Vitamin B Complex 100: 1 tab(s) orally once a day

## 2020-08-30 NOTE — DISCHARGE NOTE PROVIDER - NSDCCPCAREPLAN_GEN_ALL_CORE_FT
PRINCIPAL DISCHARGE DIAGNOSIS  Diagnosis: Malignant neoplasm of unspecified part of left bronchus or lung  Assessment and Plan of Treatment:

## 2020-08-30 NOTE — PROGRESS NOTE ADULT - SUBJECTIVE AND OBJECTIVE BOX
PROCEDURE: left robotic assisted VATS, pneumolysis, left upper lobectomy, partial pleurectomy and mediastinal lymph node dissection  25-Aug-2020           ISSUES:   Lung cancer  Post op pain  Chest tube in place  BPH  HLD  HTN  Dementia  Hx of tuberculosis (unclear history)  Glaucoma  s/p Pacemaker [employer], model [id number], serial [id number], date 7/1/2014    INTERVAL EVENTS:   L lung atelectasis. Bronchoscopy yesterday.  On vanc and zosyn  No hemoptysis since 8/30 2am minimal.         HISTORY:   Patient reports moderate pain at chest wall incision sites which is worse with coughing and deep breathing without associated fever or dyspnea. Pain is improved with use of pain meds.     PHYSICAL EXAM:   Gen: Comfortable, No acute distress  Eyes: Sclera white, Conjunctiva normal, Eyelids normal, Pupils symmetrical   ENT: Mucous membranes moist,  ,  ,    Neck: Trachea midline,  ,  ,  ,  ,    CV: Rate regular, Rhythm regular,  ,  ,    Resp: Breath sounds clear, No accessory muscles use  Abd: Soft, Non-distended, Non-tender, Bowel sounds normal,  ,    Skin: Warm, No peripheral edema of lower extremities,  ,    : No merchant  Neuro: Moving all 4 extremities,    Psych: A&Ox3      ASSESSMENT AND PLAN:     NEURO:  Post-operative Pain - Pain control with oxycodone and Tylenol IV PRN.       RESPIRATORY:  Hypoxia - Wean nasal cannula for goal O2sat above 92. Obtain CXR. Incentive spirometry. Chest PT and frequent suctioning. Continue bronchodilators. OOB to chair & ambulate w/ assistance. Continuous pulse oximetry for support & to prevent decompensation.     Hemoptysis - improved. monitor volume of hemoptysis. Continue vanc and zosyn. Monitor zosyn levels.        CARDIOVASCULAR:  Hemodynamically stable - Not on pressors. Continue hemodynamic monitoring.  HTN - stable. Continue home antihypertensives for now.       RENAL:  Stable – LR IVF 30mL/hr. Monitor IOs and electrolytes.   BPH - Stable. Flomax qday      GASTROINTESTINAL:  GI prophylaxis with pantoprazole  Zofran and Reglan IV PRN for nausea  Regular consistency diet       HEMATOLOGIC:  No signs of active bleeding. Monitor Hgb in CBC in AM  DVT prophylaxis with heparin subQ and SCDs.         INFECTIOUS DISEASE:  All surgical sites appear clean. Will monitor for fever and leukocytosis.   Antibiotics for hemoptysis.      ENDOCRINE:  Stable – Monitor glucose fingersticks for goal 120-180.            Pertinent clinical, laboratory, radiographic, hemodynamic, echocardiographic, respiratory data, microbiologic data and chart were reviewed by myself and analyzed frequently throughout the course of the day and night by myself.    Plan discussed at length with the CTICU staff and Attending CT Surgeon.     _________________________  VITAL SIGNS:  Vital Signs Last 24 Hrs  T(C): 36.7 (30 Aug 2020 08:00), Max: 36.8 (30 Aug 2020 04:00)  T(F): 98.1 (30 Aug 2020 08:00), Max: 98.3 (30 Aug 2020 04:00)  HR: 65 (30 Aug 2020 10:05) (65 - 71)  BP: 118/66 (30 Aug 2020 10:05) (91/48 - 139/76)  BP(mean): 78 (30 Aug 2020 10:05) (59 - 92)  RR: 21 (30 Aug 2020 10:05) (15 - 27)  SpO2: 98% (30 Aug 2020 10:05) (95% - 100%)  I/Os:   I&O's Detail    29 Aug 2020 07:01  -  30 Aug 2020 07:00  --------------------------------------------------------  IN:    IV PiggyBack: 900 mL    lactated ringers.: 1200 mL    Oral Fluid: 30 mL  Total IN: 2130 mL    OUT:    Chest Tube: 110 mL    Voided: 1025 mL  Total OUT: 1135 mL    Total NET: 995 mL              MEDICATIONS:  MEDICATIONS  (STANDING):  acetaminophen  IVPB .. 1000 milliGRAM(s) IV Intermittent once  acetaminophen  IVPB .. 1000 milliGRAM(s) IV Intermittent once  albuterol/ipratropium for Nebulization 3 milliLiter(s) Nebulizer every 6 hours  ATENolol  Tablet 25 milliGRAM(s) Oral daily  atorvastatin 10 milliGRAM(s) Oral at bedtime  lactated ringers. 1000 milliLiter(s) (30 mL/Hr) IV Continuous <Continuous>  pantoprazole    Tablet 40 milliGRAM(s) Oral before breakfast  piperacillin/tazobactam IVPB.. 3.375 Gram(s) IV Intermittent every 8 hours  polyethylene glycol 3350 17 Gram(s) Oral daily  senna 2 Tablet(s) Oral at bedtime  sodium chloride 3%  Inhalation 4 milliLiter(s) Inhalation every 6 hours  tamsulosin 0.4 milliGRAM(s) Oral at bedtime  timolol 0.5% Solution 1 Drop(s) Both EYES every 12 hours  vancomycin  IVPB 1000 milliGRAM(s) IV Intermittent every 12 hours  vancomycin  IVPB        MEDICATIONS  (PRN):  naloxone Injectable 0.1 milliGRAM(s) IV Push every 3 minutes PRN For ANY of the following changes in patient status:  A. RR LESS THAN 10 breaths per minute, B. Oxygen saturation LESS THAN 90%, C. Sedation score of 6  ondansetron Injectable 4 milliGRAM(s) IV Push every 6 hours PRN Nausea  oxyCODONE    IR 5 milliGRAM(s) Oral every 6 hours PRN Severe Pain (7 - 10)      LABS:                        11.4   8.26  )-----------( 159      ( 30 Aug 2020 05:40 )             33.6     08-29    133<L>  |  100  |  20  ----------------------------<  123<H>  4.0   |  22  |  0.76    Ca    7.9<L>      29 Aug 2020 05:30  Phos  2.5     08-29  Mg     2.3     08-29        PT/INR - ( 29 Aug 2020 14:45 )   PT: 13.7 SEC;   INR: 1.21          PTT - ( 29 Aug 2020 14:45 )  PTT:27.1 SEC      _________________________          Patient's status was discussed with patient at bedside.

## 2020-08-31 ENCOUNTER — TRANSCRIPTION ENCOUNTER (OUTPATIENT)
Age: 85
End: 2020-08-31

## 2020-08-31 LAB
ALBUMIN SERPL ELPH-MCNC: 3 G/DL — LOW (ref 3.3–5)
ALBUMIN SERPL ELPH-MCNC: 3.1 G/DL — LOW (ref 3.3–5)
ALP SERPL-CCNC: 61 U/L — SIGNIFICANT CHANGE UP (ref 40–120)
ALP SERPL-CCNC: 67 U/L — SIGNIFICANT CHANGE UP (ref 40–120)
ALT FLD-CCNC: 29 U/L — SIGNIFICANT CHANGE UP (ref 4–41)
ALT FLD-CCNC: 30 U/L — SIGNIFICANT CHANGE UP (ref 4–41)
ANION GAP SERPL CALC-SCNC: 10 MMO/L — SIGNIFICANT CHANGE UP (ref 7–14)
ANION GAP SERPL CALC-SCNC: 12 MMO/L — SIGNIFICANT CHANGE UP (ref 7–14)
APTT BLD: 26.3 SEC — LOW (ref 27–36.3)
AST SERPL-CCNC: 21 U/L — SIGNIFICANT CHANGE UP (ref 4–40)
AST SERPL-CCNC: 23 U/L — SIGNIFICANT CHANGE UP (ref 4–40)
BASE EXCESS BLDA CALC-SCNC: -0.8 MMOL/L — SIGNIFICANT CHANGE UP
BILIRUB SERPL-MCNC: 1.6 MG/DL — HIGH (ref 0.2–1.2)
BILIRUB SERPL-MCNC: 1.6 MG/DL — HIGH (ref 0.2–1.2)
BLD GP AB SCN SERPL QL: NEGATIVE — SIGNIFICANT CHANGE UP
BUN SERPL-MCNC: 16 MG/DL — SIGNIFICANT CHANGE UP (ref 7–23)
BUN SERPL-MCNC: 16 MG/DL — SIGNIFICANT CHANGE UP (ref 7–23)
CALCIUM SERPL-MCNC: 7.5 MG/DL — LOW (ref 8.4–10.5)
CALCIUM SERPL-MCNC: 7.5 MG/DL — LOW (ref 8.4–10.5)
CHLORIDE BLDA-SCNC: 103 MMOL/L — SIGNIFICANT CHANGE UP (ref 96–108)
CHLORIDE SERPL-SCNC: 100 MMOL/L — SIGNIFICANT CHANGE UP (ref 98–107)
CHLORIDE SERPL-SCNC: 97 MMOL/L — LOW (ref 98–107)
CO2 SERPL-SCNC: 23 MMOL/L — SIGNIFICANT CHANGE UP (ref 22–31)
CO2 SERPL-SCNC: 24 MMOL/L — SIGNIFICANT CHANGE UP (ref 22–31)
CREAT SERPL-MCNC: 0.71 MG/DL — SIGNIFICANT CHANGE UP (ref 0.5–1.3)
CREAT SERPL-MCNC: 0.79 MG/DL — SIGNIFICANT CHANGE UP (ref 0.5–1.3)
GLUCOSE BLDA-MCNC: 152 MG/DL — HIGH (ref 70–99)
GLUCOSE SERPL-MCNC: 154 MG/DL — HIGH (ref 70–99)
GLUCOSE SERPL-MCNC: 178 MG/DL — HIGH (ref 70–99)
GRAM STN FLD: SIGNIFICANT CHANGE UP
HCO3 BLDA-SCNC: 23 MMOL/L — SIGNIFICANT CHANGE UP (ref 22–26)
HCT VFR BLD CALC: 34.1 % — LOW (ref 39–50)
HCT VFR BLD CALC: 34.8 % — LOW (ref 39–50)
HCT VFR BLD CALC: 37.1 % — LOW (ref 39–50)
HCT VFR BLDA CALC: 39 % — SIGNIFICANT CHANGE UP (ref 39–51)
HGB BLD-MCNC: 11.6 G/DL — LOW (ref 13–17)
HGB BLD-MCNC: 12 G/DL — LOW (ref 13–17)
HGB BLD-MCNC: 12.5 G/DL — LOW (ref 13–17)
HGB BLDA-MCNC: 12.7 G/DL — LOW (ref 13–17)
INR BLD: 1.15 — SIGNIFICANT CHANGE UP (ref 0.88–1.16)
LACTATE BLDA-SCNC: 1.2 MMOL/L — SIGNIFICANT CHANGE UP (ref 0.5–2)
MAGNESIUM SERPL-MCNC: 2.2 MG/DL — SIGNIFICANT CHANGE UP (ref 1.6–2.6)
MAGNESIUM SERPL-MCNC: 2.2 MG/DL — SIGNIFICANT CHANGE UP (ref 1.6–2.6)
MCHC RBC-ENTMCNC: 30.9 PG — SIGNIFICANT CHANGE UP (ref 27–34)
MCHC RBC-ENTMCNC: 31.2 PG — SIGNIFICANT CHANGE UP (ref 27–34)
MCHC RBC-ENTMCNC: 32.3 PG — SIGNIFICANT CHANGE UP (ref 27–34)
MCHC RBC-ENTMCNC: 33.3 % — SIGNIFICANT CHANGE UP (ref 32–36)
MCHC RBC-ENTMCNC: 33.7 % — SIGNIFICANT CHANGE UP (ref 32–36)
MCHC RBC-ENTMCNC: 35.2 % — SIGNIFICANT CHANGE UP (ref 32–36)
MCV RBC AUTO: 91.7 FL — SIGNIFICANT CHANGE UP (ref 80–100)
MCV RBC AUTO: 92.5 FL — SIGNIFICANT CHANGE UP (ref 80–100)
MCV RBC AUTO: 92.6 FL — SIGNIFICANT CHANGE UP (ref 80–100)
NRBC # FLD: 0 K/UL — SIGNIFICANT CHANGE UP (ref 0–0)
PCO2 BLDA: 46 MMHG — SIGNIFICANT CHANGE UP (ref 35–48)
PH BLDA: 7.34 PH — LOW (ref 7.35–7.45)
PHOSPHATE SERPL-MCNC: 2.3 MG/DL — LOW (ref 2.5–4.5)
PLATELET # BLD AUTO: 178 K/UL — SIGNIFICANT CHANGE UP (ref 150–400)
PLATELET # BLD AUTO: 185 K/UL — SIGNIFICANT CHANGE UP (ref 150–400)
PLATELET # BLD AUTO: 194 K/UL — SIGNIFICANT CHANGE UP (ref 150–400)
PMV BLD: 9.7 FL — SIGNIFICANT CHANGE UP (ref 7–13)
PMV BLD: 9.8 FL — SIGNIFICANT CHANGE UP (ref 7–13)
PMV BLD: 9.8 FL — SIGNIFICANT CHANGE UP (ref 7–13)
PO2 BLDA: 120 MMHG — HIGH (ref 83–108)
POTASSIUM BLDA-SCNC: 3.8 MMOL/L — SIGNIFICANT CHANGE UP (ref 3.4–4.5)
POTASSIUM SERPL-MCNC: 3.8 MMOL/L — SIGNIFICANT CHANGE UP (ref 3.5–5.3)
POTASSIUM SERPL-MCNC: 4.2 MMOL/L — SIGNIFICANT CHANGE UP (ref 3.5–5.3)
POTASSIUM SERPL-SCNC: 3.8 MMOL/L — SIGNIFICANT CHANGE UP (ref 3.5–5.3)
POTASSIUM SERPL-SCNC: 4.2 MMOL/L — SIGNIFICANT CHANGE UP (ref 3.5–5.3)
PROT SERPL-MCNC: 5.6 G/DL — LOW (ref 6–8.3)
PROT SERPL-MCNC: 5.8 G/DL — LOW (ref 6–8.3)
PROTHROM AB SERPL-ACNC: 13.1 SEC — SIGNIFICANT CHANGE UP (ref 10.6–13.6)
RBC # BLD: 3.72 M/UL — LOW (ref 4.2–5.8)
RBC # BLD: 3.76 M/UL — LOW (ref 4.2–5.8)
RBC # BLD: 4.01 M/UL — LOW (ref 4.2–5.8)
RBC # FLD: 12 % — SIGNIFICANT CHANGE UP (ref 10.3–14.5)
RBC # FLD: 12 % — SIGNIFICANT CHANGE UP (ref 10.3–14.5)
RBC # FLD: 12.2 % — SIGNIFICANT CHANGE UP (ref 10.3–14.5)
RH IG SCN BLD-IMP: POSITIVE — SIGNIFICANT CHANGE UP
SAO2 % BLDA: 98.4 % — SIGNIFICANT CHANGE UP (ref 95–99)
SODIUM BLDA-SCNC: 127 MMOL/L — LOW (ref 136–146)
SODIUM SERPL-SCNC: 131 MMOL/L — LOW (ref 135–145)
SODIUM SERPL-SCNC: 135 MMOL/L — SIGNIFICANT CHANGE UP (ref 135–145)
SPECIMEN SOURCE: SIGNIFICANT CHANGE UP
VANCOMYCIN TROUGH SERPL-MCNC: 43.1 UG/ML — CRITICAL HIGH (ref 10–20)
WBC # BLD: 11.41 K/UL — HIGH (ref 3.8–10.5)
WBC # BLD: 7.42 K/UL — SIGNIFICANT CHANGE UP (ref 3.8–10.5)
WBC # BLD: 9.47 K/UL — SIGNIFICANT CHANGE UP (ref 3.8–10.5)
WBC # FLD AUTO: 11.41 K/UL — HIGH (ref 3.8–10.5)
WBC # FLD AUTO: 7.42 K/UL — SIGNIFICANT CHANGE UP (ref 3.8–10.5)
WBC # FLD AUTO: 9.47 K/UL — SIGNIFICANT CHANGE UP (ref 3.8–10.5)

## 2020-08-31 PROCEDURE — 32653 THORACOSCOPY REMOV FB/FIBRIN: CPT | Mod: 78

## 2020-08-31 PROCEDURE — 99292 CRITICAL CARE ADDL 30 MIN: CPT

## 2020-08-31 PROCEDURE — 71045 X-RAY EXAM CHEST 1 VIEW: CPT | Mod: 26

## 2020-08-31 PROCEDURE — 99291 CRITICAL CARE FIRST HOUR: CPT

## 2020-08-31 PROCEDURE — 32653 THORACOSCOPY REMOV FB/FIBRIN: CPT | Mod: 80,78

## 2020-08-31 PROCEDURE — 32654 THORACOSCOPY CONTRL BLEEDING: CPT | Mod: 78

## 2020-08-31 PROCEDURE — 32654 THORACOSCOPY CONTRL BLEEDING: CPT | Mod: 80,78

## 2020-08-31 PROCEDURE — 31624 DX BRONCHOSCOPE/LAVAGE: CPT | Mod: 78

## 2020-08-31 PROCEDURE — 71045 X-RAY EXAM CHEST 1 VIEW: CPT | Mod: 26,77

## 2020-08-31 RX ORDER — SODIUM CHLORIDE 9 MG/ML
1000 INJECTION, SOLUTION INTRAVENOUS
Refills: 0 | Status: DISCONTINUED | OUTPATIENT
Start: 2020-08-31 | End: 2020-08-31

## 2020-08-31 RX ORDER — SODIUM,POTASSIUM PHOSPHATES 278-250MG
1 POWDER IN PACKET (EA) ORAL ONCE
Refills: 0 | Status: DISCONTINUED | OUTPATIENT
Start: 2020-08-31 | End: 2020-09-02

## 2020-08-31 RX ORDER — FENTANYL CITRATE 50 UG/ML
25 INJECTION INTRAVENOUS
Refills: 0 | Status: DISCONTINUED | OUTPATIENT
Start: 2020-08-31 | End: 2020-09-01

## 2020-08-31 RX ORDER — ACETAMINOPHEN 500 MG
1000 TABLET ORAL ONCE
Refills: 0 | Status: COMPLETED | OUTPATIENT
Start: 2020-08-31 | End: 2020-09-01

## 2020-08-31 RX ORDER — HEPARIN SODIUM 5000 [USP'U]/ML
5000 INJECTION INTRAVENOUS; SUBCUTANEOUS EVERY 12 HOURS
Refills: 0 | Status: DISCONTINUED | OUTPATIENT
Start: 2020-09-01 | End: 2020-09-06

## 2020-08-31 RX ORDER — SODIUM CHLORIDE 9 MG/ML
1000 INJECTION, SOLUTION INTRAVENOUS
Refills: 0 | Status: DISCONTINUED | OUTPATIENT
Start: 2020-08-31 | End: 2020-09-03

## 2020-08-31 RX ORDER — PROPOFOL 10 MG/ML
10 INJECTION, EMULSION INTRAVENOUS
Qty: 500 | Refills: 0 | Status: DISCONTINUED | OUTPATIENT
Start: 2020-08-31 | End: 2020-09-02

## 2020-08-31 RX ORDER — CHLORHEXIDINE GLUCONATE 213 G/1000ML
15 SOLUTION TOPICAL EVERY 12 HOURS
Refills: 0 | Status: DISCONTINUED | OUTPATIENT
Start: 2020-08-31 | End: 2020-09-01

## 2020-08-31 RX ADMIN — ATENOLOL 25 MILLIGRAM(S): 25 TABLET ORAL at 05:35

## 2020-08-31 RX ADMIN — PIPERACILLIN AND TAZOBACTAM 25 GRAM(S): 4; .5 INJECTION, POWDER, LYOPHILIZED, FOR SOLUTION INTRAVENOUS at 05:35

## 2020-08-31 RX ADMIN — Medication 250 MILLIGRAM(S): at 05:38

## 2020-08-31 RX ADMIN — PIPERACILLIN AND TAZOBACTAM 25 GRAM(S): 4; .5 INJECTION, POWDER, LYOPHILIZED, FOR SOLUTION INTRAVENOUS at 21:47

## 2020-08-31 RX ADMIN — PIPERACILLIN AND TAZOBACTAM 25 GRAM(S): 4; .5 INJECTION, POWDER, LYOPHILIZED, FOR SOLUTION INTRAVENOUS at 13:46

## 2020-08-31 RX ADMIN — Medication 1 DROP(S): at 19:32

## 2020-08-31 RX ADMIN — CHLORHEXIDINE GLUCONATE 15 MILLILITER(S): 213 SOLUTION TOPICAL at 18:48

## 2020-08-31 RX ADMIN — PANTOPRAZOLE SODIUM 40 MILLIGRAM(S): 20 TABLET, DELAYED RELEASE ORAL at 05:35

## 2020-08-31 RX ADMIN — Medication 1 DROP(S): at 05:35

## 2020-08-31 RX ADMIN — Medication 63.75 MILLIMOLE(S): at 21:47

## 2020-08-31 NOTE — BRIEF OPERATIVE NOTE - COMMENTS
I was present for this procedure and participated as one of the assistants. I was present for this procedure and participated as one of the assistants.    Adeline - first assistant to Dr. Echevarria

## 2020-08-31 NOTE — BRIEF OPERATIVE NOTE - NSICDXBRIEFPREOP_GEN_ALL_CORE_FT
PRE-OP DIAGNOSIS:  Lung mass 25-Aug-2020 18:38:32  Ventura Griffith
PRE-OP DIAGNOSIS:  Major hemoptysis 31-Aug-2020 17:14:01  Esperanza Collier

## 2020-08-31 NOTE — PROGRESS NOTE ADULT - SUBJECTIVE AND OBJECTIVE BOX
DOMENIC HAN                     [id number]    HPI:  85  year old male presents to presurgical testing with diagnosis of malignant neoplasm of unspecified part of left bronchus or lung scheduled for left video assisted thoracoscopy robotic assisted left upper lobectomy mediastinal lymph node dissection. 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 (18 Aug 2020 14:08)    Procedure: Flexible bronchoscopy, left robotic assisted VATS, pneumolysis, left upper lobectomy, partial pleurectomy and mediastinal lymph node dissection 8/25/20    Issues:  Hemoptysis  Lung cancer  Left lung atelectasis  Postop pain  HTN  HLD  BPH  Hx of TB               Home Medications:  aspirin 81 mg oral tablet: 1 tab(s) orally once a day (25 Aug 2020 13:41)  atenolol 100 mg oral tablet: 1 tab(s) orally once a day (25 Aug 2020 13:41)  omeprazole 40 mg oral delayed release capsule: 1 cap(s) orally once a day (25 Aug 2020 13:41)  pravastatin 40 mg oral tablet: 1 tab(s) orally once a day (25 Aug 2020 13:41)  tamsulosin 0.4 mg oral capsule: 1 cap(s) orally once a day (25 Aug 2020 13:41)  timolol hemihydrate 0.5% ophthalmic solution: 1 drop(s) to each affected eye 2 times a day (25 Aug 2020 13:41)  Vitamin B Complex 100: 1 tab(s) orally once a day (25 Aug 2020 13:41)        PAST MEDICAL & SURGICAL HISTORY:  Glaucoma  Tuberculosis  Dementia  Arthritis  History of BPH  Malignant neoplasm of unspecified part of left bronchus or lung  Hyperlipidemia  Hypertension  History of lung biopsy  Pacemaker: [employer]  model [id number]  serial [id number]  date 7/1/2014  S/P total knee replacement, left            VITAL SIGNS:  Vital Signs Last 24 Hrs  T(C): 36.6 (31 Aug 2020 08:47), Max: 37.1 (30 Aug 2020 20:00)  T(F): 97.9 (31 Aug 2020 08:47), Max: 98.8 (30 Aug 2020 20:00)  HR: 65 (31 Aug 2020 08:47) (65 - 69)  BP: 131/65 (31 Aug 2020 08:47) (119/65 - 131/65)  BP(mean): --  RR: 18 (31 Aug 2020 08:47) (18 - 19)  SpO2: 96% (31 Aug 2020 08:47) (96% - 99%)    I/Os:   I&O's Detail    30 Aug 2020 07:01  -  31 Aug 2020 07:00  --------------------------------------------------------  IN:    lactated ringers.: 285 mL    Oral Fluid: 140 mL  Total IN: 425 mL    OUT:    Voided: 550 mL  Total OUT: 550 mL    Total NET: -125 mL          CAPILLARY BLOOD GLUCOSE          =======================MEDICATIONS===================  MEDICATIONS  (STANDING):  acetaminophen  IVPB .. 1000 milliGRAM(s) IV Intermittent once  acetaminophen  IVPB .. 1000 milliGRAM(s) IV Intermittent once  ATENolol  Tablet 25 milliGRAM(s) Oral daily  atorvastatin 10 milliGRAM(s) Oral at bedtime  pantoprazole    Tablet 40 milliGRAM(s) Oral before breakfast  piperacillin/tazobactam IVPB.. 3.375 Gram(s) IV Intermittent every 8 hours  polyethylene glycol 3350 17 Gram(s) Oral daily  potassium phosphate / sodium phosphate Powder (PHOS-NaK) 1 Packet(s) Oral once  senna 2 Tablet(s) Oral at bedtime  tamsulosin 0.4 milliGRAM(s) Oral at bedtime  timolol 0.5% Solution 1 Drop(s) Both EYES every 12 hours    MEDICATIONS  (PRN):  acetaminophen   Tablet .. 650 milliGRAM(s) Oral every 6 hours PRN Mild Pain (1 - 3)  naloxone Injectable 0.1 milliGRAM(s) IV Push every 3 minutes PRN For ANY of the following changes in patient status:  A. RR LESS THAN 10 breaths per minute, B. Oxygen saturation LESS THAN 90%, C. Sedation score of 6  ondansetron Injectable 4 milliGRAM(s) IV Push every 6 hours PRN Nausea  oxyCODONE    IR 2.5 milliGRAM(s) Oral every 4 hours PRN Moderate Pain (4 - 6)  oxyCODONE    IR 5 milliGRAM(s) Oral every 6 hours PRN Severe Pain (7 - 10)      PHYSICAL EXAM============================  General:                         Awake, alert, not in any distress  Neuro:                            Moving all extremities to commands.   Respiratory:	Air entry fair and  bilateral conducted sounds                                           Effort even and unlabored.  CV:		Regular rate and rhythm. Normal S1/S2                                          Distal pulses present.  Abdomen:	                     Soft, non-distended. Bowel sounds present   Skin:		No rash.  Extremities:	Warm, no cyanosis or edema.  Palpable pulses    ============================LABS=========================                        11.6   7.42  )-----------( 178      ( 31 Aug 2020 06:31 )             34.8     08-31    135  |  100  |  16  ----------------------------<  178<H>  3.8   |  23  |  0.79    Ca    7.5<L>      31 Aug 2020 06:31  Phos  2.3     08-31  Mg     2.2     08-31    TPro  5.6<L>  /  Alb  3.0<L>  /  TBili  1.6<H>  /  DBili  x   /  AST  21  /  ALT  29  /  AlkPhos  61  08-31    LIVER FUNCTIONS - ( 31 Aug 2020 06:31 )  Alb: 3.0 g/dL / Pro: 5.6 g/dL / ALK PHOS: 61 u/L / ALT: 29 u/L / AST: 21 u/L / GGT: x           PT/INR - ( 31 Aug 2020 06:31 )   PT: 13.1 SEC;   INR: 1.15          PTT - ( 31 Aug 2020 06:31 )  PTT:26.3 SEC        ============================IMAGING STUDIES=========================  < from: Xray Chest 1 View- PORTABLE-Routine (08.31.20 @ 07:30) >  IMPRESSION:  New mild right perihilar opacity which could be due to atelectasis or developing infection.    Continued opacification of much of the left hemithorax, of indeterminate nature.    Suggestion of small left-sided air-fluid levels, possibly loculated hydropneumothorax.    Extensive left subcutaneous emphysema again seen.    A?P:  85yMale s/p Flexible bronchoscopy, left robotic assisted VATS, pneumolysis, left upper lobectomy, partial pleurectomy and mediastinal lymph node dissection 8/25/20 , experiencing  pain with deep breathing.                             Neuro:                                         Pain control with   Tylenol / Lidoderm                            Cardiovascular:                                          Continue hemodynamic monitoring.    HTN: Restart Atenolol    HLD: On Lipitor                             Respiratory:                                         Acute hemoptysis, bright red blood, Airway monitoring, transfer to SICU                                 D/W Dr. Streeter at bedside                                CTA chest      Atelectasis:  Continue 3% Saline inhalations / pulmozyme                                                                                Encourage incentive spirometry                                          Monitor chest tube output                                         Chest tube to water seal, has air leak.                                                                  Hx of TB: Continue airborne isolation                            GI                                        NPO                                         Continue Zofran / Reglan for nausea - PRN	                                                                 Renal:                                         Continue LR                                           Monitor I/Os and electrolytes                                         BPH: On Flomax                                                 Hem/ Onc:                                                                                  Monitor chest tube output &  signs of bleeding.                                          CBC now &  in AM                           Infectious disease:      Hx of TB: Continue airborne isolation                                          Monitor for fever / leukocytosis.                                          All surgical incision / chest tube  sites look clean                            Endocrine                                             Continue Accu-Checks with coverage    Pt is on SQ Heparin and Venodyne boots for DVT prophylaxis.     Pertinent clinical, laboratory, radiographic, hemodynamic, echocardiographic, respiratory data, microbiologic data and chart were reviewed and analyzed frequently throughout the course of the day and night  Patient seen, examined and plan discussed with CT Surgeon Dr. Streeter  / CTICU team.      I spent 30 minutes at bedside providing critical care services from 10am to 11pm    Juan Bautista DO, FACEP DOMENIC HAN                     [id number]    HPI:  85  year old male presents to presurgical testing with diagnosis of malignant neoplasm of unspecified part of left bronchus or lung scheduled for left video assisted thoracoscopy robotic assisted left upper lobectomy mediastinal lymph node dissection. 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 (18 Aug 2020 14:08)    Procedure: Flexible bronchoscopy, left robotic assisted VATS, pneumolysis, left upper lobectomy, partial pleurectomy and mediastinal lymph node dissection 8/25/20  left VATS, lung hematoma with thoracentesis 31-Aug-2020       Issues:  Hemoptysis  Lung cancer  Left lung atelectasis  Postop pain  HTN  HLD  BPH  Hx of TB               Home Medications:  aspirin 81 mg oral tablet: 1 tab(s) orally once a day (25 Aug 2020 13:41)  atenolol 100 mg oral tablet: 1 tab(s) orally once a day (25 Aug 2020 13:41)  omeprazole 40 mg oral delayed release capsule: 1 cap(s) orally once a day (25 Aug 2020 13:41)  pravastatin 40 mg oral tablet: 1 tab(s) orally once a day (25 Aug 2020 13:41)  tamsulosin 0.4 mg oral capsule: 1 cap(s) orally once a day (25 Aug 2020 13:41)  timolol hemihydrate 0.5% ophthalmic solution: 1 drop(s) to each affected eye 2 times a day (25 Aug 2020 13:41)  Vitamin B Complex 100: 1 tab(s) orally once a day (25 Aug 2020 13:41)        PAST MEDICAL & SURGICAL HISTORY:  Glaucoma  Tuberculosis  Dementia  Arthritis  History of BPH  Malignant neoplasm of unspecified part of left bronchus or lung  Hyperlipidemia  Hypertension  History of lung biopsy  Pacemaker: [employer]  model [id number]  serial [id number]  date 7/1/2014  S/P total knee replacement, left            VITAL SIGNS:  Vital Signs Last 24 Hrs  T(C): 36.6 (31 Aug 2020 08:47), Max: 37.1 (30 Aug 2020 20:00)  T(F): 97.9 (31 Aug 2020 08:47), Max: 98.8 (30 Aug 2020 20:00)  HR: 65 (31 Aug 2020 08:47) (65 - 69)  BP: 131/65 (31 Aug 2020 08:47) (119/65 - 131/65)  BP(mean): --  RR: 18 (31 Aug 2020 08:47) (18 - 19)  SpO2: 96% (31 Aug 2020 08:47) (96% - 99%)    I/Os:   I&O's Detail    30 Aug 2020 07:01  -  31 Aug 2020 07:00  --------------------------------------------------------  IN:    lactated ringers.: 285 mL    Oral Fluid: 140 mL  Total IN: 425 mL    OUT:    Voided: 550 mL  Total OUT: 550 mL    Total NET: -125 mL          CAPILLARY BLOOD GLUCOSE          =======================MEDICATIONS===================  MEDICATIONS  (STANDING):  acetaminophen  IVPB .. 1000 milliGRAM(s) IV Intermittent once  acetaminophen  IVPB .. 1000 milliGRAM(s) IV Intermittent once  ATENolol  Tablet 25 milliGRAM(s) Oral daily  atorvastatin 10 milliGRAM(s) Oral at bedtime  pantoprazole    Tablet 40 milliGRAM(s) Oral before breakfast  piperacillin/tazobactam IVPB.. 3.375 Gram(s) IV Intermittent every 8 hours  polyethylene glycol 3350 17 Gram(s) Oral daily  potassium phosphate / sodium phosphate Powder (PHOS-NaK) 1 Packet(s) Oral once  senna 2 Tablet(s) Oral at bedtime  tamsulosin 0.4 milliGRAM(s) Oral at bedtime  timolol 0.5% Solution 1 Drop(s) Both EYES every 12 hours    MEDICATIONS  (PRN):  acetaminophen   Tablet .. 650 milliGRAM(s) Oral every 6 hours PRN Mild Pain (1 - 3)  naloxone Injectable 0.1 milliGRAM(s) IV Push every 3 minutes PRN For ANY of the following changes in patient status:  A. RR LESS THAN 10 breaths per minute, B. Oxygen saturation LESS THAN 90%, C. Sedation score of 6  ondansetron Injectable 4 milliGRAM(s) IV Push every 6 hours PRN Nausea  oxyCODONE    IR 2.5 milliGRAM(s) Oral every 4 hours PRN Moderate Pain (4 - 6)  oxyCODONE    IR 5 milliGRAM(s) Oral every 6 hours PRN Severe Pain (7 - 10)      PHYSICAL EXAM============================  General:                         Awake, alert, not in any distress  Neuro:                            Moving all extremities to commands.   Respiratory:	Air entry fair and  bilateral conducted sounds                                           Effort even and unlabored.  CV:		Regular rate and rhythm. Normal S1/S2                                          Distal pulses present.  Abdomen:	                     Soft, non-distended. Bowel sounds present   Skin:		No rash.  Extremities:	Warm, no cyanosis or edema.  Palpable pulses    ============================LABS=========================                        11.6   7.42  )-----------( 178      ( 31 Aug 2020 06:31 )             34.8     08-31    135  |  100  |  16  ----------------------------<  178<H>  3.8   |  23  |  0.79    Ca    7.5<L>      31 Aug 2020 06:31  Phos  2.3     08-31  Mg     2.2     08-31    TPro  5.6<L>  /  Alb  3.0<L>  /  TBili  1.6<H>  /  DBili  x   /  AST  21  /  ALT  29  /  AlkPhos  61  08-31    LIVER FUNCTIONS - ( 31 Aug 2020 06:31 )  Alb: 3.0 g/dL / Pro: 5.6 g/dL / ALK PHOS: 61 u/L / ALT: 29 u/L / AST: 21 u/L / GGT: x           PT/INR - ( 31 Aug 2020 06:31 )   PT: 13.1 SEC;   INR: 1.15          PTT - ( 31 Aug 2020 06:31 )  PTT:26.3 SEC        ============================IMAGING STUDIES=========================  < from: Xray Chest 1 View- PORTABLE-Routine (08.31.20 @ 07:30) >  IMPRESSION:  New mild right perihilar opacity which could be due to atelectasis or developing infection.    Continued opacification of much of the left hemithorax, of indeterminate nature.    Suggestion of small left-sided air-fluid levels, possibly loculated hydropneumothorax.    Extensive left subcutaneous emphysema again seen.    A/P:  85yMale s/p Flexible bronchoscopy, left robotic assisted VATS, pneumolysis, left upper lobectomy, partial pleurectomy and mediastinal lymph node dissection 8/25/20 , experiencing  pain with deep breathing.                             Neuro:   Sedated, vented                                        Pain control with Fentanyl/   Tylenol / Lidoderm                            Cardiovascular:                                          Continue hemodynamic monitoring.    HTN: on Atenolol    HLD: On Lipitor                             Respiratory:                                         Acute hemoptysis, bright red blood, Airway monitoring, s/p L VATS, Chest tube x2 placement.  transfer to SICU                                 D/W Dr. Streeter at bedside                                 Intubated, on full vent support, FB in AM     Atelectasis:  Continue 3% Saline inhalations / pulmozyme                                         ABGs, A line, Vent bundle                                         Encourage incentive spirometry                                          Monitor chest tube output                                         Chest tube to suction has air leak.                                                                  Hx of TB: Continue airborne isolation                            GI                                        NPO, IVF                                         Continue Zofran / Reglan for nausea - PRN	                                                                 Renal:                                         Continue LR  IVF                                         Monitor I/Os and electrolytes                                         BPH: On Flomax                                                 Hem/ Onc:                                                                                  Monitor chest tube output &  signs of bleeding.                                          CBC now &  in AM                           Infectious disease:      Hx of TB: Continue airborne isolation                                          Monitor for fever / leukocytosis.                                          All surgical incision / chest tube  sites look clean                            Endocrine                                             Continue Accu-Checks with coverage    Pt is on SQ Heparin and Venodyne boots for DVT prophylaxis.     Pertinent clinical, laboratory, radiographic, hemodynamic, echocardiographic, respiratory data, microbiologic data and chart were reviewed and analyzed frequently throughout the course of the day and night  Patient seen, examined and plan discussed with CT Surgeon Dr. Streeter  / CTICU team.      I spent 30 minutes at bedside providing critical care services from 10am to 11pm    Juan Bautista DO, FACEP

## 2020-08-31 NOTE — BRIEF OPERATIVE NOTE - NSICDXBRIEFPROCEDURE_GEN_ALL_CORE_FT
PROCEDURES:  Robot-assisted mediastinal lymphadenectomy 25-Aug-2020 18:38:22  Ventura Griffith  VATS, with partial pleurectomy 25-Aug-2020 18:38:11  Ventura Griffith  Thoracoscopic pneumolysis 25-Aug-2020 18:37:57  Ventura Griffith  Robot-assisted lobectomy of left lung with video-assisted thoracoscopic surgery (VATS) 25-Aug-2020 18:37:50  Ventura Griffith  Bronchoscopy, flexible, by CT surgery 25-Aug-2020 18:36:58  Ventura Griffith
PROCEDURES:  VATS, with thoracentesis 31-Aug-2020 17:13:14  Esperanza Collier  Bronchoscopy, flexible, by CT surgery 25-Aug-2020 18:36:58  Ventura Griffith

## 2020-08-31 NOTE — PROGRESS NOTE ADULT - SUBJECTIVE AND OBJECTIVE BOX
Subjective: Pt resting in bed this AM. Spoke with pt via  (ID: ). Overnight, pt states his coughing up of blood has improved. Pt states that he is feeling well this AM. He states that his breathing has improved since the surgery was completed and that the chest tightness he was experiencing in the past is better. Denies fever/chills, vomiting, and abd pain. No other concerns this AM. Pt was wanting to know what the next steps of his treatment are at the hospital.    Vital Signs:  Vital Signs Last 24 Hrs  T(C): 36.4 (08-31-20 @ 05:00), Max: 37.1 (08-30-20 @ 20:00)  T(F): 97.5 (08-31-20 @ 05:00), Max: 98.8 (08-30-20 @ 20:00)  HR: 66 (08-31-20 @ 05:00) (65 - 69)  BP: 128/62 (08-31-20 @ 05:00) (92/51 - 155/95)  RR: 19 (08-31-20 @ 05:00) (17 - 24)  SpO2: 99% (08-31-20 @ 05:00) (95% - 99%) on (O2)    Telemetry/Alarms: sinus rhythm  General: WN/WD NAD  Neurology: Awake, nonfocal, IRVIN x 4  Eyes: Scleras clear, PERRLA/ EOMI, Gross vision intact  ENT: Gross hearing intact, grossly patent pharynx, no stridor  Neck: Neck supple, trachea midline, No JVD,   Respiratory: Decreased breath sounds over the L lung bases but improved, No wheezing, rales, rhonchi  CV: RRR, S1S2, no murmurs, rubs or gallops  Abdominal: Soft, NT, ND +BS,   Extremities: No edema, + peripheral pulses  Skin: No Rashes, Hematoma, Ecchymosis  Lymphatic: No Neck, axilla, groin LAD  Psych: Oriented x 3, normal affect  Incisions: incision sites are clear, dry, and intact  Tubes: Chest tubes out since Saturday  Relevant labs, radiology and Medications reviewed. CXR - stable                        11.6   7.42  )-----------( 178      ( 31 Aug 2020 06:31 )             34.8     08-31    135  |  100  |  16  ----------------------------<  178<H>  3.8   |  23  |  0.79    Ca    7.5<L>      31 Aug 2020 06:31  Phos  2.3     08-31  Mg     2.2     08-31    TPro  5.6<L>  /  Alb  3.0<L>  /  TBili  1.6<H>  /  DBili  x   /  AST  21  /  ALT  29  /  AlkPhos  61  08-31    PT/INR - ( 31 Aug 2020 06:31 )   PT: 13.1 SEC;   INR: 1.15          PTT - ( 31 Aug 2020 06:31 )  PTT:26.3 SEC  MEDICATIONS  (STANDING):  acetaminophen  IVPB .. 1000 milliGRAM(s) IV Intermittent once  acetaminophen  IVPB .. 1000 milliGRAM(s) IV Intermittent once  ATENolol  Tablet 25 milliGRAM(s) Oral daily  atorvastatin 10 milliGRAM(s) Oral at bedtime  pantoprazole    Tablet 40 milliGRAM(s) Oral before breakfast  piperacillin/tazobactam IVPB.. 3.375 Gram(s) IV Intermittent every 8 hours  polyethylene glycol 3350 17 Gram(s) Oral daily  senna 2 Tablet(s) Oral at bedtime  tamsulosin 0.4 milliGRAM(s) Oral at bedtime  timolol 0.5% Solution 1 Drop(s) Both EYES every 12 hours  vancomycin  IVPB 1000 milliGRAM(s) IV Intermittent every 12 hours  vancomycin  IVPB        MEDICATIONS  (PRN):  acetaminophen   Tablet .. 650 milliGRAM(s) Oral every 6 hours PRN Mild Pain (1 - 3)  naloxone Injectable 0.1 milliGRAM(s) IV Push every 3 minutes PRN For ANY of the following changes in patient status:  A. RR LESS THAN 10 breaths per minute, B. Oxygen saturation LESS THAN 90%, C. Sedation score of 6  ondansetron Injectable 4 milliGRAM(s) IV Push every 6 hours PRN Nausea  oxyCODONE    IR 2.5 milliGRAM(s) Oral every 4 hours PRN Moderate Pain (4 - 6)  oxyCODONE    IR 5 milliGRAM(s) Oral every 6 hours PRN Severe Pain (7 - 10)    Pertinent Physical Exam  I&O's Summary    30 Aug 2020 07:01  -  31 Aug 2020 07:00  --------------------------------------------------------  IN: 425 mL / OUT: 550 mL / NET: -125 mL    Assessment  85y Male  w/ PAST MEDICAL & SURGICAL HISTORY:  Glaucoma  Tuberculosis  Dementia  Arthritis  History of BPH  Malignant neoplasm of unspecified part of left bronchus or lung  Hyperlipidemia  Hypertension  History of lung biopsy  Pacemaker: [employer]  model [id number]  serial [id number]  date 7/1/2014  S/P total knee replacement, left    85 M s/p Flexible bronchoscopy, left robotic assisted VATS, pneumolysis, left upper lobectomy, partial pleurectomy and mediastinal lymph node dissection 8/25/20 , experiencing  pain with deep breathing. Overnight, pt coughing up thick bloody sputum that improved as of this morning. Pt's CXR improved. Pending sputum cultures, on vanc/zosyn, and stopped ASA/HSQ.    PLAN  Neuro: Pain management  Pulm: Encourage coughing, deep breathing and use of incentive spirometry. Wean off supplemental oxygen as able. Daily CXR.   Cardio: Monitor telemetry/alarms  GI: Tolerating diet. Continue stool softeners.  Renal: monitor urine output, supplement electrolytes as needed  Vasc: Heparin SC/SCDs for DVT prophylaxis  Heme: Stable H/H. .   ID: Off antibiotics. Stable.  Therapy: OOB/ambulate  Tubes: Monitor Chest tube output  Disposition: Aim to D/C to home tomorrow if pt w/o hemoptysis and stable today  Discussed with Cardiothoracic Team at AM rounds.    Gt Berkowitz, PGY-1  Thoracic Surgery  page [phone] Subjective: Pt resting in bed this AM. Spoke with pt via  (ID: ). Overnight, pt states his coughing up of blood has improved. Pt states that he is feeling well this AM. He states that his breathing has improved since the surgery was completed and that the chest tightness he was experiencing in the past is better. Denies fever/chills, vomiting, and abd pain. No other concerns this AM. Pt was wanting to know what the next steps of his treatment are at the hospital.    Vital Signs:  Vital Signs Last 24 Hrs  T(C): 36.4 (08-31-20 @ 05:00), Max: 37.1 (08-30-20 @ 20:00)  T(F): 97.5 (08-31-20 @ 05:00), Max: 98.8 (08-30-20 @ 20:00)  HR: 66 (08-31-20 @ 05:00) (65 - 69)  BP: 128/62 (08-31-20 @ 05:00) (92/51 - 155/95)  RR: 19 (08-31-20 @ 05:00) (17 - 24)  SpO2: 99% (08-31-20 @ 05:00) (95% - 99%) on (O2)    Telemetry/Alarms: sinus rhythm  General: WN/WD NAD  Neurology: Awake, nonfocal, IRVIN x 4  Eyes: Scleras clear, PERRLA/ EOMI, Gross vision intact  ENT: Gross hearing intact, grossly patent pharynx, no stridor  Neck: Neck supple, trachea midline, No JVD,   Respiratory: Decreased breath sounds over the L lung bases but improved, No wheezing, rales, rhonchi  CV: RRR, S1S2, no murmurs, rubs or gallops  Abdominal: Soft, NT, ND +BS,   Extremities: No edema, + peripheral pulses  Skin: No Rashes, Hematoma, Ecchymosis  Lymphatic: No Neck, axilla, groin LAD  Psych: Oriented x 3, normal affect  Incisions: incision sites are clear, dry, and intact  Tubes: Chest tubes out since Saturday  Relevant labs, radiology and Medications reviewed. CXR - stable                        11.6   7.42  )-----------( 178      ( 31 Aug 2020 06:31 )             34.8     08-31    135  |  100  |  16  ----------------------------<  178<H>  3.8   |  23  |  0.79    Ca    7.5<L>      31 Aug 2020 06:31  Phos  2.3     08-31  Mg     2.2     08-31    TPro  5.6<L>  /  Alb  3.0<L>  /  TBili  1.6<H>  /  DBili  x   /  AST  21  /  ALT  29  /  AlkPhos  61  08-31    PT/INR - ( 31 Aug 2020 06:31 )   PT: 13.1 SEC;   INR: 1.15          PTT - ( 31 Aug 2020 06:31 )  PTT:26.3 SEC  MEDICATIONS  (STANDING):  acetaminophen  IVPB .. 1000 milliGRAM(s) IV Intermittent once  acetaminophen  IVPB .. 1000 milliGRAM(s) IV Intermittent once  ATENolol  Tablet 25 milliGRAM(s) Oral daily  atorvastatin 10 milliGRAM(s) Oral at bedtime  pantoprazole    Tablet 40 milliGRAM(s) Oral before breakfast  piperacillin/tazobactam IVPB.. 3.375 Gram(s) IV Intermittent every 8 hours  polyethylene glycol 3350 17 Gram(s) Oral daily  senna 2 Tablet(s) Oral at bedtime  tamsulosin 0.4 milliGRAM(s) Oral at bedtime  timolol 0.5% Solution 1 Drop(s) Both EYES every 12 hours  vancomycin  IVPB 1000 milliGRAM(s) IV Intermittent every 12 hours  vancomycin  IVPB        MEDICATIONS  (PRN):  acetaminophen   Tablet .. 650 milliGRAM(s) Oral every 6 hours PRN Mild Pain (1 - 3)  naloxone Injectable 0.1 milliGRAM(s) IV Push every 3 minutes PRN For ANY of the following changes in patient status:  A. RR LESS THAN 10 breaths per minute, B. Oxygen saturation LESS THAN 90%, C. Sedation score of 6  ondansetron Injectable 4 milliGRAM(s) IV Push every 6 hours PRN Nausea  oxyCODONE    IR 2.5 milliGRAM(s) Oral every 4 hours PRN Moderate Pain (4 - 6)  oxyCODONE    IR 5 milliGRAM(s) Oral every 6 hours PRN Severe Pain (7 - 10)    Pertinent Physical Exam  I&O's Summary    30 Aug 2020 07:01  -  31 Aug 2020 07:00  --------------------------------------------------------  IN: 425 mL / OUT: 550 mL / NET: -125 mL    Assessment  85y Male  w/ PAST MEDICAL & SURGICAL HISTORY:  Glaucoma  Tuberculosis  Dementia  Arthritis  History of BPH  Malignant neoplasm of unspecified part of left bronchus or lung  Hyperlipidemia  Hypertension  History of lung biopsy  Pacemaker: [employer]  model [id number]  serial [id number]  date 7/1/2014  S/P total knee replacement, left    85 M s/p Flexible bronchoscopy, left robotic assisted VATS, pneumolysis, left upper lobectomy, partial pleurectomy and mediastinal lymph node dissection 8/25/20 , experiencing  pain with deep breathing. Overnight, pt coughing up thick bloody sputum that improved as of this morning. Pt's CXR improved. Pending sputum cultures, on vanc/zosyn, and stopped ASA/HSQ.    PLAN  Neuro: Pain management  Pulm: Encourage coughing, deep breathing and use of incentive spirometry. Wean off supplemental oxygen as able. Daily CXR. Continue Chest PT, bronchodilators  Cardio: Monitor telemetry/alarms  GI: Tolerating diet. Continue stool softeners.  Renal: monitor urine output, supplement electrolytes as needed  Vasc: Heparin SC/SCDs for DVT prophylaxis  Heme: Stable H/H. .   ID: Off antibiotics. Stable.  Therapy: OOB/ambulate  Tubes: Monitor Chest tube output  Disposition: Aim to D/C to rehab facility tomorrow if pt w/o hemoptysis and stable today  Discussed with Cardiothoracic Team at AM rounds.    Gt Berkowitz, PGY-1  Thoracic Surgery  page [phone] Subjective: Pt resting in bed this AM. Spoke to pt w/ Dr. Streeter. Overnight, pt states his coughing up of blood has improved. Pt states that he is feeling well this AM. He states that his breathing has improved. Denies fever/chills, vomiting, and abd pain. No other concerns this AM.    Vital Signs:  Vital Signs Last 24 Hrs  T(C): 36.4 (08-31-20 @ 05:00), Max: 37.1 (08-30-20 @ 20:00)  T(F): 97.5 (08-31-20 @ 05:00), Max: 98.8 (08-30-20 @ 20:00)  HR: 66 (08-31-20 @ 05:00) (65 - 69)  BP: 128/62 (08-31-20 @ 05:00) (92/51 - 155/95)  RR: 19 (08-31-20 @ 05:00) (17 - 24)  SpO2: 99% (08-31-20 @ 05:00) (95% - 99%) on (O2)    Telemetry/Alarms: sinus rhythm  General: WN/WD NAD  Neurology: Awake, nonfocal, IRVIN x 4  Eyes: Scleras clear, PERRLA/ EOMI, Gross vision intact  ENT: Gross hearing intact, grossly patent pharynx, no stridor  Neck: Neck supple, trachea midline, No JVD,   Respiratory: Decreased breath sounds over the L lung bases but improved, No wheezing, rales, rhonchi  CV: RRR, S1S2, no murmurs, rubs or gallops  Abdominal: Soft, NT, ND +BS,   Extremities: No edema, + peripheral pulses  Skin: No Rashes, Hematoma, Ecchymosis  Lymphatic: No Neck, axilla, groin LAD  Psych: Oriented x 3, normal affect  Incisions: incision sites are clear, dry, and intact  Tubes: Chest tubes out since Saturday  Relevant labs, radiology and Medications reviewed. CXR - stable                        11.6   7.42  )-----------( 178      ( 31 Aug 2020 06:31 )             34.8     08-31    135  |  100  |  16  ----------------------------<  178<H>  3.8   |  23  |  0.79    Ca    7.5<L>      31 Aug 2020 06:31  Phos  2.3     08-31  Mg     2.2     08-31    TPro  5.6<L>  /  Alb  3.0<L>  /  TBili  1.6<H>  /  DBili  x   /  AST  21  /  ALT  29  /  AlkPhos  61  08-31    PT/INR - ( 31 Aug 2020 06:31 )   PT: 13.1 SEC;   INR: 1.15          PTT - ( 31 Aug 2020 06:31 )  PTT:26.3 SEC  MEDICATIONS  (STANDING):  acetaminophen  IVPB .. 1000 milliGRAM(s) IV Intermittent once  acetaminophen  IVPB .. 1000 milliGRAM(s) IV Intermittent once  ATENolol  Tablet 25 milliGRAM(s) Oral daily  atorvastatin 10 milliGRAM(s) Oral at bedtime  pantoprazole    Tablet 40 milliGRAM(s) Oral before breakfast  piperacillin/tazobactam IVPB.. 3.375 Gram(s) IV Intermittent every 8 hours  polyethylene glycol 3350 17 Gram(s) Oral daily  senna 2 Tablet(s) Oral at bedtime  tamsulosin 0.4 milliGRAM(s) Oral at bedtime  timolol 0.5% Solution 1 Drop(s) Both EYES every 12 hours  vancomycin  IVPB 1000 milliGRAM(s) IV Intermittent every 12 hours  vancomycin  IVPB        MEDICATIONS  (PRN):  acetaminophen   Tablet .. 650 milliGRAM(s) Oral every 6 hours PRN Mild Pain (1 - 3)  naloxone Injectable 0.1 milliGRAM(s) IV Push every 3 minutes PRN For ANY of the following changes in patient status:  A. RR LESS THAN 10 breaths per minute, B. Oxygen saturation LESS THAN 90%, C. Sedation score of 6  ondansetron Injectable 4 milliGRAM(s) IV Push every 6 hours PRN Nausea  oxyCODONE    IR 2.5 milliGRAM(s) Oral every 4 hours PRN Moderate Pain (4 - 6)  oxyCODONE    IR 5 milliGRAM(s) Oral every 6 hours PRN Severe Pain (7 - 10)    Pertinent Physical Exam  I&O's Summary    30 Aug 2020 07:01  -  31 Aug 2020 07:00  --------------------------------------------------------  IN: 425 mL / OUT: 550 mL / NET: -125 mL    Assessment  85y Male  w/ PAST MEDICAL & SURGICAL HISTORY:  Glaucoma  Tuberculosis  Dementia  Arthritis  History of BPH  Malignant neoplasm of unspecified part of left bronchus or lung  Hyperlipidemia  Hypertension  History of lung biopsy  Pacemaker: [employer]  model [id number]  serial [id number]  date 7/1/2014  S/P total knee replacement, left    85 M s/p Flexible bronchoscopy, left robotic assisted VATS, pneumolysis, left upper lobectomy, partial pleurectomy and mediastinal lymph node dissection 8/25/20 , experiencing pain with deep breathing. Overnight, pt coughing up thick bloody sputum that improved as of this morning. Pt's CXR improved. Pending sputum cultures, on zosyn, and stopped ASA/HSQ.    PLAN  Neuro: Pain management  Pulm: Encourage coughing, deep breathing and use of incentive spirometry. Wean off supplemental oxygen as able. Daily CXR. Continue Chest PT, bronchodilators  Cardio: Monitor telemetry/alarms  GI: Tolerating diet. Continue stool softeners.  Renal: monitor urine output, supplement electrolytes as needed  Vasc: Heparin SC/SCDs for DVT prophylaxis  Heme: Stable H/H.  ID: On zosyn, stopped vanc 2/2 high trough. Ordered trough for AM.  Therapy: OOB/ambulate  Tubes: Chest tubes out as of last Saturday  Disposition: Aim to D/C to rehab facility tomorrow if pt w/o hemoptysis and stable today  Discussed with Cardiothoracic Team at AM rounds.    Gt Berkowitz, PGY-1  Thoracic Surgery  page [phone] Subjective: Pt resting in bed this AM. Spoke to pt w/ Dr. Streeter. Overnight, pt states he is coughing up bright red blood that has continued into today. He states that his breathing has improved. Denies fever/chills, vomiting, and abd pain. No other concerns this AM.    Vital Signs:  Vital Signs Last 24 Hrs  T(C): 36.4 (08-31-20 @ 05:00), Max: 37.1 (08-30-20 @ 20:00)  T(F): 97.5 (08-31-20 @ 05:00), Max: 98.8 (08-30-20 @ 20:00)  HR: 66 (08-31-20 @ 05:00) (65 - 69)  BP: 128/62 (08-31-20 @ 05:00) (92/51 - 155/95)  RR: 19 (08-31-20 @ 05:00) (17 - 24)  SpO2: 99% (08-31-20 @ 05:00) (95% - 99%) on (O2)    Telemetry/Alarms: sinus rhythm  General: WN/WD NAD  Neurology: Awake, nonfocal, IRVIN x 4  Eyes: Scleras clear, PERRLA/ EOMI, Gross vision intact  ENT: Gross hearing intact, grossly patent pharynx, no stridor  Neck: Neck supple, trachea midline, No JVD,   Respiratory: Decreased breath sounds over the L lung bases but improved, No wheezing, rales, rhonchi  CV: RRR, S1S2, no murmurs, rubs or gallops  Abdominal: Soft, NT, ND +BS,   Extremities: No edema, + peripheral pulses  Skin: No Rashes, Hematoma, Ecchymosis  Lymphatic: No Neck, axilla, groin LAD  Psych: Oriented x 3, normal affect  Incisions: incision sites are clear, dry, and intact  Tubes: Chest tubes out since Saturday  Relevant labs, radiology and Medications reviewed. CXR - stable                        11.6   7.42  )-----------( 178      ( 31 Aug 2020 06:31 )             34.8     08-31    135  |  100  |  16  ----------------------------<  178<H>  3.8   |  23  |  0.79    Ca    7.5<L>      31 Aug 2020 06:31  Phos  2.3     08-31  Mg     2.2     08-31    TPro  5.6<L>  /  Alb  3.0<L>  /  TBili  1.6<H>  /  DBili  x   /  AST  21  /  ALT  29  /  AlkPhos  61  08-31    PT/INR - ( 31 Aug 2020 06:31 )   PT: 13.1 SEC;   INR: 1.15          PTT - ( 31 Aug 2020 06:31 )  PTT:26.3 SEC  MEDICATIONS  (STANDING):  acetaminophen  IVPB .. 1000 milliGRAM(s) IV Intermittent once  acetaminophen  IVPB .. 1000 milliGRAM(s) IV Intermittent once  ATENolol  Tablet 25 milliGRAM(s) Oral daily  atorvastatin 10 milliGRAM(s) Oral at bedtime  pantoprazole    Tablet 40 milliGRAM(s) Oral before breakfast  piperacillin/tazobactam IVPB.. 3.375 Gram(s) IV Intermittent every 8 hours  polyethylene glycol 3350 17 Gram(s) Oral daily  senna 2 Tablet(s) Oral at bedtime  tamsulosin 0.4 milliGRAM(s) Oral at bedtime  timolol 0.5% Solution 1 Drop(s) Both EYES every 12 hours  vancomycin  IVPB 1000 milliGRAM(s) IV Intermittent every 12 hours  vancomycin  IVPB        MEDICATIONS  (PRN):  acetaminophen   Tablet .. 650 milliGRAM(s) Oral every 6 hours PRN Mild Pain (1 - 3)  naloxone Injectable 0.1 milliGRAM(s) IV Push every 3 minutes PRN For ANY of the following changes in patient status:  A. RR LESS THAN 10 breaths per minute, B. Oxygen saturation LESS THAN 90%, C. Sedation score of 6  ondansetron Injectable 4 milliGRAM(s) IV Push every 6 hours PRN Nausea  oxyCODONE    IR 2.5 milliGRAM(s) Oral every 4 hours PRN Moderate Pain (4 - 6)  oxyCODONE    IR 5 milliGRAM(s) Oral every 6 hours PRN Severe Pain (7 - 10)    Pertinent Physical Exam  I&O's Summary    30 Aug 2020 07:01  -  31 Aug 2020 07:00  --------------------------------------------------------  IN: 425 mL / OUT: 550 mL / NET: -125 mL    Assessment  85y Male  w/ PAST MEDICAL & SURGICAL HISTORY:  Glaucoma  Tuberculosis  Dementia  Arthritis  History of BPH  Malignant neoplasm of unspecified part of left bronchus or lung  Hyperlipidemia  Hypertension  History of lung biopsy  Pacemaker: [employer]  model [id number]  serial [id number]  date 7/1/2014  S/P total knee replacement, left    85 M s/p Flexible bronchoscopy, left robotic assisted VATS, pneumolysis, left upper lobectomy, partial pleurectomy and mediastinal lymph node dissection 8/25/20 , experiencing pain with deep breathing. Overnight, pt coughing up thick bloody sputum that has continued into today. Pending sputum cultures, on zosyn, and stopped ASA/HSQ.    PLAN  Neuro: Pain management  Pulm: Encourage coughing, deep breathing and use of incentive spirometry. Wean off supplemental oxygen as able. Daily CXR. Continue Chest PT, bronchodilators. Pt is continuing to cough up blood, requiring emergent IR embolization and SICU placement  Cardio: Monitor telemetry/alarms  GI: Tolerating diet. Continue stool softeners.  Renal: monitor urine output, supplement electrolytes as needed  Vasc: Heparin SC/SCDs for DVT prophylaxis  Heme: Stable H/H.  ID: On zosyn, stopped vanc 2/2 high trough. Ordered trough for AM.  Therapy: OOB/ambulate  Tubes: Chest tubes out as of last Saturday  Disposition: Aim to D/C to rehab facility tomorrow if pt w/o hemoptysis and stable today  Discussed with Cardiothoracic Team at AM rounds.    Gt Berkowitz, PGY-1  Thoracic Surgery  page [phone]

## 2020-08-31 NOTE — BRIEF OPERATIVE NOTE - OPERATION/FINDINGS
Flexible bronchoscopy, left robotic assisted VATS, pneumolysis, left upper lobectomy, partial pleurectomy and mediastinal lymph node dissection
s/p bronchoscopy, left VATS, lung hematoma seen with no active bleeding  pleural effusion sent for culture  bronchoscopy post-procedure showed mild bleeding from left mainstem

## 2020-09-01 LAB
ANION GAP SERPL CALC-SCNC: 11 MMO/L — SIGNIFICANT CHANGE UP (ref 7–14)
ANION GAP SERPL CALC-SCNC: 11 MMO/L — SIGNIFICANT CHANGE UP (ref 7–14)
BASE EXCESS BLDA CALC-SCNC: 1.1 MMOL/L — SIGNIFICANT CHANGE UP
BASE EXCESS BLDA CALC-SCNC: 1.5 MMOL/L — SIGNIFICANT CHANGE UP
BASOPHILS # BLD AUTO: 0.01 K/UL — SIGNIFICANT CHANGE UP (ref 0–0.2)
BASOPHILS NFR BLD AUTO: 0.1 % — SIGNIFICANT CHANGE UP (ref 0–2)
BUN SERPL-MCNC: 13 MG/DL — SIGNIFICANT CHANGE UP (ref 7–23)
BUN SERPL-MCNC: 13 MG/DL — SIGNIFICANT CHANGE UP (ref 7–23)
CALCIUM SERPL-MCNC: 7.8 MG/DL — LOW (ref 8.4–10.5)
CALCIUM SERPL-MCNC: 7.8 MG/DL — LOW (ref 8.4–10.5)
CHLORIDE BLDA-SCNC: 101 MMOL/L — SIGNIFICANT CHANGE UP (ref 96–108)
CHLORIDE BLDA-SCNC: 105 MMOL/L — SIGNIFICANT CHANGE UP (ref 96–108)
CHLORIDE SERPL-SCNC: 98 MMOL/L — SIGNIFICANT CHANGE UP (ref 98–107)
CHLORIDE SERPL-SCNC: 98 MMOL/L — SIGNIFICANT CHANGE UP (ref 98–107)
CO2 SERPL-SCNC: 22 MMOL/L — SIGNIFICANT CHANGE UP (ref 22–31)
CO2 SERPL-SCNC: 22 MMOL/L — SIGNIFICANT CHANGE UP (ref 22–31)
CREAT SERPL-MCNC: 0.67 MG/DL — SIGNIFICANT CHANGE UP (ref 0.5–1.3)
CREAT SERPL-MCNC: 0.67 MG/DL — SIGNIFICANT CHANGE UP (ref 0.5–1.3)
EOSINOPHIL # BLD AUTO: 0 K/UL — SIGNIFICANT CHANGE UP (ref 0–0.5)
EOSINOPHIL NFR BLD AUTO: 0 % — SIGNIFICANT CHANGE UP (ref 0–6)
GLUCOSE BLDA-MCNC: 124 MG/DL — HIGH (ref 70–99)
GLUCOSE BLDA-MCNC: 125 MG/DL — HIGH (ref 70–99)
GLUCOSE SERPL-MCNC: 153 MG/DL — HIGH (ref 70–99)
GLUCOSE SERPL-MCNC: 153 MG/DL — HIGH (ref 70–99)
HCO3 BLDA-SCNC: 26 MMOL/L — SIGNIFICANT CHANGE UP (ref 22–26)
HCO3 BLDA-SCNC: 26 MMOL/L — SIGNIFICANT CHANGE UP (ref 22–26)
HCT VFR BLD CALC: 32.3 % — LOW (ref 39–50)
HCT VFR BLD CALC: 32.3 % — LOW (ref 39–50)
HCT VFR BLDA CALC: 35.9 % — LOW (ref 39–51)
HCT VFR BLDA CALC: 38.4 % — LOW (ref 39–51)
HGB BLD-MCNC: 11 G/DL — LOW (ref 13–17)
HGB BLD-MCNC: 11 G/DL — LOW (ref 13–17)
HGB BLDA-MCNC: 11.6 G/DL — LOW (ref 13–17)
HGB BLDA-MCNC: 12.5 G/DL — LOW (ref 13–17)
IMM GRANULOCYTES NFR BLD AUTO: 1.1 % — SIGNIFICANT CHANGE UP (ref 0–1.5)
LACTATE BLDA-SCNC: 1.3 MMOL/L — SIGNIFICANT CHANGE UP (ref 0.5–2)
LACTATE BLDA-SCNC: 1.4 MMOL/L — SIGNIFICANT CHANGE UP (ref 0.5–2)
LYMPHOCYTES # BLD AUTO: 0.48 K/UL — LOW (ref 1–3.3)
LYMPHOCYTES # BLD AUTO: 6.3 % — LOW (ref 13–44)
MAGNESIUM SERPL-MCNC: 2.3 MG/DL — SIGNIFICANT CHANGE UP (ref 1.6–2.6)
MCHC RBC-ENTMCNC: 31.2 PG — SIGNIFICANT CHANGE UP (ref 27–34)
MCHC RBC-ENTMCNC: 31.2 PG — SIGNIFICANT CHANGE UP (ref 27–34)
MCHC RBC-ENTMCNC: 34.1 % — SIGNIFICANT CHANGE UP (ref 32–36)
MCHC RBC-ENTMCNC: 34.1 % — SIGNIFICANT CHANGE UP (ref 32–36)
MCV RBC AUTO: 91.5 FL — SIGNIFICANT CHANGE UP (ref 80–100)
MCV RBC AUTO: 91.5 FL — SIGNIFICANT CHANGE UP (ref 80–100)
MONOCYTES # BLD AUTO: 0.45 K/UL — SIGNIFICANT CHANGE UP (ref 0–0.9)
MONOCYTES NFR BLD AUTO: 5.9 % — SIGNIFICANT CHANGE UP (ref 2–14)
NEUTROPHILS # BLD AUTO: 6.56 K/UL — SIGNIFICANT CHANGE UP (ref 1.8–7.4)
NEUTROPHILS NFR BLD AUTO: 86.6 % — HIGH (ref 43–77)
NRBC # FLD: 0 K/UL — SIGNIFICANT CHANGE UP (ref 0–0)
NRBC # FLD: 0 K/UL — SIGNIFICANT CHANGE UP (ref 0–0)
PCO2 BLDA: 35 MMHG — SIGNIFICANT CHANGE UP (ref 35–48)
PCO2 BLDA: 38 MMHG — SIGNIFICANT CHANGE UP (ref 35–48)
PH BLDA: 7.44 PH — SIGNIFICANT CHANGE UP (ref 7.35–7.45)
PH BLDA: 7.47 PH — HIGH (ref 7.35–7.45)
PHOSPHATE SERPL-MCNC: 3.9 MG/DL — SIGNIFICANT CHANGE UP (ref 2.5–4.5)
PLATELET # BLD AUTO: 186 K/UL — SIGNIFICANT CHANGE UP (ref 150–400)
PLATELET # BLD AUTO: 186 K/UL — SIGNIFICANT CHANGE UP (ref 150–400)
PMV BLD: 9.5 FL — SIGNIFICANT CHANGE UP (ref 7–13)
PMV BLD: 9.5 FL — SIGNIFICANT CHANGE UP (ref 7–13)
PO2 BLDA: 150 MMHG — HIGH (ref 83–108)
PO2 BLDA: 154 MMHG — HIGH (ref 83–108)
POTASSIUM BLDA-SCNC: 3.8 MMOL/L — SIGNIFICANT CHANGE UP (ref 3.4–4.5)
POTASSIUM BLDA-SCNC: 3.8 MMOL/L — SIGNIFICANT CHANGE UP (ref 3.4–4.5)
POTASSIUM SERPL-MCNC: 4.1 MMOL/L — SIGNIFICANT CHANGE UP (ref 3.5–5.3)
POTASSIUM SERPL-MCNC: 4.1 MMOL/L — SIGNIFICANT CHANGE UP (ref 3.5–5.3)
POTASSIUM SERPL-SCNC: 4.1 MMOL/L — SIGNIFICANT CHANGE UP (ref 3.5–5.3)
POTASSIUM SERPL-SCNC: 4.1 MMOL/L — SIGNIFICANT CHANGE UP (ref 3.5–5.3)
RBC # BLD: 3.53 M/UL — LOW (ref 4.2–5.8)
RBC # BLD: 3.53 M/UL — LOW (ref 4.2–5.8)
RBC # FLD: 12.2 % — SIGNIFICANT CHANGE UP (ref 10.3–14.5)
RBC # FLD: 12.2 % — SIGNIFICANT CHANGE UP (ref 10.3–14.5)
SAO2 % BLDA: 99.3 % — HIGH (ref 95–99)
SAO2 % BLDA: 99.4 % — HIGH (ref 95–99)
SODIUM BLDA-SCNC: 131 MMOL/L — LOW (ref 136–146)
SODIUM BLDA-SCNC: 134 MMOL/L — LOW (ref 136–146)
SODIUM SERPL-SCNC: 131 MMOL/L — LOW (ref 135–145)
SODIUM SERPL-SCNC: 131 MMOL/L — LOW (ref 135–145)
VANCOMYCIN FLD-MCNC: 4.1 UG/ML — SIGNIFICANT CHANGE UP
WBC # BLD: 7.58 K/UL — SIGNIFICANT CHANGE UP (ref 3.8–10.5)
WBC # BLD: 7.58 K/UL — SIGNIFICANT CHANGE UP (ref 3.8–10.5)
WBC # FLD AUTO: 7.58 K/UL — SIGNIFICANT CHANGE UP (ref 3.8–10.5)
WBC # FLD AUTO: 7.58 K/UL — SIGNIFICANT CHANGE UP (ref 3.8–10.5)

## 2020-09-01 PROCEDURE — 71045 X-RAY EXAM CHEST 1 VIEW: CPT | Mod: 26

## 2020-09-01 PROCEDURE — 31624 DX BRONCHOSCOPE/LAVAGE: CPT | Mod: 78

## 2020-09-01 PROCEDURE — 31645 BRNCHSC W/THER ASPIR 1ST: CPT | Mod: 78

## 2020-09-01 PROCEDURE — 99291 CRITICAL CARE FIRST HOUR: CPT

## 2020-09-01 RX ORDER — HYDROMORPHONE HYDROCHLORIDE 2 MG/ML
30 INJECTION INTRAMUSCULAR; INTRAVENOUS; SUBCUTANEOUS
Refills: 0 | Status: DISCONTINUED | OUTPATIENT
Start: 2020-09-01 | End: 2020-09-02

## 2020-09-01 RX ORDER — LIDOCAINE 4 G/100G
1 CREAM TOPICAL ONCE
Refills: 0 | Status: COMPLETED | OUTPATIENT
Start: 2020-09-01 | End: 2020-09-01

## 2020-09-01 RX ORDER — HYDROMORPHONE HYDROCHLORIDE 2 MG/ML
0.4 INJECTION INTRAMUSCULAR; INTRAVENOUS; SUBCUTANEOUS
Refills: 0 | Status: DISCONTINUED | OUTPATIENT
Start: 2020-09-01 | End: 2020-09-02

## 2020-09-01 RX ORDER — ACETAMINOPHEN 500 MG
1000 TABLET ORAL ONCE
Refills: 0 | Status: DISCONTINUED | OUTPATIENT
Start: 2020-09-02 | End: 2020-09-06

## 2020-09-01 RX ORDER — ACETAMINOPHEN 500 MG
1000 TABLET ORAL ONCE
Refills: 0 | Status: COMPLETED | OUTPATIENT
Start: 2020-09-01 | End: 2020-09-01

## 2020-09-01 RX ORDER — ACETAMINOPHEN 500 MG
1000 TABLET ORAL ONCE
Refills: 0 | Status: COMPLETED | OUTPATIENT
Start: 2020-09-01 | End: 2020-09-02

## 2020-09-01 RX ADMIN — PIPERACILLIN AND TAZOBACTAM 25 GRAM(S): 4; .5 INJECTION, POWDER, LYOPHILIZED, FOR SOLUTION INTRAVENOUS at 05:36

## 2020-09-01 RX ADMIN — PIPERACILLIN AND TAZOBACTAM 25 GRAM(S): 4; .5 INJECTION, POWDER, LYOPHILIZED, FOR SOLUTION INTRAVENOUS at 13:07

## 2020-09-01 RX ADMIN — Medication 1000 MILLIGRAM(S): at 06:34

## 2020-09-01 RX ADMIN — Medication 1 DROP(S): at 17:11

## 2020-09-01 RX ADMIN — HEPARIN SODIUM 5000 UNIT(S): 5000 INJECTION INTRAVENOUS; SUBCUTANEOUS at 05:36

## 2020-09-01 RX ADMIN — ATORVASTATIN CALCIUM 10 MILLIGRAM(S): 80 TABLET, FILM COATED ORAL at 21:34

## 2020-09-01 RX ADMIN — LIDOCAINE 1 PATCH: 4 CREAM TOPICAL at 19:52

## 2020-09-01 RX ADMIN — CHLORHEXIDINE GLUCONATE 15 MILLILITER(S): 213 SOLUTION TOPICAL at 05:36

## 2020-09-01 RX ADMIN — TAMSULOSIN HYDROCHLORIDE 0.4 MILLIGRAM(S): 0.4 CAPSULE ORAL at 21:34

## 2020-09-01 RX ADMIN — Medication 1 DROP(S): at 05:37

## 2020-09-01 RX ADMIN — SENNA PLUS 2 TABLET(S): 8.6 TABLET ORAL at 21:34

## 2020-09-01 RX ADMIN — HEPARIN SODIUM 5000 UNIT(S): 5000 INJECTION INTRAVENOUS; SUBCUTANEOUS at 17:10

## 2020-09-01 RX ADMIN — Medication 400 MILLIGRAM(S): at 06:04

## 2020-09-01 RX ADMIN — PIPERACILLIN AND TAZOBACTAM 25 GRAM(S): 4; .5 INJECTION, POWDER, LYOPHILIZED, FOR SOLUTION INTRAVENOUS at 21:37

## 2020-09-01 RX ADMIN — Medication 1000 MILLIGRAM(S): at 17:40

## 2020-09-01 RX ADMIN — Medication 400 MILLIGRAM(S): at 17:10

## 2020-09-01 RX ADMIN — LIDOCAINE 1 PATCH: 4 CREAM TOPICAL at 17:09

## 2020-09-01 NOTE — PROGRESS NOTE ADULT - SUBJECTIVE AND OBJECTIVE BOX
Chief Complaint:  unrelieved surgical pain    HPI:  85  year old male presents to presurgical testing with diagnosis of malignant neoplasm of unspecified part of left bronchus or lung scheduled for left video assisted thoracoscopy robotic assisted left upper lobectomy mediastinal lymph node dissection. 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 (18 Aug 2020 14:08)      PAST MEDICAL & SURGICAL HISTORY:  Glaucoma  Tuberculosis  Dementia  Arthritis  History of BPH  Malignant neoplasm of unspecified part of left bronchus or lung  Hyperlipidemia  Hypertension  History of lung biopsy  Pacemaker: [employer]  model [id number]  serial [id number]  date 7/1/2014  S/P total knee replacement, left      FAMILY HISTORY:  FH: heart attack: sibling      SOCIAL HISTORY:  [ ] Denies Smoking, Alcohol, or Drug Use    Allergies    fish (Other (Mild))  No Known Drug Allergies  shellfish (Other (Mild))    Intolerances        PAIN MEDICATIONS:  acetaminophen   Tablet .. 650 milliGRAM(s) Oral every 6 hours PRN  acetaminophen  IVPB .. 1000 milliGRAM(s) IV Intermittent once  acetaminophen  IVPB .. 1000 milliGRAM(s) IV Intermittent once  fentaNYL    Injectable 25 MICROGram(s) IV Push every 2 hours PRN  ondansetron Injectable 4 milliGRAM(s) IV Push every 6 hours PRN  propofol Infusion 10 MICROgram(s)/kG/Min IV Continuous <Continuous>    Heme:  heparin   Injectable 5000 Unit(s) SubCutaneous every 12 hours    Antibiotics:  piperacillin/tazobactam IVPB.. 3.375 Gram(s) IV Intermittent every 8 hours    Cardiovascular:  tamsulosin 0.4 milliGRAM(s) Oral at bedtime    GI:  pantoprazole    Tablet 40 milliGRAM(s) Oral before breakfast  polyethylene glycol 3350 17 Gram(s) Oral daily  senna 2 Tablet(s) Oral at bedtime    Endocrine:  atorvastatin 10 milliGRAM(s) Oral at bedtime    All Other Medications:  chlorhexidine 0.12% Liquid 15 milliLiter(s) Oral Mucosa every 12 hours  lactated ringers. 1000 milliLiter(s) IV Continuous <Continuous>  naloxone Injectable 0.1 milliGRAM(s) IV Push every 3 minutes PRN  potassium phosphate / sodium phosphate Powder (PHOS-NaK) 1 Packet(s) Oral once  timolol 0.5% Solution 1 Drop(s) Both EYES every 12 hours      Vital Signs Last 24 Hrs  T(C): 36.4 (01 Sep 2020 04:00), Max: 36.8 (01 Sep 2020 00:00)  T(F): 97.5 (01 Sep 2020 04:00), Max: 98.2 (01 Sep 2020 00:00)  HR: 65 (01 Sep 2020 07:02) (65 - 97)  BP: 166/84 (31 Aug 2020 17:15) (142/60 - 166/84)  BP(mean): 104 (31 Aug 2020 17:15) (104 - 104)  RR: 12 (01 Sep 2020 07:00) (12 - 25)  SpO2: 100% (01 Sep 2020 07:02) (95% - 100%)    PAIN SCORE:       (x) see chart           Physical Exam: Patient is currently intubated.    LABS:                          11.0   7.58  )-----------( 186      ( 01 Sep 2020 03:30 )             32.3     09-01    131<L>  |  98  |  13  ----------------------------<  153<H>  4.1   |  22  |  0.67    Ca    7.8<L>      01 Sep 2020 03:30  Phos  3.9     09-01  Mg     2.3     09-01    TPro  5.8<L>  /  Alb  3.1<L>  /  TBili  1.6<H>  /  DBili  x   /  AST  23  /  ALT  30  /  AlkPhos  67  08-31    PT/INR - ( 31 Aug 2020 06:31 )   PT: 13.1 SEC;   INR: 1.15          PTT - ( 31 Aug 2020 06:31 )  PTT:26.3 SEC    [x ]  [place]  Reviewed and no medications found    Impression/Plan: Patient is currently intubated, and will have a bronchoscopy today.  Discussed patient with Dr. Pruitt, patient might be extubated today and will need IV PCA.  Agree with plan to begin Hydromorphone IV PCA, if patient is deemed appropriate. IV PCA orders on hold and will reevaluate patient by Pain Service later today.    Patient was seen 09-01-20 @ 08:54

## 2020-09-01 NOTE — PROCEDURE NOTE - ADDITIONAL PROCEDURE DETAILS
mild bloody oozing noted from superior segment bronchus of LLL
normal pacemaker function   sensing and pacing well   good impedance  thresholds measured via iterative testing   Two brief episodes of NSVT (7/8 and 7/17)  Reprogramming as above   Report in chart

## 2020-09-01 NOTE — PROGRESS NOTE ADULT - SUBJECTIVE AND OBJECTIVE BOX
Anesthesia Pain Management Service    SUBJECTIVE: Pt doing well with IV PCA without problems reported.    Therapy:	  [ X] IV PCA	   [ ] Epidural           [ ] s/p Spinal Opoid              [ ] Postpartum infusion	  [ ] Patient controlled regional anesthesia (PCRA)    [ ] prn Analgesics    Allergies    fish (Other (Mild))  No Known Drug Allergies  shellfish (Other (Mild))    Intolerances      MEDICATIONS  (STANDING):  acetaminophen  IVPB .. 1000 milliGRAM(s) IV Intermittent once  acetaminophen  IVPB .. 1000 milliGRAM(s) IV Intermittent once  atorvastatin 10 milliGRAM(s) Oral at bedtime  heparin   Injectable 5000 Unit(s) SubCutaneous every 12 hours  HYDROmorphone PCA (1 mG/mL) 30 milliLiter(s) PCA Continuous PCA Continuous  lactated ringers. 1000 milliLiter(s) (75 mL/Hr) IV Continuous <Continuous>  pantoprazole    Tablet 40 milliGRAM(s) Oral before breakfast  piperacillin/tazobactam IVPB.. 3.375 Gram(s) IV Intermittent every 8 hours  polyethylene glycol 3350 17 Gram(s) Oral daily  potassium phosphate / sodium phosphate Powder (PHOS-NaK) 1 Packet(s) Oral once  propofol Infusion 10 MICROgram(s)/kG/Min (4.52 mL/Hr) IV Continuous <Continuous>  senna 2 Tablet(s) Oral at bedtime  tamsulosin 0.4 milliGRAM(s) Oral at bedtime  timolol 0.5% Solution 1 Drop(s) Both EYES every 12 hours    MEDICATIONS  (PRN):  acetaminophen   Tablet .. 650 milliGRAM(s) Oral every 6 hours PRN Mild Pain (1 - 3)  HYDROmorphone PCA (1 mG/mL) Rescue Clinician Bolus 0.4 milliGRAM(s) IV Push every 15 minutes PRN for Pain Scale GREATER THAN 6  naloxone Injectable 0.1 milliGRAM(s) IV Push every 3 minutes PRN For ANY of the following changes in patient status:  A. RR LESS THAN 10 breaths per minute, B. Oxygen saturation LESS THAN 90%, C. Sedation score of 6  ondansetron Injectable 4 milliGRAM(s) IV Push every 6 hours PRN Nausea      OBJECTIVE:   [X] No new signs     [ ] Other:    Side Effects:  [X ] None			[ ] Other:    Assessment of Catheter Site:		[ ] Intact		[ ] Other:    ASSESSMENT/PLAN  [ X] Continue current therapy. Recommend non-opioid adjuvant analgesics to be used when possible and when allowed by primary surgical team.    [ ] Therapy changed to:    [ ] IV PCA       [ ] Epidural     [ ] prn Analgesics     Comments:    Progress Note written now but Patient was seen earlier.

## 2020-09-01 NOTE — PROGRESS NOTE ADULT - SUBJECTIVE AND OBJECTIVE BOX
DOMENIC HAN      85y   Male   MRN-[id number]         fish (Other (Mild))  No Known Drug Allergies  shellfish (Other (Mild))             Daily     Daily Weight in k.6 (01 Sep 2020 04:00)Drug Dosing Weight  Height (cm): 172.72 (25 Aug 2020 13:10)  Weight (kg): 75.3 (31 Aug 2020 14:06)  BMI (kg/m2): 25.2 (31 Aug 2020 14:06)  BSA (m2): 1.89 (31 Aug 2020 14:06)      85  year old male presents to presurgical testing with diagnosis of malignant neoplasm of unspecified part of left bronchus or lung scheduled for left video assisted thoracoscopy robotic assisted left upper lobectomy mediastinal lymph node dissection. 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 (18 Aug 2020 14:08)      Procedure: Flexible bronchoscopy, left robotic assisted VATS, pneumolysis, left upper lobectomy, partial pleurectomy and mediastinal lymph node dissection 20       Bronchoscopy, left VATS, lung hematoma seen with no active bleeding, pleural effusion sent for culture, bronchoscopy post-procedure showed mild bleeding from left mainstem  20    Issues:  Respiratory failure  Lung cancer  Left lung atelectasis  Postop pain  HTN  HLD  BPH  Hx of TB                 Home Medications:  aspirin 81 mg oral tablet: 1 tab(s) orally once a day (25 Aug 2020 13:41)  atenolol 100 mg oral tablet: 1 tab(s) orally once a day (25 Aug 2020 13:41)  omeprazole 40 mg oral delayed release capsule: 1 cap(s) orally once a day (25 Aug 2020 13:41)  pravastatin 40 mg oral tablet: 1 tab(s) orally once a day (25 Aug 2020 13:41)  tamsulosin 0.4 mg oral capsule: 1 cap(s) orally once a day (25 Aug 2020 13:41)  timolol hemihydrate 0.5% ophthalmic solution: 1 drop(s) to each affected eye 2 times a day (25 Aug 2020 13:41)  Vitamin B Complex 100: 1 tab(s) orally once a day (25 Aug 2020 13:41)      PAST MEDICAL & SURGICAL HISTORY:  Glaucoma  Tuberculosis  Dementia  Arthritis  History of BPH  Malignant neoplasm of unspecified part of left bronchus or lung  Hyperlipidemia  Hypertension  History of lung biopsy  Pacemaker: [employer]  model [id number]  serial [id number]  date 2014  S/P total knee replacement, left        Vital Signs Last 24 Hrs  T(C): 36.6 (01 Sep 2020 08:00), Max: 36.8 (01 Sep 2020 00:00)  T(F): 97.8 (01 Sep 2020 08:00), Max: 98.2 (01 Sep 2020 00:00)  HR: 65 (01 Sep 2020 11:05) (65 - 97)  BP: 166/84 (31 Aug 2020 17:15) (142/60 - 166/84)  BP(mean): 104 (31 Aug 2020 17:15) (104 - 104)  RR: 12 (01 Sep 2020 11:00) (12 - 25)  SpO2: 100% (01 Sep 2020 11:05) (95% - 100%)  I&O's Detail    31 Aug 2020 07:01  -  01 Sep 2020 07:00  --------------------------------------------------------  IN:    IV PiggyBack: 450 mL    lactated ringers.: 990 mL    Oral Fluid: 160 mL    propofol Infusion: 204.3 mL  Total IN: 1804.3 mL    OUT:    Chest Tube: 205 mL    Chest Tube: 110 mL    Indwelling Catheter - Urethral: 1190 mL  Total OUT: 1505 mL    Total NET: 299.3 mL      01 Sep 2020 07:01  -  01 Sep 2020 11:55  --------------------------------------------------------  IN:    lactated ringers.: 375 mL    propofol Infusion: 45.2 mL  Total IN: 420.2 mL    OUT:    Chest Tube: 90 mL    Chest Tube: 25 mL    Indwelling Catheter - Urethral: 400 mL  Total OUT: 515 mL    Total NET: -94.8 mL        CAPILLARY BLOOD GLUCOSE          Home Medications:  aspirin 81 mg oral tablet: 1 tab(s) orally once a day (25 Aug 2020 13:41)  atenolol 100 mg oral tablet: 1 tab(s) orally once a day (25 Aug 2020 13:41)  omeprazole 40 mg oral delayed release capsule: 1 cap(s) orally once a day (25 Aug 2020 13:41)  pravastatin 40 mg oral tablet: 1 tab(s) orally once a day (25 Aug 2020 13:41)  tamsulosin 0.4 mg oral capsule: 1 cap(s) orally once a day (25 Aug 2020 13:41)  timolol hemihydrate 0.5% ophthalmic solution: 1 drop(s) to each affected eye 2 times a day (25 Aug 2020 13:41)  Vitamin B Complex 100: 1 tab(s) orally once a day (25 Aug 2020 13:41)      MEDICATIONS  (STANDING):  acetaminophen  IVPB .. 1000 milliGRAM(s) IV Intermittent once  acetaminophen  IVPB .. 1000 milliGRAM(s) IV Intermittent once  atorvastatin 10 milliGRAM(s) Oral at bedtime  chlorhexidine 0.12% Liquid 15 milliLiter(s) Oral Mucosa every 12 hours  heparin   Injectable 5000 Unit(s) SubCutaneous every 12 hours  lactated ringers. 1000 milliLiter(s) (75 mL/Hr) IV Continuous <Continuous>  pantoprazole    Tablet 40 milliGRAM(s) Oral before breakfast  piperacillin/tazobactam IVPB.. 3.375 Gram(s) IV Intermittent every 8 hours  polyethylene glycol 3350 17 Gram(s) Oral daily  potassium phosphate / sodium phosphate Powder (PHOS-NaK) 1 Packet(s) Oral once  propofol Infusion 10 MICROgram(s)/kG/Min (4.52 mL/Hr) IV Continuous <Continuous>  senna 2 Tablet(s) Oral at bedtime  tamsulosin 0.4 milliGRAM(s) Oral at bedtime  timolol 0.5% Solution 1 Drop(s) Both EYES every 12 hours    MEDICATIONS  (PRN):  acetaminophen   Tablet .. 650 milliGRAM(s) Oral every 6 hours PRN Mild Pain (1 - 3)  fentaNYL    Injectable 25 MICROGram(s) IV Push every 2 hours PRN Moderate Pain (4 - 6)  naloxone Injectable 0.1 milliGRAM(s) IV Push every 3 minutes PRN For ANY of the following changes in patient status:  A. RR LESS THAN 10 breaths per minute, B. Oxygen saturation LESS THAN 90%, C. Sedation score of 6  ondansetron Injectable 4 milliGRAM(s) IV Push every 6 hours PRN Nausea      Mode: AC/ CMV (Assist Control/ Continuous Mandatory Ventilation)  RR (machine): 12  TV (machine): 500  FiO2: 40  PEEP: 5  MAP: 8  PIP: 21      Physical exam:     General:               Pt appears to be in  pain but not in  distress                                                  Neuro:                  Nonfocal                             Cardiovascular:   S1 & S2, regular                           Respiratory:         Air entry is fair on the right side, decreased on the left side, has bilateral conducted sounds                           GI:                          Soft, nondistended and nontender, Bowel sounds active                            Ext:                        No cyanosis or edema                Labs:                                                                           11.0   7.58  )-----------( 186      ( 01 Sep 2020 03:30 )             32.3             09-01    131<L>  |  98  |  13  ----------------------------<  153<H>  4.1   |  22  |  0.67    Ca    7.8<L>      01 Sep 2020 03:30  Phos  3.9     09-  Mg     2.3     09-01    TPro  5.8<L>  /  Alb  3.1<L>  /  TBili  1.6<H>  /  DBili  x   /  AST  23  /  ALT  30  /  AlkPhos  67  08-31                  PT/INR - ( 31 Aug 2020 06:31 )   PT: 13.1 SEC;   INR: 1.15          PTT - ( 31 Aug 2020 06:31 )  PTT:26.3 SEC  LIVER FUNCTIONS - ( 31 Aug 2020 18:02 )  Alb: 3.1 g/dL / Pro: 5.8 g/dL / ALK PHOS: 67 u/L / ALT: 30 u/L / AST: 23 u/L / GGT: x         Transcutaneous Bilirubin        Culture - Fungal, Body Fluid (collected 31 Aug 2020 18:32)  Source: .Body Fluid LEFT PLEURAL EFFUSION  Preliminary Report (01 Sep 2020 09:03):    Testing in progress    Culture - Body Fluid with Gram Stain (collected 31 Aug 2020 18:32)  Source: .Body Fluid LEFT PLEURAL EFFUSION  Gram Stain (31 Aug 2020 21:29):    Rare polymorphonuclear leukocytes per low power field    No organisms seen per oil power field    Culture - Sputum (collected 31 Aug 2020 18:26)  Source: .Sputum Sputum  Gram Stain (31 Aug 2020 20:35):    Few polymorphonuclear leukocytes per low power field    Moderate Squamous epithelial cells per low power field    Few Gram Negative Rods per oil power field    Few Gram positive cocci in pairs per oil power field    Few Yeast like cells per oil power field    Culture - Fungal, Bronchial (collected 31 Aug 2020 18:23)  Source: .Bronchial left lower Bronchoalveolar lavage  Preliminary Report (01 Sep 2020 08:33):    Testing in progress    Culture - Bronchial (collected 31 Aug 2020 18:23)  Source: .Bronchial left lower Bronchoalveolar lavage  Gram Stain (31 Aug 2020 20:34):    No squamous epithelial cells per low power field    No polymorphonuclear cells seen per low power field    No organisms seen per oil power field      CXR:  < from: Xray Chest 1 View- PORTABLE-Routine (20 @ 01:21) >  Since the last exam, left thoracotomy has been performed and 2 left-sided chest tubes are now present as well as an endotracheal right lung remains clear. Loss of volume in the left lung with subcutaneous emphysema in the left chest and neck. No pneumothorax.     at 12:32 AM:  The endotracheal and 2 left-sided chest tubes are unchanged. Increasing opacity of the left hemithorax post thoracotomy may represent developing postop atelectasis/effusion. No pneumothorax and the right lung remains clear. Heart size appears stable.      COMPARISON:   at 1:55 PM      IMPRESSION:  Status post left thoracotomy with chest tubes.          Plan: 85yMale s/p Flexible bronchoscopy, left robotic assisted VATS, pneumolysis, left upper lobectomy, partial pleurectomy and mediastinal lymph node dissection 20. Postop course was complicated by complete opacification of left lung requiring RTOR / VATS / Bronch. Pt is currently intubated. Bronch this AM shows small amount of fresh blood in the airway but no active bleeding                             Neuro:                                         Pain control with   Tylenol / Dilaudid                            Cardiovascular:                                          Continue hemodynamic monitoring.    HTN: Restart Atenolol after extubation    HLD: Restart Lipitor                             Respiratory:                                         Pt is on full mechanical vent support, plan CPAP wean and extubation today     Atelectasis: Bronched today. Continue 3% Saline inhalations / pulmozyme after extubation                                         Appears to be in pain, not in any distress.                                         Encourage incentive spirometry                                          Monitor chest tube output                                         Chest tube to water seal, has air leak.                                                                  Hx of TB: Continue airborne isolation                            GI                                         NPO     GI prophylaxis with Protonix                                         Continue Zofran / Reglan for nausea - PRN	                                                                 Renal:                                         Continue LR   30cc/hr                                         Monitor I/Os and electrolytes                                         BPH:  Flomax after extubation                                                 Hem/ Onc:                                                                                  Monitor chest tube output &  signs of bleeding.                                          CBC now &  in AM                           Infectious disease:      Hx of TB: Continue airborne isolation                                          Monitor for fever / leukocytosis.                                          All surgical incision / chest tube  sites look clean.                             Endocrine                                             Continue Accu-Checks with coverage.     Pt is on SQ Heparin and Venodyne boots for DVT prophylaxis.     Pertinent clinical, laboratory, radiographic, hemodynamic, echocardiographic, respiratory data, microbiologic data and chart were reviewed and analyzed frequently throughout the course of the day and night  Patient seen, examined and plan discussed with CT Surgeon Dr. Streeter  / CTICU team.      I have spent 70 minutes of critical care time with this patient between 7am and 11.59pm        Anibal Pruitt MD

## 2020-09-01 NOTE — PROVIDER CONTACT NOTE (OTHER) - SITUATION
Pt s/p VATS with 2 chest tubes; Dressing around chest tube site had become saturated with blood from 7p-1am during my shift

## 2020-09-01 NOTE — CHART NOTE - NSCHARTNOTEFT_GEN_A_CORE
85 year old male with diagnosis of malignant neoplasm of lung underwent left video assisted thoracoscopy, robotic assisted left upper lobectomy and mediastinal lymph node dissection on 8/25/20. Patient was kept in respiratory isolation for “history of untreated TB”.  As per “infection control” discussion with the PCP Dr. Fink and family, patient did not have TB at any time or ever treated.   Patient will be removed from respiratory isolation as suggested by hospital infection control

## 2020-09-01 NOTE — PROGRESS NOTE ADULT - SUBJECTIVE AND OBJECTIVE BOX
POST ANESTHESIA EVALUATION    85y Male POSTOP DAY 1 S/P Left VATS    MENTAL STATUS: Patient participation [  ] Awake     [  ] Arousable     [ x ] Sedated    AIRWAY PATENCY: [  ] Satisfactory  [ x ] Other: Intubated    Vital Signs Last 24 Hrs  T(C): 36.3 (01 Sep 2020 12:00), Max: 36.8 (01 Sep 2020 00:00)  T(F): 97.3 (01 Sep 2020 12:00), Max: 98.2 (01 Sep 2020 00:00)  HR: 65 (01 Sep 2020 12:00) (65 - 97)  BP: 166/84 (31 Aug 2020 17:15) (142/60 - 166/84)  BP(mean): 104 (31 Aug 2020 17:15) (104 - 104)  RR: 18 (01 Sep 2020 12:00) (12 - 25)  SpO2: 100% (01 Sep 2020 12:00) (95% - 100%)  I&O's Summary    31 Aug 2020 07:01  -  01 Sep 2020 07:00  --------------------------------------------------------  IN: 1804.3 mL / OUT: 1505 mL / NET: 299.3 mL    01 Sep 2020 07:01  -  01 Sep 2020 12:58  --------------------------------------------------------  IN: 420.2 mL / OUT: 515 mL / NET: -94.8 mL          NAUSEA/ VOMITTING:  [x  ] NONE  [  ] CONTROLLED [  ] OTHER     PAIN: [x  ] CONTROLLED WITH CURRENT REGIMEN  [  ] OTHER    [x  ] NO APPARENT ANESTHESIA COMPLICATIONS      Comments:

## 2020-09-01 NOTE — PROVIDER CONTACT NOTE (OTHER) - ASSESSMENT
Dressing around chest tube site had become saturated with blood from 7p-1am during my shift. No crepitus at chest tube site upon palpation

## 2020-09-02 LAB
ALBUMIN SERPL ELPH-MCNC: 2.7 G/DL — LOW (ref 3.3–5)
ALP SERPL-CCNC: 52 U/L — SIGNIFICANT CHANGE UP (ref 40–120)
ALT FLD-CCNC: 26 U/L — SIGNIFICANT CHANGE UP (ref 4–41)
ANION GAP SERPL CALC-SCNC: 10 MMO/L — SIGNIFICANT CHANGE UP (ref 7–14)
AST SERPL-CCNC: 16 U/L — SIGNIFICANT CHANGE UP (ref 4–40)
BASOPHILS # BLD AUTO: 0.02 K/UL — SIGNIFICANT CHANGE UP (ref 0–0.2)
BASOPHILS NFR BLD AUTO: 0.3 % — SIGNIFICANT CHANGE UP (ref 0–2)
BILIRUB SERPL-MCNC: 1 MG/DL — SIGNIFICANT CHANGE UP (ref 0.2–1.2)
BUN SERPL-MCNC: 13 MG/DL — SIGNIFICANT CHANGE UP (ref 7–23)
CALCIUM SERPL-MCNC: 7.4 MG/DL — LOW (ref 8.4–10.5)
CHLORIDE SERPL-SCNC: 102 MMOL/L — SIGNIFICANT CHANGE UP (ref 98–107)
CO2 SERPL-SCNC: 24 MMOL/L — SIGNIFICANT CHANGE UP (ref 22–31)
CREAT SERPL-MCNC: 0.8 MG/DL — SIGNIFICANT CHANGE UP (ref 0.5–1.3)
CULTURE RESULTS: SIGNIFICANT CHANGE UP
EOSINOPHIL # BLD AUTO: 0.04 K/UL — SIGNIFICANT CHANGE UP (ref 0–0.5)
EOSINOPHIL NFR BLD AUTO: 0.5 % — SIGNIFICANT CHANGE UP (ref 0–6)
GLUCOSE SERPL-MCNC: 110 MG/DL — HIGH (ref 70–99)
HCT VFR BLD CALC: 31.6 % — LOW (ref 39–50)
HGB BLD-MCNC: 11 G/DL — LOW (ref 13–17)
IMM GRANULOCYTES NFR BLD AUTO: 1.4 % — SIGNIFICANT CHANGE UP (ref 0–1.5)
LYMPHOCYTES # BLD AUTO: 0.99 K/UL — LOW (ref 1–3.3)
LYMPHOCYTES # BLD AUTO: 12.7 % — LOW (ref 13–44)
MAGNESIUM SERPL-MCNC: 2.2 MG/DL — SIGNIFICANT CHANGE UP (ref 1.6–2.6)
MCHC RBC-ENTMCNC: 32.3 PG — SIGNIFICANT CHANGE UP (ref 27–34)
MCHC RBC-ENTMCNC: 34.8 % — SIGNIFICANT CHANGE UP (ref 32–36)
MCV RBC AUTO: 92.7 FL — SIGNIFICANT CHANGE UP (ref 80–100)
MONOCYTES # BLD AUTO: 0.69 K/UL — SIGNIFICANT CHANGE UP (ref 0–0.9)
MONOCYTES NFR BLD AUTO: 8.9 % — SIGNIFICANT CHANGE UP (ref 2–14)
NEUTROPHILS # BLD AUTO: 5.93 K/UL — SIGNIFICANT CHANGE UP (ref 1.8–7.4)
NEUTROPHILS NFR BLD AUTO: 76.2 % — SIGNIFICANT CHANGE UP (ref 43–77)
NRBC # FLD: 0 K/UL — SIGNIFICANT CHANGE UP (ref 0–0)
PHOSPHATE SERPL-MCNC: 2.5 MG/DL — SIGNIFICANT CHANGE UP (ref 2.5–4.5)
PLATELET # BLD AUTO: 202 K/UL — SIGNIFICANT CHANGE UP (ref 150–400)
PMV BLD: 9.6 FL — SIGNIFICANT CHANGE UP (ref 7–13)
POTASSIUM SERPL-MCNC: 3.6 MMOL/L — SIGNIFICANT CHANGE UP (ref 3.5–5.3)
POTASSIUM SERPL-SCNC: 3.6 MMOL/L — SIGNIFICANT CHANGE UP (ref 3.5–5.3)
PROT SERPL-MCNC: 5.1 G/DL — LOW (ref 6–8.3)
RBC # BLD: 3.41 M/UL — LOW (ref 4.2–5.8)
RBC # FLD: 12.6 % — SIGNIFICANT CHANGE UP (ref 10.3–14.5)
SODIUM SERPL-SCNC: 136 MMOL/L — SIGNIFICANT CHANGE UP (ref 135–145)
SPECIMEN SOURCE: SIGNIFICANT CHANGE UP
WBC # BLD: 7.78 K/UL — SIGNIFICANT CHANGE UP (ref 3.8–10.5)
WBC # FLD AUTO: 7.78 K/UL — SIGNIFICANT CHANGE UP (ref 3.8–10.5)

## 2020-09-02 PROCEDURE — 71045 X-RAY EXAM CHEST 1 VIEW: CPT | Mod: 26

## 2020-09-02 PROCEDURE — 99233 SBSQ HOSP IP/OBS HIGH 50: CPT

## 2020-09-02 RX ORDER — SODIUM,POTASSIUM PHOSPHATES 278-250MG
1 POWDER IN PACKET (EA) ORAL ONCE
Refills: 0 | Status: COMPLETED | OUTPATIENT
Start: 2020-09-02 | End: 2020-09-02

## 2020-09-02 RX ORDER — POTASSIUM CHLORIDE 20 MEQ
20 PACKET (EA) ORAL ONCE
Refills: 0 | Status: COMPLETED | OUTPATIENT
Start: 2020-09-02 | End: 2020-09-02

## 2020-09-02 RX ADMIN — LIDOCAINE 1 PATCH: 4 CREAM TOPICAL at 05:30

## 2020-09-02 RX ADMIN — PIPERACILLIN AND TAZOBACTAM 25 GRAM(S): 4; .5 INJECTION, POWDER, LYOPHILIZED, FOR SOLUTION INTRAVENOUS at 05:40

## 2020-09-02 RX ADMIN — Medication 1 PACKET(S): at 10:45

## 2020-09-02 RX ADMIN — HEPARIN SODIUM 5000 UNIT(S): 5000 INJECTION INTRAVENOUS; SUBCUTANEOUS at 05:41

## 2020-09-02 RX ADMIN — Medication 400 MILLIGRAM(S): at 01:04

## 2020-09-02 RX ADMIN — SODIUM CHLORIDE 30 MILLILITER(S): 9 INJECTION, SOLUTION INTRAVENOUS at 10:45

## 2020-09-02 RX ADMIN — Medication 1 DROP(S): at 17:19

## 2020-09-02 RX ADMIN — Medication 1000 MILLIGRAM(S): at 01:34

## 2020-09-02 RX ADMIN — POLYETHYLENE GLYCOL 3350 17 GRAM(S): 17 POWDER, FOR SOLUTION ORAL at 13:00

## 2020-09-02 RX ADMIN — ATORVASTATIN CALCIUM 10 MILLIGRAM(S): 80 TABLET, FILM COATED ORAL at 22:39

## 2020-09-02 RX ADMIN — PANTOPRAZOLE SODIUM 40 MILLIGRAM(S): 20 TABLET, DELAYED RELEASE ORAL at 05:41

## 2020-09-02 RX ADMIN — PIPERACILLIN AND TAZOBACTAM 25 GRAM(S): 4; .5 INJECTION, POWDER, LYOPHILIZED, FOR SOLUTION INTRAVENOUS at 13:00

## 2020-09-02 RX ADMIN — HEPARIN SODIUM 5000 UNIT(S): 5000 INJECTION INTRAVENOUS; SUBCUTANEOUS at 17:19

## 2020-09-02 RX ADMIN — TAMSULOSIN HYDROCHLORIDE 0.4 MILLIGRAM(S): 0.4 CAPSULE ORAL at 22:38

## 2020-09-02 RX ADMIN — Medication 20 MILLIEQUIVALENT(S): at 10:45

## 2020-09-02 RX ADMIN — PIPERACILLIN AND TAZOBACTAM 25 GRAM(S): 4; .5 INJECTION, POWDER, LYOPHILIZED, FOR SOLUTION INTRAVENOUS at 22:38

## 2020-09-02 RX ADMIN — Medication 1 DROP(S): at 05:41

## 2020-09-02 NOTE — DIETITIAN INITIAL EVALUATION ADULT. - PERTINENT MEDS FT
MEDICATIONS  (STANDING):  acetaminophen  IVPB .. 1000 milliGRAM(s) IV Intermittent once  acetaminophen  IVPB .. 1000 milliGRAM(s) IV Intermittent once  atorvastatin 10 milliGRAM(s) Oral at bedtime  heparin   Injectable 5000 Unit(s) SubCutaneous every 12 hours  lactated ringers. 1000 milliLiter(s) (30 mL/Hr) IV Continuous <Continuous>  pantoprazole    Tablet 40 milliGRAM(s) Oral before breakfast  piperacillin/tazobactam IVPB.. 3.375 Gram(s) IV Intermittent every 8 hours  polyethylene glycol 3350 17 Gram(s) Oral daily  senna 2 Tablet(s) Oral at bedtime  tamsulosin 0.4 milliGRAM(s) Oral at bedtime  timolol 0.5% Solution 1 Drop(s) Both EYES every 12 hours    MEDICATIONS  (PRN):  acetaminophen   Tablet .. 650 milliGRAM(s) Oral every 6 hours PRN Mild Pain (1 - 3)  acetaminophen  IVPB .. 1000 milliGRAM(s) IV Intermittent once PRN Temp greater or equal to 38C (100.4F), Mild Pain (1 - 3)  naloxone Injectable 0.1 milliGRAM(s) IV Push every 3 minutes PRN For ANY of the following changes in patient status:  A. RR LESS THAN 10 breaths per minute, B. Oxygen saturation LESS THAN 90%, C. Sedation score of 6  ondansetron Injectable 4 milliGRAM(s) IV Push every 6 hours PRN Nausea

## 2020-09-02 NOTE — PROGRESS NOTE ADULT - SUBJECTIVE AND OBJECTIVE BOX
PROCEDURE:   -left robotic assisted VATS, pneumolysis, left upper lobectomy, partial pleurectomy and mediastinal lymph node dissection  25-Aug-2020   -bronchoscopy, left VATS, lung hematoma seen with no active bleeding pleural effusion sent for culture  31-Aug-2020   - bronchoscopy, bedside 9/1/2020      ISSUES:   Hemoptysis  Lung cancer  Post op pain  Chest tube in place  BPH  HLD  HTN  Dementia  Hx of tuberculosis (unclear history)  Glaucoma  s/p Pacemaker [employer], model [id number], serial [id number], date 7/1/2014    INTERVAL EVENTS:   No hemoptysis  Off airborne isolation  Posterior chest tube removed.   Denies hemoptysis.      HISTORY:   Patient reports moderate pain at chest wall incision sites which is worse with coughing and deep breathing without associated fever or dyspnea. Pain is improved with use of pain meds.     PHYSICAL EXAM:   Gen: Comfortable, No acute distress  Eyes: Sclera white, Conjunctiva normal, Eyelids normal, Pupils symmetrical   ENT: Mucous membranes moist,  ,  ,    Neck: Trachea midline,  ,  ,  ,  ,    CV: Rate regular, Rhythm regular,  ,  ,    Resp: Breath sounds clear, No accessory muscles use  Abd: Soft, Non-distended, Non-tender, Bowel sounds normal,  ,    Skin: Warm, No peripheral edema of lower extremities,  ,    : No merchant  Neuro: Moving all 4 extremities,    Psych: A&Ox3      ASSESSMENT AND PLAN:     NEURO:  Post-operative Pain - Pain control with oxycodone and Tylenol IV PRN.       RESPIRATORY:  Hypoxia - Wean nasal cannula for goal O2sat above 92. Obtain CXR. Incentive spirometry. Chest PT and frequent suctioning. Continue bronchodilators. OOB to chair & ambulate w/ assistance. Continuous pulse oximetry for support & to prevent decompensation.     Hemoptysis - improved. monitor volume of hemoptysis. Continue vanc and zosyn. Monitor zosyn levels.        CARDIOVASCULAR:  Hemodynamically stable - Not on pressors. Continue hemodynamic monitoring.  HTN - stable. Continue home antihypertensives for now.       RENAL:  Stable – LR IVF 30mL/hr. Monitor IOs and electrolytes.   BPH - Stable. Flomax qday      GASTROINTESTINAL:  GI prophylaxis with pantoprazole  Zofran and Reglan IV PRN for nausea  Regular consistency diet       HEMATOLOGIC:  No signs of active bleeding. Monitor Hgb in CBC in AM  DVT prophylaxis with heparin subQ and SCDs.         INFECTIOUS DISEASE:  All surgical sites appear clean. Will monitor for fever and leukocytosis.   Antibiotics for hemoptysis.      ENDOCRINE:  Stable – Monitor glucose fingersticks for goal 120-180.            Pertinent clinical, laboratory, radiographic, hemodynamic, echocardiographic, respiratory data, microbiologic data and chart were reviewed by myself and analyzed frequently throughout the course of the day and night by myself.    Plan discussed at length with the CTICU staff and Attending CT Surgeon.     _________________________  VITAL SIGNS:  Vital Signs Last 24 Hrs  T(C): 36.5 (02 Sep 2020 12:00), Max: 36.7 (02 Sep 2020 00:00)  T(F): 97.7 (02 Sep 2020 12:00), Max: 98 (02 Sep 2020 00:00)  HR: 78 (02 Sep 2020 15:00) (65 - 78)  BP: 165/69 (02 Sep 2020 15:00) (115/57 - 165/69)  BP(mean): 94 (02 Sep 2020 15:00) (70 - 94)  RR: 16 (02 Sep 2020 15:00) (13 - 21)  SpO2: 96% (02 Sep 2020 15:00) (94% - 96%)  I/Os:   I&O's Detail    01 Sep 2020 07:01  -  02 Sep 2020 07:00  --------------------------------------------------------  IN:    IV PiggyBack: 100 mL    lactated ringers.: 1800 mL    Oral Fluid: 420 mL    propofol Infusion: 45.2 mL  Total IN: 2365.2 mL    OUT:    Chest Tube: 125 mL    Chest Tube: 250 mL    Indwelling Catheter - Urethral: 1600 mL  Total OUT: 1975 mL    Total NET: 390.2 mL      02 Sep 2020 07:01  -  02 Sep 2020 15:30  --------------------------------------------------------  IN:    IV PiggyBack: 100 mL    lactated ringers.: 330 mL    Oral Fluid: 780 mL  Total IN: 1210 mL    OUT:    Chest Tube: 40 mL    Chest Tube: 95 mL    Indwelling Catheter - Urethral: 470 mL  Total OUT: 605 mL    Total NET: 605 mL              MEDICATIONS:  MEDICATIONS  (STANDING):  acetaminophen  IVPB .. 1000 milliGRAM(s) IV Intermittent once  acetaminophen  IVPB .. 1000 milliGRAM(s) IV Intermittent once  atorvastatin 10 milliGRAM(s) Oral at bedtime  heparin   Injectable 5000 Unit(s) SubCutaneous every 12 hours  lactated ringers. 1000 milliLiter(s) (30 mL/Hr) IV Continuous <Continuous>  pantoprazole    Tablet 40 milliGRAM(s) Oral before breakfast  piperacillin/tazobactam IVPB.. 3.375 Gram(s) IV Intermittent every 8 hours  tamsulosin 0.4 milliGRAM(s) Oral at bedtime  timolol 0.5% Solution 1 Drop(s) Both EYES every 12 hours    MEDICATIONS  (PRN):  acetaminophen   Tablet .. 650 milliGRAM(s) Oral every 6 hours PRN Mild Pain (1 - 3)  acetaminophen  IVPB .. 1000 milliGRAM(s) IV Intermittent once PRN Temp greater or equal to 38C (100.4F), Mild Pain (1 - 3)  naloxone Injectable 0.1 milliGRAM(s) IV Push every 3 minutes PRN For ANY of the following changes in patient status:  A. RR LESS THAN 10 breaths per minute, B. Oxygen saturation LESS THAN 90%, C. Sedation score of 6  ondansetron Injectable 4 milliGRAM(s) IV Push every 6 hours PRN Nausea      LABS:                        11.0   7.78  )-----------( 202      ( 02 Sep 2020 05:30 )             31.6     09-02    136  |  102  |  13  ----------------------------<  110<H>  3.6   |  24  |  0.80    Ca    7.4<L>      02 Sep 2020 05:30  Phos  2.5     09-02  Mg     2.2     09-02    TPro  5.1<L>  /  Alb  2.7<L>  /  TBili  1.0  /  DBili  x   /  AST  16  /  ALT  26  /  AlkPhos  52  09-02    LIVER FUNCTIONS - ( 02 Sep 2020 05:30 )  Alb: 2.7 g/dL / Pro: 5.1 g/dL / ALK PHOS: 52 u/L / ALT: 26 u/L / AST: 16 u/L / GGT: x             ABG - ( 01 Sep 2020 12:07 )  pH, Arterial: 7.47  pH, Blood: x     /  pCO2: 35    /  pO2: 150   / HCO3: 26    / Base Excess: 1.5   /  SaO2: 99.4                _________________________

## 2020-09-02 NOTE — PROGRESS NOTE PEDS - SUBJECTIVE AND OBJECTIVE BOX
Anesthesia Pain Management Service- Attending Addendum    SUBJECTIVE: Pt doing well with IV PCA without problems reported.    Therapy:	  [ X] IV PCA	   [ ] Epidural           [ ] s/p Spinal Opoid              [ ] Postpartum infusion	  [ ] Patient controlled regional anesthesia (PCRA)    [ ] prn Analgesics    Allergies    fish (Other (Mild))  No Known Drug Allergies  shellfish (Other (Mild))    Intolerances      MEDICATIONS  (STANDING):  acetaminophen  IVPB .. 1000 milliGRAM(s) IV Intermittent once  acetaminophen  IVPB .. 1000 milliGRAM(s) IV Intermittent once  atorvastatin 10 milliGRAM(s) Oral at bedtime  heparin   Injectable 5000 Unit(s) SubCutaneous every 12 hours  lactated ringers. 1000 milliLiter(s) (30 mL/Hr) IV Continuous <Continuous>  pantoprazole    Tablet 40 milliGRAM(s) Oral before breakfast  piperacillin/tazobactam IVPB.. 3.375 Gram(s) IV Intermittent every 8 hours  tamsulosin 0.4 milliGRAM(s) Oral at bedtime  timolol 0.5% Solution 1 Drop(s) Both EYES every 12 hours    MEDICATIONS  (PRN):  acetaminophen   Tablet .. 650 milliGRAM(s) Oral every 6 hours PRN Mild Pain (1 - 3)  acetaminophen  IVPB .. 1000 milliGRAM(s) IV Intermittent once PRN Temp greater or equal to 38C (100.4F), Mild Pain (1 - 3)  naloxone Injectable 0.1 milliGRAM(s) IV Push every 3 minutes PRN For ANY of the following changes in patient status:  A. RR LESS THAN 10 breaths per minute, B. Oxygen saturation LESS THAN 90%, C. Sedation score of 6  ondansetron Injectable 4 milliGRAM(s) IV Push every 6 hours PRN Nausea      OBJECTIVE:   [X] No new signs     [ ] Other:    Side Effects:  [X ] None			[ ] Other:    Assessment of Catheter Site:		[ ] Intact		[ ] Other:    ASSESSMENT/PLAN  [ X] Continue current therapy    [ ] Therapy changed to:    [ ] IV PCA       [ ] Epidural     [ ] prn Analgesics     Comments:

## 2020-09-02 NOTE — DIETITIAN INITIAL EVALUATION ADULT. - PERTINENT LABORATORY DATA
09-02 Na136 mmol/L Glu 110 mg/dL<H> K+ 3.6 mmol/L Cr  0.80 mg/dL BUN 13 mg/dL 09-02 Phos 2.5 mg/dL 09-02 Alb 2.7 g/dL<L>

## 2020-09-02 NOTE — DIETITIAN INITIAL EVALUATION ADULT. - ADD RECOMMEND
1. Monitor weights, labs, BM's, skin integrity, p.o. intake. 2. Add Ensure Enlive 240mls 1x daily (350 kcal, 20g protein). 3. Please Encourage po intake, assist with meals and menu selections, provide alternatives PRN. 4. RD remains available, re-consult as needed.

## 2020-09-02 NOTE — DIETITIAN INITIAL EVALUATION ADULT. - OTHER INFO
85y Male s/p Flexible bronchoscopy, left robotic assisted VATS, pneumolysis, left upper lobectomy, partial pleurectomy and mediastinal lymph node dissection 8/25/20. Postop course was complicated by complete opacification of left lung requiring RTOR / VATS / Bronch. Patient was intubated, extubated 9/2. Bronch on 9/1 shows small amount of fresh blood in the airway but no active bleeding. Patient was tolerating diet on 8 tower and became NPO on 8/31 for procedure. Yesterday started on clear liquids, was tolerating and transitioned to PO diet this afternoon. No vomiting/diarrhea or difficulty swallowing. +N on Reglan. + Constipation on bowel regimen. Last BM 9/2 +loose stool. Past weight history per HIE (1/23/20) 72.6kg. (3/12/20) 77.1kg. Admit weight (8/26/20) 67.8kg. (8/31/20) 75.3kg. Current weight (9/2/20) 82kg with no edema. Weight changes likely 2/2 fluid shifts.

## 2020-09-03 LAB
ANION GAP SERPL CALC-SCNC: 11 MMO/L — SIGNIFICANT CHANGE UP (ref 7–14)
BUN SERPL-MCNC: 12 MG/DL — SIGNIFICANT CHANGE UP (ref 7–23)
CALCIUM SERPL-MCNC: 7.9 MG/DL — LOW (ref 8.4–10.5)
CHLORIDE SERPL-SCNC: 100 MMOL/L — SIGNIFICANT CHANGE UP (ref 98–107)
CO2 SERPL-SCNC: 22 MMOL/L — SIGNIFICANT CHANGE UP (ref 22–31)
CREAT SERPL-MCNC: 0.68 MG/DL — SIGNIFICANT CHANGE UP (ref 0.5–1.3)
GLUCOSE SERPL-MCNC: 114 MG/DL — HIGH (ref 70–99)
HCT VFR BLD CALC: 32.6 % — LOW (ref 39–50)
HGB BLD-MCNC: 11.3 G/DL — LOW (ref 13–17)
MAGNESIUM SERPL-MCNC: 2.2 MG/DL — SIGNIFICANT CHANGE UP (ref 1.6–2.6)
MCHC RBC-ENTMCNC: 32.4 PG — SIGNIFICANT CHANGE UP (ref 27–34)
MCHC RBC-ENTMCNC: 34.7 % — SIGNIFICANT CHANGE UP (ref 32–36)
MCV RBC AUTO: 93.4 FL — SIGNIFICANT CHANGE UP (ref 80–100)
NRBC # FLD: 0 K/UL — SIGNIFICANT CHANGE UP (ref 0–0)
PHOSPHATE SERPL-MCNC: 2.9 MG/DL — SIGNIFICANT CHANGE UP (ref 2.5–4.5)
PLATELET # BLD AUTO: 218 K/UL — SIGNIFICANT CHANGE UP (ref 150–400)
PMV BLD: 9.2 FL — SIGNIFICANT CHANGE UP (ref 7–13)
POTASSIUM SERPL-MCNC: 3.6 MMOL/L — SIGNIFICANT CHANGE UP (ref 3.5–5.3)
POTASSIUM SERPL-SCNC: 3.6 MMOL/L — SIGNIFICANT CHANGE UP (ref 3.5–5.3)
RBC # BLD: 3.49 M/UL — LOW (ref 4.2–5.8)
RBC # FLD: 12.5 % — SIGNIFICANT CHANGE UP (ref 10.3–14.5)
SODIUM SERPL-SCNC: 133 MMOL/L — LOW (ref 135–145)
WBC # BLD: 7.51 K/UL — SIGNIFICANT CHANGE UP (ref 3.8–10.5)
WBC # FLD AUTO: 7.51 K/UL — SIGNIFICANT CHANGE UP (ref 3.8–10.5)

## 2020-09-03 PROCEDURE — 71045 X-RAY EXAM CHEST 1 VIEW: CPT | Mod: 26

## 2020-09-03 PROCEDURE — 99233 SBSQ HOSP IP/OBS HIGH 50: CPT

## 2020-09-03 RX ORDER — POTASSIUM CHLORIDE 20 MEQ
40 PACKET (EA) ORAL ONCE
Refills: 0 | Status: COMPLETED | OUTPATIENT
Start: 2020-09-03 | End: 2020-09-03

## 2020-09-03 RX ADMIN — Medication 40 MILLIEQUIVALENT(S): at 06:32

## 2020-09-03 RX ADMIN — PANTOPRAZOLE SODIUM 40 MILLIGRAM(S): 20 TABLET, DELAYED RELEASE ORAL at 06:10

## 2020-09-03 RX ADMIN — PIPERACILLIN AND TAZOBACTAM 25 GRAM(S): 4; .5 INJECTION, POWDER, LYOPHILIZED, FOR SOLUTION INTRAVENOUS at 06:10

## 2020-09-03 RX ADMIN — Medication 1 DROP(S): at 06:09

## 2020-09-03 RX ADMIN — ATORVASTATIN CALCIUM 10 MILLIGRAM(S): 80 TABLET, FILM COATED ORAL at 22:00

## 2020-09-03 RX ADMIN — HEPARIN SODIUM 5000 UNIT(S): 5000 INJECTION INTRAVENOUS; SUBCUTANEOUS at 06:10

## 2020-09-03 RX ADMIN — HEPARIN SODIUM 5000 UNIT(S): 5000 INJECTION INTRAVENOUS; SUBCUTANEOUS at 18:39

## 2020-09-03 RX ADMIN — Medication 1 DROP(S): at 22:00

## 2020-09-03 RX ADMIN — TAMSULOSIN HYDROCHLORIDE 0.4 MILLIGRAM(S): 0.4 CAPSULE ORAL at 22:00

## 2020-09-03 NOTE — PROVIDER CONTACT NOTE (OTHER) - ACTION/TREATMENT ORDERED:
Continue to monitor
Provider notified; no further intervention at this time.
Stat CBC ordered/done. Hb and Hct stable at 11.9 and 35.0  Stat x ray ordered/done. Imaging showed unchanged volume loss and tube in expected location.   drainage serosang. may be residual blood. pt
continue to monitor

## 2020-09-04 LAB
ANION GAP SERPL CALC-SCNC: 12 MMO/L — SIGNIFICANT CHANGE UP (ref 7–14)
BUN SERPL-MCNC: 9 MG/DL — SIGNIFICANT CHANGE UP (ref 7–23)
CALCIUM SERPL-MCNC: 8.3 MG/DL — LOW (ref 8.4–10.5)
CHLORIDE SERPL-SCNC: 101 MMOL/L — SIGNIFICANT CHANGE UP (ref 98–107)
CO2 SERPL-SCNC: 23 MMOL/L — SIGNIFICANT CHANGE UP (ref 22–31)
CREAT SERPL-MCNC: 0.72 MG/DL — SIGNIFICANT CHANGE UP (ref 0.5–1.3)
GLUCOSE SERPL-MCNC: 122 MG/DL — HIGH (ref 70–99)
HCT VFR BLD CALC: 38.1 % — LOW (ref 39–50)
HGB BLD-MCNC: 12.3 G/DL — LOW (ref 13–17)
MAGNESIUM SERPL-MCNC: 2.1 MG/DL — SIGNIFICANT CHANGE UP (ref 1.6–2.6)
MCHC RBC-ENTMCNC: 30.7 PG — SIGNIFICANT CHANGE UP (ref 27–34)
MCHC RBC-ENTMCNC: 32.3 % — SIGNIFICANT CHANGE UP (ref 32–36)
MCV RBC AUTO: 95 FL — SIGNIFICANT CHANGE UP (ref 80–100)
NRBC # FLD: 0 K/UL — SIGNIFICANT CHANGE UP (ref 0–0)
PHOSPHATE SERPL-MCNC: 2.8 MG/DL — SIGNIFICANT CHANGE UP (ref 2.5–4.5)
PLATELET # BLD AUTO: 224 K/UL — SIGNIFICANT CHANGE UP (ref 150–400)
PMV BLD: 9.2 FL — SIGNIFICANT CHANGE UP (ref 7–13)
POTASSIUM SERPL-MCNC: 4.3 MMOL/L — SIGNIFICANT CHANGE UP (ref 3.5–5.3)
POTASSIUM SERPL-SCNC: 4.3 MMOL/L — SIGNIFICANT CHANGE UP (ref 3.5–5.3)
RBC # BLD: 4.01 M/UL — LOW (ref 4.2–5.8)
RBC # FLD: 12.4 % — SIGNIFICANT CHANGE UP (ref 10.3–14.5)
SODIUM SERPL-SCNC: 136 MMOL/L — SIGNIFICANT CHANGE UP (ref 135–145)
WBC # BLD: 7.78 K/UL — SIGNIFICANT CHANGE UP (ref 3.8–10.5)
WBC # FLD AUTO: 7.78 K/UL — SIGNIFICANT CHANGE UP (ref 3.8–10.5)

## 2020-09-04 PROCEDURE — 71045 X-RAY EXAM CHEST 1 VIEW: CPT | Mod: 26

## 2020-09-04 RX ADMIN — Medication 1 DROP(S): at 17:02

## 2020-09-04 RX ADMIN — HEPARIN SODIUM 5000 UNIT(S): 5000 INJECTION INTRAVENOUS; SUBCUTANEOUS at 05:28

## 2020-09-04 RX ADMIN — PANTOPRAZOLE SODIUM 40 MILLIGRAM(S): 20 TABLET, DELAYED RELEASE ORAL at 05:28

## 2020-09-04 RX ADMIN — HEPARIN SODIUM 5000 UNIT(S): 5000 INJECTION INTRAVENOUS; SUBCUTANEOUS at 17:01

## 2020-09-04 RX ADMIN — TAMSULOSIN HYDROCHLORIDE 0.4 MILLIGRAM(S): 0.4 CAPSULE ORAL at 21:59

## 2020-09-04 RX ADMIN — ATORVASTATIN CALCIUM 10 MILLIGRAM(S): 80 TABLET, FILM COATED ORAL at 21:58

## 2020-09-04 RX ADMIN — Medication 1 DROP(S): at 05:28

## 2020-09-04 NOTE — PROGRESS NOTE ADULT - SUBJECTIVE AND OBJECTIVE BOX
Subjective: Pt resting well in bed this AM. Overnight, pt started coughing up bright red blood again. This morning, denies fever/chills, CP, SOB, nausea, vomiting, and abd pain. Otherwise no concerns this AM.    Vital Signs:  Vital Signs Last 24 Hrs  T(C): 36.8 (09-04-20 @ 04:00), Max: 37.2 (09-03-20 @ 20:08)  T(F): 98.2 (09-04-20 @ 04:00), Max: 98.9 (09-03-20 @ 20:08)  HR: 66 (09-04-20 @ 00:00) (62 - 66)  BP: 141/66 (09-04-20 @ 04:00) (132/57 - 153/58)  RR: 16 (09-04-20 @ 04:00) (16 - 23)  SpO2: 95% (09-04-20 @ 04:00) (94% - 97%) on (O2)    Telemetry/Alarms: sinus rhythm  General: WN/WD NAD  Neurology: Awake, nonfocal, IRVIN x 4  Eyes: Scleras clear, PERRLA/ EOMI, Gross vision intact  ENT: Gross hearing intact, grossly patent pharynx, no stridor  Neck: Neck supple, trachea midline, No JVD,   Respiratory: Decreased breath sounds over the L lung bases but improved, No wheezing, rales, rhonchi  CV: RRR, S1S2, no murmurs, rubs or gallops  Abdominal: Soft, NT, ND +BS,   Extremities: No edema, + peripheral pulses  Skin: No Rashes, Hematoma, Ecchymosis  Lymphatic: No Neck, axilla, groin LAD  Psych: Oriented x 3, normal affect  Incisions: incision sites are clear, dry, and intact  Tubes: Anterior ainsley: 270 cc/24hours, waterseal, no airleak  Relevant labs, radiology and Medications reviewed. CXR - L side improving                        12.3   7.78  )-----------( 224      ( 04 Sep 2020 04:30 )             38.1     09-04    136  |  101  |  9   ----------------------------<  122<H>  4.3   |  23  |  0.72    Ca    8.3<L>      04 Sep 2020 04:30  Phos  2.8     09-04  Mg     2.1     09-04        MEDICATIONS  (STANDING):  acetaminophen  IVPB .. 1000 milliGRAM(s) IV Intermittent once  acetaminophen  IVPB .. 1000 milliGRAM(s) IV Intermittent once  atorvastatin 10 milliGRAM(s) Oral at bedtime  heparin   Injectable 5000 Unit(s) SubCutaneous every 12 hours  pantoprazole    Tablet 40 milliGRAM(s) Oral before breakfast  tamsulosin 0.4 milliGRAM(s) Oral at bedtime  timolol 0.5% Solution 1 Drop(s) Both EYES every 12 hours    MEDICATIONS  (PRN):  acetaminophen   Tablet .. 650 milliGRAM(s) Oral every 6 hours PRN Mild Pain (1 - 3)  acetaminophen  IVPB .. 1000 milliGRAM(s) IV Intermittent once PRN Temp greater or equal to 38C (100.4F), Mild Pain (1 - 3)  naloxone Injectable 0.1 milliGRAM(s) IV Push every 3 minutes PRN For ANY of the following changes in patient status:  A. RR LESS THAN 10 breaths per minute, B. Oxygen saturation LESS THAN 90%, C. Sedation score of 6  ondansetron Injectable 4 milliGRAM(s) IV Push every 6 hours PRN Nausea    Pertinent Physical Exam  I&O's Summary    03 Sep 2020 07:01  -  04 Sep 2020 07:00  --------------------------------------------------------  IN: 60 mL / OUT: 1670 mL / NET: -1610 mL    Assessment  85y Male  w/ PAST MEDICAL & SURGICAL HISTORY:  Glaucoma  Tuberculosis  Dementia  Arthritis  History of BPH  Malignant neoplasm of unspecified part of left bronchus or lung  Hyperlipidemia  Hypertension  History of lung biopsy  Pacemaker: [employer]  model [id number]  serial [id number]  date 7/1/2014  S/P total knee replacement, left    85 M s/p Flexible bronchoscopy, left robotic assisted VATS, pneumolysis, left upper lobectomy, partial pleurectomy and mediastinal lymph node dissection 8/25/20 , experiencing pain with deep breathing. Thick bloody sputum continued for several days requiring L VATS that demonstrated intraparenchymal hematoma. NGTD on cultures, stopped zosyn, and stopped ASA. Will continue Chest PT to continue pt for discharge. CXR improving.    PLAN  Neuro: Pain management  Pulm: Encourage coughing, deep breathing and use of incentive spirometry. Wean off supplemental oxygen as able. Daily CXR. Continue Chest PT, bronchodilators. Pt is continuing to cough up blood  Cardio: Monitor telemetry/alarms  GI: Tolerating diet. Continue stool softeners.  Renal: monitor urine output, supplement electrolytes as needed  Vasc: Heparin SC/SCDs for DVT prophylaxis  Heme: Stable H/H  ID: Stopped zosyn, stopped vanc.  Therapy: OOB/ambulate  Disposition: Aim to D/C to rehab facility when stable for discharge  Discussed with Cardiothoracic Team at AM rounds.    Gt Berkowitz, PGY-1  Thoracic Surgery  page [phone]

## 2020-09-05 LAB
ANION GAP SERPL CALC-SCNC: 13 MMO/L — SIGNIFICANT CHANGE UP (ref 7–14)
BLD GP AB SCN SERPL QL: NEGATIVE — SIGNIFICANT CHANGE UP
BUN SERPL-MCNC: 13 MG/DL — SIGNIFICANT CHANGE UP (ref 7–23)
CALCIUM SERPL-MCNC: 8.1 MG/DL — LOW (ref 8.4–10.5)
CHLORIDE SERPL-SCNC: 101 MMOL/L — SIGNIFICANT CHANGE UP (ref 98–107)
CO2 SERPL-SCNC: 21 MMOL/L — LOW (ref 22–31)
CREAT SERPL-MCNC: 0.72 MG/DL — SIGNIFICANT CHANGE UP (ref 0.5–1.3)
GLUCOSE SERPL-MCNC: 102 MG/DL — HIGH (ref 70–99)
HCT VFR BLD CALC: 34 % — LOW (ref 39–50)
HGB BLD-MCNC: 11.4 G/DL — LOW (ref 13–17)
MAGNESIUM SERPL-MCNC: 2.2 MG/DL — SIGNIFICANT CHANGE UP (ref 1.6–2.6)
MCHC RBC-ENTMCNC: 30.6 PG — SIGNIFICANT CHANGE UP (ref 27–34)
MCHC RBC-ENTMCNC: 33.5 % — SIGNIFICANT CHANGE UP (ref 32–36)
MCV RBC AUTO: 91.4 FL — SIGNIFICANT CHANGE UP (ref 80–100)
NRBC # FLD: 0 K/UL — SIGNIFICANT CHANGE UP (ref 0–0)
PHOSPHATE SERPL-MCNC: 2.7 MG/DL — SIGNIFICANT CHANGE UP (ref 2.5–4.5)
PLATELET # BLD AUTO: 210 K/UL — SIGNIFICANT CHANGE UP (ref 150–400)
PMV BLD: 9 FL — SIGNIFICANT CHANGE UP (ref 7–13)
POTASSIUM SERPL-MCNC: 3.9 MMOL/L — SIGNIFICANT CHANGE UP (ref 3.5–5.3)
POTASSIUM SERPL-SCNC: 3.9 MMOL/L — SIGNIFICANT CHANGE UP (ref 3.5–5.3)
RBC # BLD: 3.72 M/UL — LOW (ref 4.2–5.8)
RBC # FLD: 12.5 % — SIGNIFICANT CHANGE UP (ref 10.3–14.5)
RH IG SCN BLD-IMP: POSITIVE — SIGNIFICANT CHANGE UP
SODIUM SERPL-SCNC: 135 MMOL/L — SIGNIFICANT CHANGE UP (ref 135–145)
WBC # BLD: 7.23 K/UL — SIGNIFICANT CHANGE UP (ref 3.8–10.5)
WBC # FLD AUTO: 7.23 K/UL — SIGNIFICANT CHANGE UP (ref 3.8–10.5)

## 2020-09-05 PROCEDURE — 71045 X-RAY EXAM CHEST 1 VIEW: CPT | Mod: 26

## 2020-09-05 RX ADMIN — PANTOPRAZOLE SODIUM 40 MILLIGRAM(S): 20 TABLET, DELAYED RELEASE ORAL at 06:55

## 2020-09-05 RX ADMIN — TAMSULOSIN HYDROCHLORIDE 0.4 MILLIGRAM(S): 0.4 CAPSULE ORAL at 21:26

## 2020-09-05 RX ADMIN — HEPARIN SODIUM 5000 UNIT(S): 5000 INJECTION INTRAVENOUS; SUBCUTANEOUS at 06:54

## 2020-09-05 RX ADMIN — Medication 1 DROP(S): at 18:23

## 2020-09-05 RX ADMIN — HEPARIN SODIUM 5000 UNIT(S): 5000 INJECTION INTRAVENOUS; SUBCUTANEOUS at 18:23

## 2020-09-05 RX ADMIN — Medication 1 DROP(S): at 06:54

## 2020-09-05 RX ADMIN — ATORVASTATIN CALCIUM 10 MILLIGRAM(S): 80 TABLET, FILM COATED ORAL at 21:26

## 2020-09-05 NOTE — PROGRESS NOTE ADULT - SUBJECTIVE AND OBJECTIVE BOX
Subjective: Pt resting well in bed this AM. No acute events overnight. Pt is not coughing up bloody sputum this AM. Denies fever/chills, CP, SOB, nausea, vomiting, and abd pain. No other concerns from the pt at this point in time.    Vital Signs:  Vital Signs Last 24 Hrs  T(C): 37 (09-05-20 @ 06:00), Max: 37.1 (09-04-20 @ 17:00)  T(F): 98.6 (09-05-20 @ 06:00), Max: 98.8 (09-04-20 @ 17:00)  HR: 65 (09-05-20 @ 06:00) (64 - 66)  BP: 130/60 (09-05-20 @ 06:00) (130/60 - 150/64)  RR: 18 (09-05-20 @ 06:00) (16 - 18)  SpO2: 97% (09-05-20 @ 06:00) (96% - 98%) on (O2)    Telemetry/Alarms: sinus rhythm  General: WN/WD NAD  Neurology: Awake, nonfocal, IRVIN x 4  Eyes: Scleras clear, PERRLA/ EOMI, Gross vision intact  ENT: Gross hearing intact, grossly patent pharynx, no stridor  Neck: Neck supple, trachea midline, No JVD,   Respiratory: Decreased breath sounds over the L lung bases but improved, No wheezing, rales, rhonchi  CV: RRR, S1S2, no murmurs, rubs or gallops  Abdominal: Soft, NT, ND +BS,   Extremities: No edema, + peripheral pulses  Skin: No Rashes, Hematoma, Ecchymosis  Lymphatic: No Neck, axilla, groin LAD  Psych: Oriented x 3, normal affect  Incisions: incision sites are clear, dry, and intact. Removed EJ today AM.  Tubes: Anterior ainsley: 70 cc/24hours, waterseal, no airleak  Relevant labs, radiology and Medications reviewed. CXR - L side improving                        11.4   7.23  )-----------( 210      ( 05 Sep 2020 05:45 )             34.0     09-05    135  |  101  |  13  ----------------------------<  102<H>  3.9   |  21<L>  |  0.72    Ca    8.1<L>      05 Sep 2020 05:45  Phos  2.7     09-05  Mg     2.2     09-05        MEDICATIONS  (STANDING):  acetaminophen  IVPB .. 1000 milliGRAM(s) IV Intermittent once  acetaminophen  IVPB .. 1000 milliGRAM(s) IV Intermittent once  atorvastatin 10 milliGRAM(s) Oral at bedtime  heparin   Injectable 5000 Unit(s) SubCutaneous every 12 hours  pantoprazole    Tablet 40 milliGRAM(s) Oral before breakfast  tamsulosin 0.4 milliGRAM(s) Oral at bedtime  timolol 0.5% Solution 1 Drop(s) Both EYES every 12 hours    MEDICATIONS  (PRN):  acetaminophen   Tablet .. 650 milliGRAM(s) Oral every 6 hours PRN Mild Pain (1 - 3)  acetaminophen  IVPB .. 1000 milliGRAM(s) IV Intermittent once PRN Temp greater or equal to 38C (100.4F), Mild Pain (1 - 3)  naloxone Injectable 0.1 milliGRAM(s) IV Push every 3 minutes PRN For ANY of the following changes in patient status:  A. RR LESS THAN 10 breaths per minute, B. Oxygen saturation LESS THAN 90%, C. Sedation score of 6  ondansetron Injectable 4 milliGRAM(s) IV Push every 6 hours PRN Nausea    Pertinent Physical Exam  I&O's Summary    04 Sep 2020 07:01  -  05 Sep 2020 07:00  --------------------------------------------------------  IN: 175 mL / OUT: 1905 mL / NET: -1730 mL    Assessment  85y Male  w/ PAST MEDICAL & SURGICAL HISTORY:  Glaucoma  Tuberculosis  Dementia  Arthritis  History of BPH  Malignant neoplasm of unspecified part of left bronchus or lung  Hyperlipidemia  Hypertension  History of lung biopsy  Pacemaker: [employer]  model [id number]  serial [id number]  date 7/1/2014  S/P total knee replacement, left  admitted with complaints of Patient is a 85y old  Male who presents with a chief complaint of L lung neoplasm (30 Aug 2020 21:10)  .  On (Date), patient underwent Bronchoscopy, with BAL  VATS, with thoracentesis  Robot-assisted mediastinal lymphadenectomy  VATS, with partial pleurectomy  Thoracoscopic pneumolysis  Robot-assisted lobectomy of left lung with video-assisted thoracoscopic surgery (VATS)  Bronchoscopy, flexible, by CT surgery    85 M s/p Flexible bronchoscopy, left robotic assisted VATS, pneumolysis, left upper lobectomy, partial pleurectomy and mediastinal lymph node dissection 8/25/20 , experiencing pain with deep breathing. Thick bloody sputum continued for several days requiring L VATS that demonstrated intraparenchymal hematoma. NGTD on cultures, stopped zosyn, and stopped ASA. Will continue Chest PT to continue pt for discharge. CXR improving.    PLAN  Neuro: Pain management  Pulm: Encourage coughing, deep breathing and use of incentive spirometry. Wean off supplemental oxygen as able. Daily CXR. Continue Chest PT, bronchodilators. Pt is no longer coughing up blood  Cardio: Monitor telemetry/alarms  GI: Tolerating diet. Continue stool softeners.  Renal: monitor urine output, supplement electrolytes as needed  Vasc: Heparin SC/SCDs for DVT prophylaxis  Heme: Stable H/H  ID: Stopped zosyn, stopped vanc.  Therapy: OOB/ambulate  Disposition: Aim to D/C to rehab facility when stable for discharge  Discussed with Cardiothoracic Team at AM rounds.    Gt Berkowitz, PGY-1  Thoracic Surgery  page [phone]

## 2020-09-05 NOTE — PROGRESS NOTE ADULT - PROVIDER SPECIALTY LIST ADULT
Anesthesia
Anesthesia
Critical Care
Pain Medicine
Thoracic Surgery
Critical Care
Critical Care

## 2020-09-06 ENCOUNTER — TRANSCRIPTION ENCOUNTER (OUTPATIENT)
Age: 85
End: 2020-09-06

## 2020-09-06 VITALS
RESPIRATION RATE: 17 BRPM | OXYGEN SATURATION: 98 % | SYSTOLIC BLOOD PRESSURE: 130 MMHG | TEMPERATURE: 98 F | HEART RATE: 72 BPM | DIASTOLIC BLOOD PRESSURE: 64 MMHG

## 2020-09-06 LAB
ANION GAP SERPL CALC-SCNC: 14 MMO/L — SIGNIFICANT CHANGE UP (ref 7–14)
BUN SERPL-MCNC: 13 MG/DL — SIGNIFICANT CHANGE UP (ref 7–23)
CALCIUM SERPL-MCNC: 8.3 MG/DL — LOW (ref 8.4–10.5)
CHLORIDE SERPL-SCNC: 102 MMOL/L — SIGNIFICANT CHANGE UP (ref 98–107)
CO2 SERPL-SCNC: 21 MMOL/L — LOW (ref 22–31)
CREAT SERPL-MCNC: 0.73 MG/DL — SIGNIFICANT CHANGE UP (ref 0.5–1.3)
GLUCOSE SERPL-MCNC: 103 MG/DL — HIGH (ref 70–99)
HCT VFR BLD CALC: 34.4 % — LOW (ref 39–50)
HGB BLD-MCNC: 11.4 G/DL — LOW (ref 13–17)
MAGNESIUM SERPL-MCNC: 2.1 MG/DL — SIGNIFICANT CHANGE UP (ref 1.6–2.6)
MCHC RBC-ENTMCNC: 30.8 PG — SIGNIFICANT CHANGE UP (ref 27–34)
MCHC RBC-ENTMCNC: 33.1 % — SIGNIFICANT CHANGE UP (ref 32–36)
MCV RBC AUTO: 93 FL — SIGNIFICANT CHANGE UP (ref 80–100)
NRBC # FLD: 0 K/UL — SIGNIFICANT CHANGE UP (ref 0–0)
PHOSPHATE SERPL-MCNC: 2.2 MG/DL — LOW (ref 2.5–4.5)
PLATELET # BLD AUTO: 214 K/UL — SIGNIFICANT CHANGE UP (ref 150–400)
PMV BLD: 9.1 FL — SIGNIFICANT CHANGE UP (ref 7–13)
POTASSIUM SERPL-MCNC: 4 MMOL/L — SIGNIFICANT CHANGE UP (ref 3.5–5.3)
POTASSIUM SERPL-SCNC: 4 MMOL/L — SIGNIFICANT CHANGE UP (ref 3.5–5.3)
RBC # BLD: 3.7 M/UL — LOW (ref 4.2–5.8)
RBC # FLD: 12.7 % — SIGNIFICANT CHANGE UP (ref 10.3–14.5)
SODIUM SERPL-SCNC: 137 MMOL/L — SIGNIFICANT CHANGE UP (ref 135–145)
WBC # BLD: 7.91 K/UL — SIGNIFICANT CHANGE UP (ref 3.8–10.5)
WBC # FLD AUTO: 7.91 K/UL — SIGNIFICANT CHANGE UP (ref 3.8–10.5)

## 2020-09-06 PROCEDURE — 71045 X-RAY EXAM CHEST 1 VIEW: CPT | Mod: 26

## 2020-09-06 RX ORDER — TRAMADOL HYDROCHLORIDE 50 MG/1
1 TABLET ORAL
Qty: 20 | Refills: 0
Start: 2020-09-06 | End: 2020-09-10

## 2020-09-06 RX ORDER — ACETAMINOPHEN 500 MG
2 TABLET ORAL
Qty: 0 | Refills: 0 | DISCHARGE
Start: 2020-09-06

## 2020-09-06 RX ADMIN — Medication 1 DROP(S): at 05:50

## 2020-09-06 RX ADMIN — HEPARIN SODIUM 5000 UNIT(S): 5000 INJECTION INTRAVENOUS; SUBCUTANEOUS at 05:49

## 2020-09-06 RX ADMIN — PANTOPRAZOLE SODIUM 40 MILLIGRAM(S): 20 TABLET, DELAYED RELEASE ORAL at 05:49

## 2020-09-06 NOTE — DISCHARGE NOTE NURSING/CASE MANAGEMENT/SOCIAL WORK - NSDCFUADDAPPT_GEN_ALL_CORE_FT
See Dr Streeter in two weeks.  Call for an appointment.[phone]  Have a chest xray done prior to your apt and then bring those images with you.

## 2020-09-06 NOTE — DISCHARGE NOTE NURSING/CASE MANAGEMENT/SOCIAL WORK - PATIENT PORTAL LINK FT
You can access the FollowMyHealth Patient Portal offered by [facility] by registering at the following website: [email or web address]. By joining [employer]’s FollowMyHealth portal, you will also be able to view your health information using other applications (apps) compatible with our system.

## 2020-09-14 PROBLEM — C34.92 MALIGNANT NEOPLASM OF UNSPECIFIED PART OF LEFT BRONCHUS OR LUNG: Chronic | Status: ACTIVE | Noted: 2020-08-18

## 2020-09-14 PROBLEM — A15.9 RESPIRATORY TUBERCULOSIS UNSPECIFIED: Chronic | Status: ACTIVE | Noted: 2020-08-18

## 2020-09-14 PROBLEM — Z87.438 PERSONAL HISTORY OF OTHER DISEASES OF MALE GENITAL ORGANS: Chronic | Status: ACTIVE | Noted: 2020-08-18

## 2020-09-14 PROBLEM — E78.5 HYPERLIPIDEMIA, UNSPECIFIED: Chronic | Status: ACTIVE | Noted: 2020-08-18

## 2020-09-14 PROBLEM — I10 ESSENTIAL (PRIMARY) HYPERTENSION: Chronic | Status: ACTIVE | Noted: 2020-08-18

## 2020-09-14 PROBLEM — M19.90 UNSPECIFIED OSTEOARTHRITIS, UNSPECIFIED SITE: Chronic | Status: ACTIVE | Noted: 2020-08-18

## 2020-09-14 PROBLEM — H40.9 UNSPECIFIED GLAUCOMA: Chronic | Status: ACTIVE | Noted: 2020-08-18

## 2020-09-14 PROBLEM — F03.90 UNSPECIFIED DEMENTIA WITHOUT BEHAVIORAL DISTURBANCE: Chronic | Status: ACTIVE | Noted: 2020-08-18

## 2020-09-15 ENCOUNTER — APPOINTMENT (OUTPATIENT)
Dept: THORACIC SURGERY | Facility: CLINIC | Age: 85
End: 2020-09-15
Payer: MEDICARE

## 2020-09-15 ENCOUNTER — OUTPATIENT (OUTPATIENT)
Dept: OUTPATIENT SERVICES | Facility: HOSPITAL | Age: 85
LOS: 1 days | End: 2020-09-15
Payer: MEDICARE

## 2020-09-15 ENCOUNTER — APPOINTMENT (OUTPATIENT)
Dept: RADIOLOGY | Facility: HOSPITAL | Age: 85
End: 2020-09-15

## 2020-09-15 ENCOUNTER — RESULT REVIEW (OUTPATIENT)
Age: 85
End: 2020-09-15

## 2020-09-15 VITALS
HEART RATE: 65 BPM | RESPIRATION RATE: 16 BRPM | DIASTOLIC BLOOD PRESSURE: 62 MMHG | SYSTOLIC BLOOD PRESSURE: 123 MMHG | OXYGEN SATURATION: 97 %

## 2020-09-15 DIAGNOSIS — Z98.890 OTHER SPECIFIED POSTPROCEDURAL STATES: Chronic | ICD-10-CM

## 2020-09-15 DIAGNOSIS — Z96.652 PRESENCE OF LEFT ARTIFICIAL KNEE JOINT: Chronic | ICD-10-CM

## 2020-09-15 DIAGNOSIS — Z95.0 PRESENCE OF CARDIAC PACEMAKER: Chronic | ICD-10-CM

## 2020-09-15 DIAGNOSIS — C34.90 MALIGNANT NEOPLASM OF UNSPECIFIED PART OF UNSPECIFIED BRONCHUS OR LUNG: ICD-10-CM

## 2020-09-15 PROCEDURE — 71046 X-RAY EXAM CHEST 2 VIEWS: CPT | Mod: 26

## 2020-09-15 PROCEDURE — 99024 POSTOP FOLLOW-UP VISIT: CPT

## 2020-09-18 RX ORDER — MOMETASONE FUROATE 1 MG/G
0.1 CREAM TOPICAL
Qty: 45 | Refills: 0 | Status: ACTIVE | COMMUNITY
Start: 2020-07-07

## 2020-09-18 RX ORDER — HYDROCORTISONE 1 %
12 CREAM (GRAM) TOPICAL
Qty: 400 | Refills: 0 | Status: ACTIVE | COMMUNITY
Start: 2020-06-08

## 2020-09-18 RX ORDER — ERYTHROMYCIN 5 MG/G
5 OINTMENT OPHTHALMIC
Qty: 4 | Refills: 0 | Status: ACTIVE | COMMUNITY
Start: 2020-08-24

## 2020-09-18 RX ORDER — BETAMETHASONE DIPROPIONATE 0.5 MG/G
0.05 CREAM TOPICAL
Qty: 45 | Refills: 0 | Status: ACTIVE | COMMUNITY
Start: 2020-08-22

## 2020-09-18 RX ORDER — CHLORHEXIDINE GLUCONATE, 0.12% ORAL RINSE 1.2 MG/ML
0.12 SOLUTION DENTAL
Qty: 473 | Refills: 0 | Status: ACTIVE | COMMUNITY
Start: 2020-07-07

## 2020-09-18 RX ORDER — CYCLOSPORINE 0.5 MG/ML
0.05 EMULSION OPHTHALMIC
Qty: 60 | Refills: 0 | Status: ACTIVE | COMMUNITY
Start: 2020-08-24

## 2020-10-08 ENCOUNTER — OUTPATIENT (OUTPATIENT)
Dept: OUTPATIENT SERVICES | Facility: HOSPITAL | Age: 85
LOS: 1 days | End: 2020-10-08
Payer: MEDICARE

## 2020-10-08 ENCOUNTER — RESULT REVIEW (OUTPATIENT)
Age: 85
End: 2020-10-08

## 2020-10-08 ENCOUNTER — APPOINTMENT (OUTPATIENT)
Dept: RADIOLOGY | Facility: HOSPITAL | Age: 85
End: 2020-10-08

## 2020-10-08 ENCOUNTER — APPOINTMENT (OUTPATIENT)
Dept: THORACIC SURGERY | Facility: CLINIC | Age: 85
End: 2020-10-08
Payer: MEDICARE

## 2020-10-08 DIAGNOSIS — Z96.652 PRESENCE OF LEFT ARTIFICIAL KNEE JOINT: Chronic | ICD-10-CM

## 2020-10-08 DIAGNOSIS — C34.92 MALIGNANT NEOPLASM OF UNSPECIFIED PART OF LEFT BRONCHUS OR LUNG: ICD-10-CM

## 2020-10-08 DIAGNOSIS — Z98.890 OTHER SPECIFIED POSTPROCEDURAL STATES: Chronic | ICD-10-CM

## 2020-10-08 DIAGNOSIS — Z95.0 PRESENCE OF CARDIAC PACEMAKER: Chronic | ICD-10-CM

## 2020-10-08 PROCEDURE — 71046 X-RAY EXAM CHEST 2 VIEWS: CPT | Mod: 26

## 2020-10-08 PROCEDURE — 99024 POSTOP FOLLOW-UP VISIT: CPT

## 2020-10-09 VITALS
HEART RATE: 66 BPM | TEMPERATURE: 97.7 F | DIASTOLIC BLOOD PRESSURE: 86 MMHG | OXYGEN SATURATION: 95 % | RESPIRATION RATE: 17 BRPM | SYSTOLIC BLOOD PRESSURE: 144 MMHG

## 2021-02-11 ENCOUNTER — APPOINTMENT (OUTPATIENT)
Dept: THORACIC SURGERY | Facility: CLINIC | Age: 86
End: 2021-02-11
Payer: MEDICARE

## 2021-02-25 ENCOUNTER — APPOINTMENT (OUTPATIENT)
Dept: THORACIC SURGERY | Facility: CLINIC | Age: 86
End: 2021-02-25
Payer: MEDICARE

## 2021-02-25 VITALS
WEIGHT: 138 LBS | HEIGHT: 68 IN | HEART RATE: 88 BPM | TEMPERATURE: 98 F | DIASTOLIC BLOOD PRESSURE: 57 MMHG | SYSTOLIC BLOOD PRESSURE: 102 MMHG | OXYGEN SATURATION: 93 % | BODY MASS INDEX: 20.92 KG/M2

## 2021-02-25 PROCEDURE — 99214 OFFICE O/P EST MOD 30 MIN: CPT

## 2021-02-26 RX ORDER — DICLOFENAC SODIUM 1% 10 MG/G
1 GEL TOPICAL
Qty: 100 | Refills: 0 | Status: ACTIVE | COMMUNITY
Start: 2021-02-17

## 2021-02-26 RX ORDER — FUROSEMIDE 40 MG/1
40 TABLET ORAL
Qty: 30 | Refills: 0 | Status: ACTIVE | COMMUNITY
Start: 2020-12-08

## 2021-02-26 RX ORDER — OFLOXACIN OTIC 3 MG/ML
0.3 SOLUTION AURICULAR (OTIC)
Qty: 5 | Refills: 0 | Status: ACTIVE | COMMUNITY
Start: 2021-02-17

## 2021-02-26 RX ORDER — FUROSEMIDE 20 MG/1
20 TABLET ORAL
Refills: 0 | Status: ACTIVE | COMMUNITY

## 2021-02-26 RX ORDER — APIXABAN 2.5 MG/1
2.5 TABLET, FILM COATED ORAL
Refills: 0 | Status: ACTIVE | COMMUNITY

## 2021-03-01 ENCOUNTER — INPATIENT (INPATIENT)
Facility: HOSPITAL | Age: 86
LOS: 6 days | Discharge: HOME HEALTH | End: 2021-03-08
Attending: THORACIC SURGERY (CARDIOTHORACIC VASCULAR SURGERY) | Admitting: THORACIC SURGERY (CARDIOTHORACIC VASCULAR SURGERY)
Payer: MEDICARE

## 2021-03-01 VITALS
OXYGEN SATURATION: 100 % | SYSTOLIC BLOOD PRESSURE: 133 MMHG | DIASTOLIC BLOOD PRESSURE: 76 MMHG | WEIGHT: 138.89 LBS | RESPIRATION RATE: 18 BRPM | HEART RATE: 75 BPM | TEMPERATURE: 99 F

## 2021-03-01 DIAGNOSIS — Z90.2 ACQUIRED ABSENCE OF LUNG [PART OF]: Chronic | ICD-10-CM

## 2021-03-01 DIAGNOSIS — J90 PLEURAL EFFUSION, NOT ELSEWHERE CLASSIFIED: ICD-10-CM

## 2021-03-01 DIAGNOSIS — Z98.890 OTHER SPECIFIED POSTPROCEDURAL STATES: Chronic | ICD-10-CM

## 2021-03-01 DIAGNOSIS — Z96.652 PRESENCE OF LEFT ARTIFICIAL KNEE JOINT: Chronic | ICD-10-CM

## 2021-03-01 DIAGNOSIS — Z95.0 PRESENCE OF CARDIAC PACEMAKER: Chronic | ICD-10-CM

## 2021-03-01 LAB
ANION GAP SERPL CALC-SCNC: 11 MMOL/L — SIGNIFICANT CHANGE UP (ref 7–14)
ANION GAP SERPL CALC-SCNC: 9 MMOL/L — SIGNIFICANT CHANGE UP (ref 7–14)
APTT BLD: 27.9 SEC — SIGNIFICANT CHANGE UP (ref 27–36.3)
BASOPHILS # BLD AUTO: 0.02 K/UL — SIGNIFICANT CHANGE UP (ref 0–0.2)
BASOPHILS NFR BLD AUTO: 0.2 % — SIGNIFICANT CHANGE UP (ref 0–2)
BLD GP AB SCN SERPL QL: NEGATIVE — SIGNIFICANT CHANGE UP
BUN SERPL-MCNC: 12 MG/DL — SIGNIFICANT CHANGE UP (ref 7–23)
BUN SERPL-MCNC: 12 MG/DL — SIGNIFICANT CHANGE UP (ref 7–23)
CALCIUM SERPL-MCNC: 8.7 MG/DL — SIGNIFICANT CHANGE UP (ref 8.4–10.5)
CALCIUM SERPL-MCNC: 9 MG/DL — SIGNIFICANT CHANGE UP (ref 8.4–10.5)
CHLORIDE SERPL-SCNC: 103 MMOL/L — SIGNIFICANT CHANGE UP (ref 98–107)
CHLORIDE SERPL-SCNC: 103 MMOL/L — SIGNIFICANT CHANGE UP (ref 98–107)
CO2 SERPL-SCNC: 24 MMOL/L — SIGNIFICANT CHANGE UP (ref 22–31)
CO2 SERPL-SCNC: 25 MMOL/L — SIGNIFICANT CHANGE UP (ref 22–31)
CREAT SERPL-MCNC: 0.62 MG/DL — SIGNIFICANT CHANGE UP (ref 0.5–1.3)
CREAT SERPL-MCNC: 0.65 MG/DL — SIGNIFICANT CHANGE UP (ref 0.5–1.3)
EOSINOPHIL # BLD AUTO: 0.01 K/UL — SIGNIFICANT CHANGE UP (ref 0–0.5)
EOSINOPHIL NFR BLD AUTO: 0.1 % — SIGNIFICANT CHANGE UP (ref 0–6)
GLUCOSE SERPL-MCNC: 100 MG/DL — HIGH (ref 70–99)
GLUCOSE SERPL-MCNC: 120 MG/DL — HIGH (ref 70–99)
HCT VFR BLD CALC: 39 % — SIGNIFICANT CHANGE UP (ref 39–50)
HGB BLD-MCNC: 13.1 G/DL — SIGNIFICANT CHANGE UP (ref 13–17)
IANC: 7.59 K/UL — SIGNIFICANT CHANGE UP (ref 1.5–8.5)
IMM GRANULOCYTES NFR BLD AUTO: 0.4 % — SIGNIFICANT CHANGE UP (ref 0–1.5)
INR BLD: 1.05 RATIO — SIGNIFICANT CHANGE UP (ref 0.88–1.16)
LYMPHOCYTES # BLD AUTO: 0.71 K/UL — LOW (ref 1–3.3)
LYMPHOCYTES # BLD AUTO: 7.8 % — LOW (ref 13–44)
MAGNESIUM SERPL-MCNC: 2.1 MG/DL — SIGNIFICANT CHANGE UP (ref 1.6–2.6)
MCHC RBC-ENTMCNC: 30.3 PG — SIGNIFICANT CHANGE UP (ref 27–34)
MCHC RBC-ENTMCNC: 33.6 GM/DL — SIGNIFICANT CHANGE UP (ref 32–36)
MCV RBC AUTO: 90.3 FL — SIGNIFICANT CHANGE UP (ref 80–100)
MONOCYTES # BLD AUTO: 0.78 K/UL — SIGNIFICANT CHANGE UP (ref 0–0.9)
MONOCYTES NFR BLD AUTO: 8.5 % — SIGNIFICANT CHANGE UP (ref 2–14)
NEUTROPHILS # BLD AUTO: 7.59 K/UL — HIGH (ref 1.8–7.4)
NEUTROPHILS NFR BLD AUTO: 83 % — HIGH (ref 43–77)
NRBC # BLD: 0 /100 WBCS — SIGNIFICANT CHANGE UP
NRBC # FLD: 0 K/UL — SIGNIFICANT CHANGE UP
PLATELET # BLD AUTO: 169 K/UL — SIGNIFICANT CHANGE UP (ref 150–400)
POTASSIUM SERPL-MCNC: 3 MMOL/L — LOW (ref 3.5–5.3)
POTASSIUM SERPL-MCNC: 4.1 MMOL/L — SIGNIFICANT CHANGE UP (ref 3.5–5.3)
POTASSIUM SERPL-SCNC: 3 MMOL/L — LOW (ref 3.5–5.3)
POTASSIUM SERPL-SCNC: 4.1 MMOL/L — SIGNIFICANT CHANGE UP (ref 3.5–5.3)
PROTHROM AB SERPL-ACNC: 12.1 SEC — SIGNIFICANT CHANGE UP (ref 10.6–13.6)
RBC # BLD: 4.32 M/UL — SIGNIFICANT CHANGE UP (ref 4.2–5.8)
RBC # FLD: 13.8 % — SIGNIFICANT CHANGE UP (ref 10.3–14.5)
RH IG SCN BLD-IMP: POSITIVE — SIGNIFICANT CHANGE UP
SARS-COV-2 RNA SPEC QL NAA+PROBE: SIGNIFICANT CHANGE UP
SODIUM SERPL-SCNC: 137 MMOL/L — SIGNIFICANT CHANGE UP (ref 135–145)
SODIUM SERPL-SCNC: 138 MMOL/L — SIGNIFICANT CHANGE UP (ref 135–145)
WBC # BLD: 9.15 K/UL — SIGNIFICANT CHANGE UP (ref 3.8–10.5)
WBC # FLD AUTO: 9.15 K/UL — SIGNIFICANT CHANGE UP (ref 3.8–10.5)

## 2021-03-01 PROCEDURE — 32651 THORACOSCOPY REMOVE CORTEX: CPT | Mod: AS

## 2021-03-01 PROCEDURE — 99222 1ST HOSP IP/OBS MODERATE 55: CPT

## 2021-03-01 PROCEDURE — 71045 X-RAY EXAM CHEST 1 VIEW: CPT | Mod: 26

## 2021-03-01 PROCEDURE — 93010 ELECTROCARDIOGRAM REPORT: CPT

## 2021-03-01 RX ORDER — ATENOLOL 25 MG/1
1 TABLET ORAL
Qty: 0 | Refills: 0 | DISCHARGE

## 2021-03-01 RX ORDER — SODIUM CHLORIDE 9 MG/ML
1000 INJECTION, SOLUTION INTRAVENOUS
Refills: 0 | Status: DISCONTINUED | OUTPATIENT
Start: 2021-03-01 | End: 2021-03-03

## 2021-03-01 RX ORDER — TAMSULOSIN HYDROCHLORIDE 0.4 MG/1
0.4 CAPSULE ORAL AT BEDTIME
Refills: 0 | Status: DISCONTINUED | OUTPATIENT
Start: 2021-03-01 | End: 2021-03-08

## 2021-03-01 RX ORDER — TIMOLOL 0.5 %
1 DROPS OPHTHALMIC (EYE)
Qty: 0 | Refills: 0 | DISCHARGE

## 2021-03-01 RX ORDER — GABAPENTIN 400 MG/1
100 CAPSULE ORAL ONCE
Refills: 0 | Status: COMPLETED | OUTPATIENT
Start: 2021-03-02 | End: 2021-03-02

## 2021-03-01 RX ORDER — ASPIRIN/CALCIUM CARB/MAGNESIUM 324 MG
1 TABLET ORAL
Qty: 0 | Refills: 0 | DISCHARGE

## 2021-03-01 RX ORDER — CYCLOSPORINE 0.5 MG/ML
1 EMULSION OPHTHALMIC
Qty: 0 | Refills: 0 | DISCHARGE

## 2021-03-01 RX ORDER — MEMANTINE HYDROCHLORIDE 10 MG/1
1 TABLET ORAL
Qty: 0 | Refills: 0 | DISCHARGE

## 2021-03-01 RX ORDER — OMEPRAZOLE 10 MG/1
1 CAPSULE, DELAYED RELEASE ORAL
Qty: 0 | Refills: 0 | DISCHARGE

## 2021-03-01 RX ORDER — HEPARIN SODIUM 5000 [USP'U]/ML
5000 INJECTION INTRAVENOUS; SUBCUTANEOUS EVERY 8 HOURS
Refills: 0 | Status: DISCONTINUED | OUTPATIENT
Start: 2021-03-01 | End: 2021-03-08

## 2021-03-01 RX ORDER — POTASSIUM CHLORIDE 20 MEQ
20 PACKET (EA) ORAL ONCE
Refills: 0 | Status: COMPLETED | OUTPATIENT
Start: 2021-03-01 | End: 2021-03-01

## 2021-03-01 RX ORDER — FUROSEMIDE 40 MG
1 TABLET ORAL
Qty: 0 | Refills: 0 | DISCHARGE

## 2021-03-01 RX ORDER — POTASSIUM CHLORIDE 20 MEQ
40 PACKET (EA) ORAL ONCE
Refills: 0 | Status: COMPLETED | OUTPATIENT
Start: 2021-03-01 | End: 2021-03-01

## 2021-03-01 RX ORDER — TAMSULOSIN HYDROCHLORIDE 0.4 MG/1
1 CAPSULE ORAL
Qty: 0 | Refills: 0 | DISCHARGE

## 2021-03-01 RX ORDER — OFLOXACIN 0.3 %
1 DROPS OPHTHALMIC (EYE)
Qty: 0 | Refills: 0 | DISCHARGE

## 2021-03-01 RX ORDER — ALENDRONATE SODIUM 70 MG/1
1 TABLET ORAL
Qty: 0 | Refills: 0 | DISCHARGE

## 2021-03-01 RX ORDER — ATENOLOL 25 MG/1
50 TABLET ORAL EVERY 12 HOURS
Refills: 0 | Status: DISCONTINUED | OUTPATIENT
Start: 2021-03-01 | End: 2021-03-08

## 2021-03-01 RX ORDER — OSIMERTINIB 80 1/1
0 TABLET, FILM COATED ORAL
Qty: 0 | Refills: 0 | DISCHARGE

## 2021-03-01 RX ORDER — APIXABAN 2.5 MG/1
1 TABLET, FILM COATED ORAL
Qty: 0 | Refills: 0 | DISCHARGE

## 2021-03-01 RX ADMIN — Medication 40 MILLIEQUIVALENT(S): at 14:08

## 2021-03-01 RX ADMIN — HEPARIN SODIUM 5000 UNIT(S): 5000 INJECTION INTRAVENOUS; SUBCUTANEOUS at 12:00

## 2021-03-01 RX ADMIN — HEPARIN SODIUM 5000 UNIT(S): 5000 INJECTION INTRAVENOUS; SUBCUTANEOUS at 21:14

## 2021-03-01 RX ADMIN — TAMSULOSIN HYDROCHLORIDE 0.4 MILLIGRAM(S): 0.4 CAPSULE ORAL at 21:15

## 2021-03-01 RX ADMIN — ATENOLOL 50 MILLIGRAM(S): 25 TABLET ORAL at 21:15

## 2021-03-01 RX ADMIN — ATENOLOL 50 MILLIGRAM(S): 25 TABLET ORAL at 11:59

## 2021-03-01 RX ADMIN — Medication 20 MILLIEQUIVALENT(S): at 14:45

## 2021-03-01 NOTE — H&P ADULT - PROBLEM SELECTOR PLAN 1
- Patient admitted to airborne isolation room on 6N Telemetry unit, and COVID PCR brought to lab by myself; Results pending  - After speaking with patients son, patient has not taken ANY of his medications since Thrusday, 2/25/21.  Restart atenolol, flomax at this time.  - Follow-up labs (CBC, BMP, T&S, coags - last dose Eliquis was Thursday 2/25/21), EKG, CXR  - Plan is for Right VATS, Robotic-Assisted Pleural Biopsy, Drainage of Pleural Effusion, Possible Pleurodesis and PleurX Catheter Placement tomorrow w/ Dr. Streeter  - NPO after midnight  - Consent to follow - Patient admitted to airborne isolation room on 6N Telemetry unit, and COVID PCR brought to lab by myself; Results pending  - After speaking with patients son, patient has not taken ANY of his medications since Thrusday, 2/25/21.  Restart atenolol ( by cuff now, and patient is v-pacing at 80bpm), flomax at this time.  - Follow-up labs (CBC, BMP, T&S, coags - last dose Eliquis was Thursday 2/25/21), EKG, CXR  - Plan is for Right VATS, Robotic-Assisted Pleural Biopsy, Drainage of Pleural Effusion, Possible Pleurodesis and PleurX Catheter Placement tomorrow w/ Dr. Streeter  - NPO after midnight  - Consent to follow

## 2021-03-01 NOTE — CONSULT LETTER
[FreeTextEntry2] : Dr. Duvall (PCP/Ref) \par Lizbeth Carolina MD (Hem/Onc)  [FreeTextEntry3] : Sebas Streeter MD, MPH \par System Director of Thoracic Surgery \par Director of Comprehensive Lung and Foregut [facility] \par Professor Cardiovascular & Thoracic Surgery  \par [facility] [employer] [facility]\par \par [facility]\par [address]\par Oncology [address]\[place] [address]\par Tel: [phone]\par Fax: [phone]\par

## 2021-03-01 NOTE — DATA REVIEWED
[FreeTextEntry1] : CT Chest on 2/20/21:\par - large Rt pleural effusion w/ fluid extending into the Rt major fissure w/ Rt basilar compression atelectasis\par - small Lt pleural effusion

## 2021-03-01 NOTE — PATIENT PROFILE ADULT - FALL HARM RISK
Patient comes to clinic for follow up anticoagulation visit.   Last INR 5/8 was 2.7.  Dose maintained per protocol.   Today's INR is 2.9 and is within goal range.    Current warfarin dosing verified with patient. Patient was informed that their INR result is within therapeutic range and instructed to maintain current dose per protocol. Discussed dose and return date for next INR in 4 weeks. Denies any changes in diet or medications.  Denies any unusual bleeding. States fell at PCP appt this week. Bruise to right calf and mervin arms. Denies any missed doses. See AAC flowsheet. Pt ambulated to office without assistive device, but limping from bruising to right calf. [facility]      Dr. Camilo is in the office today supervising the treatment.    Patient was instructed to contact the clinic with any unusual bleeding or bruising, any changes in medications, diet, health status, lifestyle, or any other changes, questions or concerns. Patient verbalized understanding of all discussed.      age(85 years old or older)

## 2021-03-01 NOTE — HISTORY OF PRESENT ILLNESS
[FreeTextEntry1] : Mr. DOMENIC HAN, 86 year old male, never smoker, w/ hx of HTN, HLD, Dementia, +T.B. (not treated), abnormal heart rhythm s/p pacemaker ~2010, who presented to PCP c/o productive cough with large amount of thick yellowish sputum x 1 mo in Nov 2019, sent for CXR, then CT scan, he was prescribed Augmentin 875mg BID x 10 days, completed in 01/2020. \par \par PFTs on 03/04/2020: %, FEV1 102%, DLCO 67%.\par \par PET/CT on 03/05/2020:\par - mild activity noted within the medial aspect of the right iliac bone with SUV 2.2 (image 135)\par - asymmetric medial deviation of the right vocal cord, with SUV 2.5\par - 2.8 x 3.5 cm mass in the posterior segment of the MYLES (image 50) with SUV 16.3 in the anterior aspect of the lesion\par - 6 mm LLL nodule is difficult to discern on this study secondary to atelectasis\par - some hypermetabolic activity with SUV of 2.8 is noted in the left hilum. The findings may be inflammatory in nature\par - the ascending aorta is mildly dilated, up to 4.2 cm \par - there is marked prostatic enlargement, with the gland measuring 7.5 cm \par \par Of note, Last seen patient on 02/27/2020, I recommended a CT guided bx of Left apex, PET/CT, and PFTs. Patient was seen by Dr. Jitendra Pang on 03/10/2020 and plan for a bx. Patient called back in April and preferred to postponed his appointment due to COVID-19 crisis. \par \par Patient is now s/p CT guided bx of MYLES nodule on 07/23/2020 by Dr. Jitendra Pang. Path of MYLES nodule core bx revealed positive for malignant cells. Non-small cell carcinoma, favor adenocarcinoma. + TTF1, consistent with a primary pulmonary adenocarcinoma. Foundation One is pending. \par \par PET/CT on 08/01/2020:\par - 45 x 38 mm mass in the posterior left lung apex (image 45) extending to the pleura, attributed to a combination of malignancy and scar with SUV 16.9, w/o significant change\par - a new 5 mm nodule adjacent to the right minor fissure anterolaterally (image 67)\par - a few areas of subsegmental atelectasis and/or scarring in both lungs\par - there are stable areas of pericardial calcification attributed to prior sites of chronic inflammation\par - there a few stable enlarged mediastinal nodes w/o malignant activity on PET, for example 15 mm in the left retrosternal region (image 50)\par - SUV 2.2 activity in the right iliac bone near the SI joint (image 132), w/o significant change from 03/05/2020 and w/o CT abnormality\par - there are minimal gallstones and mild fatty infiltration of the liver, new, as well as stable severe prostate gland enlargement at 74 mm\par - 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 Echevarria.\par S/P Flex Bronch on 9/1/2020 by Dr. Lr. \par \par Referred to Hem/Onc Dr. Lizbeth Carolina. Patient started on Targrisso 80 mg on 9/30/2020. \par \par CT Chest on 2/20/21:\par - large Rt pleural effusion w/ fluid extending into the Rt major fissure w/ Rt basilar compression atelectasis\par - small Lt pleural effusion\par \par Patient is here today for a follow up. Admits to SOB on exertion and chest pressure.

## 2021-03-01 NOTE — ASSESSMENT
[FreeTextEntry1] : Mr. DOMENIC HAN, 86 year old male, never smoker, w/ hx of HTN, HLD, Dementia, +T.B. (not treated), abnormal heart rhythm s/p pacemaker ~2010, and Lung CA.\par \par Now 6 mo s/p Lt VATS Robotic-assisted, LULobectomy w/ en bloc parietal pleurectomy, MLND, wedge rxn of LLL tear on 8/25/2020. 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 Echevarria.\par S/P Flex Bronch on 9/1/2020 by Dr. Lr. \par \par Referred to Hem/Onc Dr. Lizbeth Carolina. Patient started on Targrisso 80 mg on 9/30/2020. \par \par CT Chest on 2/20/21:\par - large Rt pleural effusion w/ fluid extending into the Rt major fissure w/ Rt basilar compression atelectasis\par - small Lt pleural effusion\par \par I have reviewed the patient's medical records and diagnostic images at time of this office consultation and have made the following recommendation:\par 1. CT scan reviewed, large Rt pleural effusion noted, patient is symptomatic, but he is on Eliquis, I recommended patient to consult with cardiologist to obtain an ECHO, hold Eliquis starting Friday 2/26. Will have patient present to ED on 3/1 for direct admit for Flex Bronch, Rt VATS Robotic-assisted, pleural biopsy, drainage of Rt pleural effusion, possible pleurodesis, Rt PleurX catheter placement on 3/2/21. Risks and benefits and alternatives explained to patient, all questions answered, patient agreed to proceed with surgery.\par 2. PET/CT scan ASAP.\par \par \par I personally performed the services described in the documentation, reviewed the documentation recorded by the scribe in my presence and it accurately and completely records my words and actions.\par \par I, Jeremías Gtz NP, am scribing for and the presence of JOHANNA Proctor, the following sections HISTORY OF PRESENT ILLNESS, PAST MEDICAL/FAMILY/SOCIAL HISTORY; REVIEW OF SYSTEMS; VITAL SIGNS; PHYSICAL EXAM; DISPOSITION.\par \par

## 2021-03-01 NOTE — H&P ADULT - NSICDXPASTSURGICALHX_GEN_ALL_CORE_FT
well appearing
PAST SURGICAL HISTORY:  History of lung biopsy     Pacemaker [employer]  model [id number]  serial [id number]  date 7/1/2014    S/P lobectomy of lung     S/P total knee replacement, left

## 2021-03-01 NOTE — H&P ADULT - HISTORY OF PRESENT ILLNESS
87 y/o mandarin-speaking male w/ PMHx untreated TB, HTN, Arrhythmia (On Eliquis - last dose Thursday, 2/25/21), PPM ([employer]), who is s/p Left VATS, Robotic-Assisted Left Upper Lobectomy, Partial Pleurectomy, Left Lower Lobe Wedge Resection 8/25/20 (MYLES +adenocarcinoma and level 10 lymph node +metastatic carcinoma), admitted w/ large right pleural effusion.  Patient had CT chest 2/20/21 revealing large right pleural effusion with compressive atelectasis, and [place] [facility] [place] seen in office on 2/25/21.  Eliquis was held, and patient was referred for TTE (Dr. Montgomery) and PET scan this past weekend in preparation for Right VATS, Robotic-Assisted Pleural Biopsy, Drainage of Pleural Effusion, Possible Pleurodesis and PleurX Catheter Placement tomorrow.  With the help of , patient states that he is comfortable at rest, and denies any chest pains.

## 2021-03-01 NOTE — PHYSICAL EXAM
[Heart Rate And Rhythm] : heart rate was normal and rhythm regular [Heart Sounds] : normal S1 and S2 [Heart Sounds Gallop] : no gallops [Murmurs] : no murmurs [Heart Sounds Pericardial Friction Rub] : no pericardial rub [Examination Of The Chest] : the chest was normal in appearance [Chest Visual Inspection Thoracic Asymmetry] : no chest asymmetry [Diminished Respiratory Excursion] : normal chest expansion [Bowel Sounds] : normal bowel sounds [Abdomen Soft] : soft [Abdomen Tenderness] : non-tender [Abdomen Mass (___ Cm)] : no abdominal mass palpated [Skin Color & Pigmentation] : normal skin color and pigmentation [Skin Turgor] : normal skin turgor [] : no rash [Deep Tendon Reflexes (DTR)] : deep tendon reflexes were 2+ and symmetric [Sensation] : the sensory exam was normal to light touch and pinprick [No Focal Deficits] : no focal deficits [Oriented To Time, Place, And Person] : oriented to person, place, and time [Impaired Insight] : insight and judgment were intact [Affect] : the affect was normal [FreeTextEntry1] : in wheelchair

## 2021-03-02 ENCOUNTER — RESULT REVIEW (OUTPATIENT)
Age: 86
End: 2021-03-02

## 2021-03-02 ENCOUNTER — APPOINTMENT (OUTPATIENT)
Dept: THORACIC SURGERY | Facility: HOSPITAL | Age: 86
End: 2021-03-02

## 2021-03-02 LAB
ANION GAP SERPL CALC-SCNC: 8 MMOL/L — SIGNIFICANT CHANGE UP (ref 7–14)
BUN SERPL-MCNC: 13 MG/DL — SIGNIFICANT CHANGE UP (ref 7–23)
CALCIUM SERPL-MCNC: 8.6 MG/DL — SIGNIFICANT CHANGE UP (ref 8.4–10.5)
CHLORIDE SERPL-SCNC: 102 MMOL/L — SIGNIFICANT CHANGE UP (ref 98–107)
CO2 SERPL-SCNC: 23 MMOL/L — SIGNIFICANT CHANGE UP (ref 22–31)
CREAT SERPL-MCNC: 0.63 MG/DL — SIGNIFICANT CHANGE UP (ref 0.5–1.3)
GLUCOSE SERPL-MCNC: 119 MG/DL — HIGH (ref 70–99)
GRAM STN FLD: SIGNIFICANT CHANGE UP
HCT VFR BLD CALC: 38.3 % — LOW (ref 39–50)
HGB BLD-MCNC: 12.5 G/DL — LOW (ref 13–17)
MCHC RBC-ENTMCNC: 30.3 PG — SIGNIFICANT CHANGE UP (ref 27–34)
MCHC RBC-ENTMCNC: 32.6 GM/DL — SIGNIFICANT CHANGE UP (ref 32–36)
MCV RBC AUTO: 93 FL — SIGNIFICANT CHANGE UP (ref 80–100)
NIGHT BLUE STAIN TISS: SIGNIFICANT CHANGE UP
NRBC # BLD: 0 /100 WBCS — SIGNIFICANT CHANGE UP
NRBC # FLD: 0 K/UL — SIGNIFICANT CHANGE UP
PLATELET # BLD AUTO: 143 K/UL — LOW (ref 150–400)
POTASSIUM SERPL-MCNC: 3.9 MMOL/L — SIGNIFICANT CHANGE UP (ref 3.5–5.3)
POTASSIUM SERPL-SCNC: 3.9 MMOL/L — SIGNIFICANT CHANGE UP (ref 3.5–5.3)
RBC # BLD: 4.12 M/UL — LOW (ref 4.2–5.8)
RBC # FLD: 14 % — SIGNIFICANT CHANGE UP (ref 10.3–14.5)
SODIUM SERPL-SCNC: 133 MMOL/L — LOW (ref 135–145)
SPECIMEN SOURCE: SIGNIFICANT CHANGE UP
SPECIMEN SOURCE: SIGNIFICANT CHANGE UP
WBC # BLD: 12.26 K/UL — HIGH (ref 3.8–10.5)
WBC # FLD AUTO: 12.26 K/UL — HIGH (ref 3.8–10.5)

## 2021-03-02 PROCEDURE — S2900 ROBOTIC SURGICAL SYSTEM: CPT | Mod: NC

## 2021-03-02 PROCEDURE — 32651 THORACOSCOPY REMOVE CORTEX: CPT

## 2021-03-02 PROCEDURE — 71045 X-RAY EXAM CHEST 1 VIEW: CPT | Mod: 26

## 2021-03-02 PROCEDURE — 88305 TISSUE EXAM BY PATHOLOGIST: CPT | Mod: 26

## 2021-03-02 PROCEDURE — 99233 SBSQ HOSP IP/OBS HIGH 50: CPT

## 2021-03-02 PROCEDURE — 32550 INSERT PLEURAL CATH: CPT

## 2021-03-02 PROCEDURE — 88331 PATH CONSLTJ SURG 1 BLK 1SPC: CPT | Mod: 26

## 2021-03-02 PROCEDURE — 88112 CYTOPATH CELL ENHANCE TECH: CPT | Mod: 26

## 2021-03-02 PROCEDURE — 88305 TISSUE EXAM BY PATHOLOGIST: CPT | Mod: 26,59

## 2021-03-02 RX ORDER — HYDROMORPHONE HYDROCHLORIDE 2 MG/ML
30 INJECTION INTRAMUSCULAR; INTRAVENOUS; SUBCUTANEOUS
Refills: 0 | Status: DISCONTINUED | OUTPATIENT
Start: 2021-03-02 | End: 2021-03-04

## 2021-03-02 RX ORDER — ONDANSETRON 8 MG/1
4 TABLET, FILM COATED ORAL ONCE
Refills: 0 | Status: DISCONTINUED | OUTPATIENT
Start: 2021-03-02 | End: 2021-03-03

## 2021-03-02 RX ORDER — ONDANSETRON 8 MG/1
4 TABLET, FILM COATED ORAL EVERY 6 HOURS
Refills: 0 | Status: DISCONTINUED | OUTPATIENT
Start: 2021-03-02 | End: 2021-03-08

## 2021-03-02 RX ORDER — HYDROMORPHONE HYDROCHLORIDE 2 MG/ML
0.5 INJECTION INTRAMUSCULAR; INTRAVENOUS; SUBCUTANEOUS
Refills: 0 | Status: DISCONTINUED | OUTPATIENT
Start: 2021-03-02 | End: 2021-03-04

## 2021-03-02 RX ORDER — HYDROMORPHONE HYDROCHLORIDE 2 MG/ML
0.5 INJECTION INTRAMUSCULAR; INTRAVENOUS; SUBCUTANEOUS
Refills: 0 | Status: DISCONTINUED | OUTPATIENT
Start: 2021-03-02 | End: 2021-03-03

## 2021-03-02 RX ORDER — METOCLOPRAMIDE HCL 10 MG
10 TABLET ORAL ONCE
Refills: 0 | Status: DISCONTINUED | OUTPATIENT
Start: 2021-03-02 | End: 2021-03-03

## 2021-03-02 RX ORDER — NALOXONE HYDROCHLORIDE 4 MG/.1ML
0.1 SPRAY NASAL
Refills: 0 | Status: DISCONTINUED | OUTPATIENT
Start: 2021-03-02 | End: 2021-03-08

## 2021-03-02 RX ADMIN — HYDROMORPHONE HYDROCHLORIDE 30 MILLILITER(S): 2 INJECTION INTRAMUSCULAR; INTRAVENOUS; SUBCUTANEOUS at 15:01

## 2021-03-02 RX ADMIN — SODIUM CHLORIDE 75 MILLILITER(S): 9 INJECTION, SOLUTION INTRAVENOUS at 19:53

## 2021-03-02 RX ADMIN — SODIUM CHLORIDE 75 MILLILITER(S): 9 INJECTION, SOLUTION INTRAVENOUS at 06:43

## 2021-03-02 RX ADMIN — GABAPENTIN 100 MILLIGRAM(S): 400 CAPSULE ORAL at 09:55

## 2021-03-02 RX ADMIN — HEPARIN SODIUM 5000 UNIT(S): 5000 INJECTION INTRAVENOUS; SUBCUTANEOUS at 16:25

## 2021-03-02 RX ADMIN — HEPARIN SODIUM 5000 UNIT(S): 5000 INJECTION INTRAVENOUS; SUBCUTANEOUS at 20:13

## 2021-03-02 RX ADMIN — HYDROMORPHONE HYDROCHLORIDE 30 MILLILITER(S): 2 INJECTION INTRAMUSCULAR; INTRAVENOUS; SUBCUTANEOUS at 19:39

## 2021-03-02 RX ADMIN — HEPARIN SODIUM 5000 UNIT(S): 5000 INJECTION INTRAVENOUS; SUBCUTANEOUS at 04:56

## 2021-03-02 RX ADMIN — ATENOLOL 50 MILLIGRAM(S): 25 TABLET ORAL at 04:56

## 2021-03-02 RX ADMIN — TAMSULOSIN HYDROCHLORIDE 0.4 MILLIGRAM(S): 0.4 CAPSULE ORAL at 20:13

## 2021-03-02 NOTE — PROGRESS NOTE ADULT - SUBJECTIVE AND OBJECTIVE BOX
DOMENIC HAN                     [id number]    HPI:  85 y/o mandarin-speaking male w/ PMHx untreated TB, HTN, Arrhythmia (On Eliquis - last dose Thursday, 2/25/21), PPM ([employer]), who is s/p Left VATS, Robotic-Assisted Left Upper Lobectomy, Partial Pleurectomy, Left Lower Lobe Wedge Resection 8/25/20 (MYLES +adenocarcinoma and level 10 lymph node +metastatic carcinoma), admitted w/ large right pleural effusion.  Patient had CT chest 2/20/21 revealing large right pleural effusion with compressive atelectasis, and [place] [facility] [place] in office on [place]25/21.  Eliquis was held, and patient was referred for TTE (Dr. Montgomery) and PET scan this past weekend in preparation for Right VATS, Robotic-Assisted Pleural Biopsy, Drainage of Pleural Effusion, Possible Pleurodesis and PleurX Catheter Placement tomorrow.  With the help of , patient states that he is comfortable at rest, and denies any chest pains. (01 Mar 2021 10:55)      Procedure:  decortication, tunneled PleurX catheter. Robotic RVATS, large exudative right pleural effusion drained    Issues:  Pleural effusion   Dementia  BPH  HTN      PAST MEDICAL & SURGICAL HISTORY:  Glaucoma    Tuberculosis    Dementia    Arthritis    History of BPH    Malignant neoplasm of unspecified part of left bronchus or lung    Hyperlipidemia    Hypertension    S/P lobectomy of lung    History of lung biopsy    Pacemaker  [employer]  model [id number]  serial [id number]  date 7/1/2014    S/P total knee replacement, left              VITAL SIGNS:  Vital Signs Last 24 Hrs  T(C): 36.7 (02 Mar 2021 16:50), Max: 37.3 (02 Mar 2021 09:35)  T(F): 98 (02 Mar 2021 16:50), Max: 99.1 (02 Mar 2021 09:35)  HR: 62 (02 Mar 2021 17:00) (60 - 89)  BP: 113/56 (02 Mar 2021 17:00) (105/86 - 129/68)  BP(mean): 72 (02 Mar 2021 17:00) (67 - 102)  RR: 17 (02 Mar 2021 17:00) (14 - 21)  SpO2: 97% (02 Mar 2021 17:00) (95% - 100%)    I/Os:   I&O's Detail    01 Mar 2021 07:01  -  02 Mar 2021 07:00  --------------------------------------------------------  IN:    dextrose 5% + sodium chloride 0.45%: 600 mL  Total IN: 600 mL    OUT:    Voided (mL): 450 mL  Total OUT: 450 mL    Total NET: 150 mL      02 Mar 2021 07:01  -  02 Mar 2021 17:43  --------------------------------------------------------  IN:  Total IN: 0 mL    OUT:    Chest Tube (mL): 90 mL    Chest Tube (mL): 80 mL  Total OUT: 170 mL    Total NET: -170 mL          CAPILLARY BLOOD GLUCOSE          =======================MEDICATIONS===================  MEDICATIONS  (STANDING):  ATENolol  Tablet 50 milliGRAM(s) Oral every 12 hours  dextrose 5% + sodium chloride 0.45%. 1000 milliLiter(s) (75 mL/Hr) IV Continuous <Continuous>  heparin   Injectable 5000 Unit(s) SubCutaneous every 8 hours  HYDROmorphone PCA (1 mG/mL) 30 milliLiter(s) PCA Continuous PCA Continuous  tamsulosin 0.4 milliGRAM(s) Oral at bedtime    MEDICATIONS  (PRN):  HYDROmorphone  Injectable 0.5 milliGRAM(s) IV Push every 15 minutes PRN Moderate Pain (4 - 6)  HYDROmorphone PCA (1 mG/mL) Rescue Clinician Bolus 0.5 milliGRAM(s) IV Push every 15 minutes PRN for Pain Scale GREATER THAN 6  metoclopramide Injectable 10 milliGRAM(s) IV Push once PRN Nausea and/or Vomiting  naloxone Injectable 0.1 milliGRAM(s) IV Push every 3 minutes PRN For ANY of the following changes in patient status:  A. RR LESS THAN 10 breaths per minute, B. Oxygen saturation LESS THAN 90%, C. Sedation score of 6  ondansetron Injectable 4 milliGRAM(s) IV Push every 6 hours PRN Nausea  ondansetron Injectable 4 milliGRAM(s) IV Push once PRN Nausea and/or Vomiting        PHYSICAL EXAM============================  General:                         Awake, alert, not in any distress  Neuro:                            Moving all extremities to commands.   Respiratory:	Air entry fair and  bilateral conducted sounds                                           Effort even and unlabored.  CV:		Regular rate and rhythm. Normal S1/S2                                          Distal pulses present.  Abdomen:	                     Soft, non-distended. Bowel sounds present   Skin:		No rash.  Extremities:	Warm, no cyanosis or edema.  Palpable pulses    ============================LABS=========================                        12.5   12.26 )-----------( 143      ( 02 Mar 2021 06:51 )             38.3     03-02    133<L>  |  102  |  13  ----------------------------<  119<H>  3.9   |  23  |  0.63    Ca    8.6      02 Mar 2021 06:51  Mg     2.1     03-01        PT/INR - ( 01 Mar 2021 11:33 )   PT: 12.1 sec;   INR: 1.05 ratio         PTT - ( 01 Mar 2021 11:33 )  PTT:27.9 sec          =============================NEUROLOGY============================  Pain control with PCA / Tylenol IV  Dementia: Fall precautions   ==============================RESPIRATORY========================  Pt is on 2   L nasal canula   Comfortable, not in any distress.  Using incentive spirometry   Monitor chest tube output  Chest tube to suction  Continue bronchodilators, pulmonary toilet    ============================CARDIOVASCULAR======================  Continue hemodynamic monitoring.  Not on any pressors    =====================RENAL===================  Continue LR 30CC/hr    Monitor I/Os and electrolytes    ====================GASTROINTESTINAL===================  On clears, tolerating  Continue GI prophylaxis with Pepcid / Protonix  Continue Zofran / Reglan for nausea - PRN	    ========================HEMATOLOGIC/ONCOLOGIC====================  Monitor chest tube output. No signs of active bleeding.   Follow CBC in AM    ============================INFECTIOUS DISEASE========================  Monitor for fever / leukocytosis.  All surgical incision / chest tube  sites look clean      Pt is on GI & DVT prophylaxis  OOB & ambulate       Pertinent clinical, laboratory, radiographic, hemodynamic, echocardiographic, respiratory data, microbiologic data and chart were reviewed and analyzed frequently throughout the course of the day and night  Patient seen, examined and plan discussed with CT Surgery / CTICU team during rounds.    Pt's status discussed with family at bedside, updated status        Juan NARANJOP

## 2021-03-02 NOTE — BRIEF OPERATIVE NOTE - NSICDXBRIEFPROCEDURE_GEN_ALL_CORE_FT
PROCEDURES:  Insertion, PleurX catheter system, pleural 02-Mar-2021 14:08:41  Michael Pollack  Pleural biopsy 02-Mar-2021 14:08:30  Michael Pollack  Decortication, lung, partial 02-Mar-2021 14:07:37  Michael Pollack

## 2021-03-02 NOTE — BRIEF OPERATIVE NOTE - OPERATION/FINDINGS
Pt POC glucose reading \"high\" on monitor.      Allanesha Smith-Narcisse, RN  07/30/20 4304 Robotic RVATS, large exudative right pleural effusion drained, trapped lung, pleural biopsy (frozen-benign), partial decortication, tunneled PleurX catheter insertion

## 2021-03-02 NOTE — PACU DISCHARGE NOTE - NS MD DISCHARGE NOTE DISCHARGE
[facility]  Brief Operative Note    SUMMARY     Surgery Date: 3/1/2018     Surgeon(s) and Role:     * Lambert Villa Jr., MD - Primary     * Jj Parrish MD - Resident - Assisting    Pre-op Diagnosis:  Gallstones [K80.20]    Post-op Diagnosis:  Post-Op Diagnosis Codes:     * Gallstones [K80.20]    Procedure(s) (LRB):  CHOLECYSTECTOMY-LAPAROSCOPIC (N/A)    Anesthesia: General    Description of Procedure: Laparoscopic cholecystectomy    Description of the findings of the procedure: Long distended gallbladder with multiple large stones    Estimated Blood Loss: 5 mL    Total IV Fluids: Per Anesthesia         Specimens:   Specimen (12h ago through future)    Start     Ordered    03/01/18 1009  Specimen to Pathology - Surgery  Once     Comments:  1. Gallbladder- perm      03/01/18 1009           ICU

## 2021-03-03 ENCOUNTER — TRANSCRIPTION ENCOUNTER (OUTPATIENT)
Age: 86
End: 2021-03-03

## 2021-03-03 LAB
ALBUMIN SERPL ELPH-MCNC: 2.9 G/DL — LOW (ref 3.3–5)
ALP SERPL-CCNC: 85 U/L — SIGNIFICANT CHANGE UP (ref 40–120)
ALT FLD-CCNC: 12 U/L — SIGNIFICANT CHANGE UP (ref 4–41)
ANION GAP SERPL CALC-SCNC: 12 MMOL/L — SIGNIFICANT CHANGE UP (ref 7–14)
APTT BLD: 28.8 SEC — SIGNIFICANT CHANGE UP (ref 27–36.3)
AST SERPL-CCNC: 16 U/L — SIGNIFICANT CHANGE UP (ref 4–40)
BILIRUB SERPL-MCNC: 2 MG/DL — HIGH (ref 0.2–1.2)
BUN SERPL-MCNC: 20 MG/DL — SIGNIFICANT CHANGE UP (ref 7–23)
CALCIUM SERPL-MCNC: 8.3 MG/DL — LOW (ref 8.4–10.5)
CHLORIDE SERPL-SCNC: 100 MMOL/L — SIGNIFICANT CHANGE UP (ref 98–107)
CO2 SERPL-SCNC: 19 MMOL/L — LOW (ref 22–31)
CREAT SERPL-MCNC: 0.63 MG/DL — SIGNIFICANT CHANGE UP (ref 0.5–1.3)
GLUCOSE SERPL-MCNC: 150 MG/DL — HIGH (ref 70–99)
HCT VFR BLD CALC: 38.8 % — LOW (ref 39–50)
HGB BLD-MCNC: 12.2 G/DL — LOW (ref 13–17)
INR BLD: 1.12 RATIO — SIGNIFICANT CHANGE UP (ref 0.88–1.16)
MCHC RBC-ENTMCNC: 30.3 PG — SIGNIFICANT CHANGE UP (ref 27–34)
MCHC RBC-ENTMCNC: 31.4 GM/DL — LOW (ref 32–36)
MCV RBC AUTO: 96.3 FL — SIGNIFICANT CHANGE UP (ref 80–100)
NRBC # BLD: 0 /100 WBCS — SIGNIFICANT CHANGE UP
NRBC # FLD: 0 K/UL — SIGNIFICANT CHANGE UP
PLATELET # BLD AUTO: 116 K/UL — LOW (ref 150–400)
POTASSIUM SERPL-MCNC: 4.8 MMOL/L — SIGNIFICANT CHANGE UP (ref 3.5–5.3)
POTASSIUM SERPL-SCNC: 4.8 MMOL/L — SIGNIFICANT CHANGE UP (ref 3.5–5.3)
PROT SERPL-MCNC: 6.7 G/DL — SIGNIFICANT CHANGE UP (ref 6–8.3)
PROTHROM AB SERPL-ACNC: 12.7 SEC — SIGNIFICANT CHANGE UP (ref 10.6–13.6)
RBC # BLD: 4.03 M/UL — LOW (ref 4.2–5.8)
RBC # FLD: 14.1 % — SIGNIFICANT CHANGE UP (ref 10.3–14.5)
SODIUM SERPL-SCNC: 131 MMOL/L — LOW (ref 135–145)
WBC # BLD: 11.22 K/UL — HIGH (ref 3.8–10.5)
WBC # FLD AUTO: 11.22 K/UL — HIGH (ref 3.8–10.5)

## 2021-03-03 PROCEDURE — 99233 SBSQ HOSP IP/OBS HIGH 50: CPT

## 2021-03-03 PROCEDURE — 71045 X-RAY EXAM CHEST 1 VIEW: CPT | Mod: 26

## 2021-03-03 RX ORDER — ACETAMINOPHEN 500 MG
1000 TABLET ORAL ONCE
Refills: 0 | Status: COMPLETED | OUTPATIENT
Start: 2021-03-03 | End: 2021-03-03

## 2021-03-03 RX ORDER — ATORVASTATIN CALCIUM 80 MG/1
10 TABLET, FILM COATED ORAL AT BEDTIME
Refills: 0 | Status: DISCONTINUED | OUTPATIENT
Start: 2021-03-03 | End: 2021-03-08

## 2021-03-03 RX ORDER — ASPIRIN/CALCIUM CARB/MAGNESIUM 324 MG
81 TABLET ORAL DAILY
Refills: 0 | Status: DISCONTINUED | OUTPATIENT
Start: 2021-03-03 | End: 2021-03-08

## 2021-03-03 RX ORDER — PANTOPRAZOLE SODIUM 20 MG/1
40 TABLET, DELAYED RELEASE ORAL
Refills: 0 | Status: DISCONTINUED | OUTPATIENT
Start: 2021-03-03 | End: 2021-03-08

## 2021-03-03 RX ORDER — ACETAMINOPHEN 500 MG
650 TABLET ORAL EVERY 6 HOURS
Refills: 0 | Status: DISCONTINUED | OUTPATIENT
Start: 2021-03-03 | End: 2021-03-04

## 2021-03-03 RX ORDER — SODIUM CHLORIDE 9 MG/ML
1000 INJECTION, SOLUTION INTRAVENOUS
Refills: 0 | Status: DISCONTINUED | OUTPATIENT
Start: 2021-03-03 | End: 2021-03-05

## 2021-03-03 RX ADMIN — HEPARIN SODIUM 5000 UNIT(S): 5000 INJECTION INTRAVENOUS; SUBCUTANEOUS at 14:05

## 2021-03-03 RX ADMIN — ATENOLOL 50 MILLIGRAM(S): 25 TABLET ORAL at 18:20

## 2021-03-03 RX ADMIN — HYDROMORPHONE HYDROCHLORIDE 30 MILLILITER(S): 2 INJECTION INTRAMUSCULAR; INTRAVENOUS; SUBCUTANEOUS at 19:09

## 2021-03-03 RX ADMIN — ATORVASTATIN CALCIUM 10 MILLIGRAM(S): 80 TABLET, FILM COATED ORAL at 20:36

## 2021-03-03 RX ADMIN — HYDROMORPHONE HYDROCHLORIDE 30 MILLILITER(S): 2 INJECTION INTRAMUSCULAR; INTRAVENOUS; SUBCUTANEOUS at 07:26

## 2021-03-03 RX ADMIN — PANTOPRAZOLE SODIUM 40 MILLIGRAM(S): 20 TABLET, DELAYED RELEASE ORAL at 12:06

## 2021-03-03 RX ADMIN — ATENOLOL 50 MILLIGRAM(S): 25 TABLET ORAL at 06:42

## 2021-03-03 RX ADMIN — Medication 650 MILLIGRAM(S): at 12:06

## 2021-03-03 RX ADMIN — Medication 650 MILLIGRAM(S): at 18:20

## 2021-03-03 RX ADMIN — TAMSULOSIN HYDROCHLORIDE 0.4 MILLIGRAM(S): 0.4 CAPSULE ORAL at 20:36

## 2021-03-03 RX ADMIN — Medication 400 MILLIGRAM(S): at 01:00

## 2021-03-03 RX ADMIN — SODIUM CHLORIDE 30 MILLILITER(S): 9 INJECTION, SOLUTION INTRAVENOUS at 20:36

## 2021-03-03 RX ADMIN — HEPARIN SODIUM 5000 UNIT(S): 5000 INJECTION INTRAVENOUS; SUBCUTANEOUS at 06:43

## 2021-03-03 RX ADMIN — SODIUM CHLORIDE 30 MILLILITER(S): 9 INJECTION, SOLUTION INTRAVENOUS at 14:06

## 2021-03-03 RX ADMIN — Medication 81 MILLIGRAM(S): at 12:06

## 2021-03-03 RX ADMIN — HEPARIN SODIUM 5000 UNIT(S): 5000 INJECTION INTRAVENOUS; SUBCUTANEOUS at 20:35

## 2021-03-03 RX ADMIN — Medication 650 MILLIGRAM(S): at 14:00

## 2021-03-03 NOTE — PROGRESS NOTE ADULT - SUBJECTIVE AND OBJECTIVE BOX
POST ANESTHESIA EVALUATION    86y Male POSTOP DAY 1      MENTAL STATUS: Patient participation [x  ] Awake     [  ] Arousable     [  ] Sedated    AIRWAY PATENCY: [x  ] Satisfactory  [  ] Other:     Vital Signs Last 24 Hrs  T(C): 36.8 (03 Mar 2021 16:00), Max: 36.8 (03 Mar 2021 16:00)  T(F): 98.2 (03 Mar 2021 16:00), Max: 98.2 (03 Mar 2021 16:00)  HR: 68 (03 Mar 2021 16:00) (60 - 74)  BP: 132/70 (03 Mar 2021 16:00) (93/53 - 132/70)  BP(mean): 88 (03 Mar 2021 16:00) (66 - 88)  RR: 21 (03 Mar 2021 16:00) (15 - 25)  SpO2: 97% (03 Mar 2021 16:00) (94% - 100%)  I&O's Summary    02 Mar 2021 07:01  -  03 Mar 2021 07:00  --------------------------------------------------------  IN: 1175 mL / OUT: 930 mL / NET: 245 mL    03 Mar 2021 07:01  -  03 Mar 2021 18:21  --------------------------------------------------------  IN: 600 mL / OUT: 375 mL / NET: 225 mL          NAUSEA/ VOMITTING:  [x  ] NONE  [  ] CONTROLLED [  ] OTHER     PAIN: [x  ] CONTROLLED WITH CURRENT REGIMEN  [  ] OTHER    [x  ] NO APPARENT ANESTHESIA COMPLICATIONS      Comments:

## 2021-03-03 NOTE — DISCHARGE NOTE PROVIDER - NSDCFUADDAPPT_GEN_ALL_CORE_FT
See Dr Streeter in two weeks.  Call for an appointment and bring a new chest X-ray with you.   See Dr Streeter in two weeks, Next Thursday 3/11/2021  Call for an appointment and bring a new chest X-ray with you.   See Dr Streeter on THURSDAY March 18, 2021  9AM for chest xray  10AM for appointment with Dr Streeter  call  with any questions

## 2021-03-03 NOTE — DISCHARGE NOTE PROVIDER - HOSPITAL COURSE
This 86 year old male has a PMH of untreated TB, HTN, & an arrhythmia (On Eliquis - last pre-op dose on 2/25/21) and a PSH of PPM ([employer]) placement and a Left VATS, Robotic-Assisted Left Upper Lobectomy, Partial Pleurectomy, Left Lower Lobe Wedge Resection performed on 8/25/20.  Pathology revealed adenocarcinoma and a level 10 lymph node was positive for metastatic carcinoma.  He was admitted on 3/1/21 with a large right pleural effusion.  A CT chest on 2/20/21 had shown the presence of a large right pleural effusion with compressive atelectasis and the pt admitted to [facility] [place] when ambulating when seen in the office on 2/25/21.  Eliquis was held, and patient was referred for a TTE and PET scan in preparation for the Robotic- assisted Right VATS, Drainage of Pleural Effusion, Pleural Biopsy, Partial Decortication, and PleurX Catheter Placement which were performed on 3/2/21. Post-operatively, there was no air leak in either the R chest tube or the R PleurX , but the pt had subcutaneous emphysema and a PTX when the tubes were removed from suction.  After tolerating water seal, the pt's chest tube was removed and he was discharged home with home care for his PleurX.

## 2021-03-03 NOTE — PROGRESS NOTE ADULT - SUBJECTIVE AND OBJECTIVE BOX
PROCEDURE: Robotic RVATS, large exudative right pleural effusion drained, trapped lung, pleural biopsy (frozen-benign), partial decortication, tunneled PleurX catheter insertion  02-Mar-2021       ISSUES:   Pleural effusion  Lung cancer s/p LULobectomy (8/2020)  Post op pain  Chest tube in place  BPH  HLD  HTN  Dementia  Hx of tuberculosis (unclear history)  Glaucoma  Afib on apixaban.   s/p Pacemaker [employer], model [id number], serial [id number], date 7/1/2014      INTERVAL EVENTS:   No overnight events.  Airleak present. Chest tubes remain on suction      HISTORY:   Patient reports moderate pain at chest wall incision sites which is worse with coughing and deep breathing without associated fever or dyspnea. Pain is improved with use of pain meds.     PHYSICAL EXAM:   Gen: Comfortable, No acute distress  Eyes: Sclera white, Conjunctiva normal, Eyelids normal, Pupils symmetrical   ENT: Mucous membranes moist,  ,  ,    Neck: Trachea midline,  ,  ,  ,  ,    CV: Rate regular, Rhythm regular,  ,  ,    Resp: Breath sounds clear, No accessory muscles use, Two chest tubes,    Abd: Soft, Non-distended, Non-tender, Bowel sounds normal, feeding tube in place,  ,    Skin: Warm, No peripheral edema of lower extremities,  ,    : No merchant  Neuro: Moving all 4 extremities,    Psych: A&Ox3      ASSESSMENT AND PLAN:     NEURO:  Post-operative Pain - Pain control with PCA and Tylenol IV PRN.          RESPIRATORY:  Hypoxia - Wean nasal cannula for goal O2sat above 92. Obtain CXR. Incentive spirometry. Chest PT and frequent suctioning. Continue bronchodilators. OOB to chair & ambulate w/ assistance. Continuous pulse oximetry for support & to prevent decompensation.       Chest tube – Pleurevac regulated suctioning. Monitor chest tube output.            CARDIOVASCULAR:  Hemodynamically stable - Not on pressors. Continue hemodynamic monitoring.  HTN - stable. Continue home antihypertensives medications.     Paroxysmal Afib - stable. continue rhythm and rate control meds. Hold anticoagulation          RENAL:  Stable – LR IVF 30mL/hr. Monitor IOs and electrolytes.   BPH - Stable. Flomax qday      GASTROINTESTINAL:  GI prophylaxis not indicated  Zofran and Reglan IV PRN for nausea  Regular consistency diet          HEMATOLOGIC:  No signs of active bleeding. Monitor Hgb in CBC in AM  DVT prophylaxis with heparin subQ and SCDs.         INFECTIOUS DISEASE:  All surgical sites appear clean. Will monitor for fever and leukocytosis.         ENDOCRINE:  Stable – Monitor glucose fingersticks for goal 120-180.            Pertinent clinical, laboratory, radiographic, hemodynamic, echocardiographic, respiratory data, microbiologic data and chart were reviewed by myself and analyzed frequently throughout the course of the day and night by myself.    Plan discussed at length with the CTICU staff and Attending CT Surgeon.     Patient's status was discussed with patient at bedside.           ________________________________________________      _________________________  VITAL SIGNS:  Vital Signs Last 24 Hrs  T(C): 36.5 (03 Mar 2021 12:00), Max: 36.7 (02 Mar 2021 16:00)  T(F): 97.7 (03 Mar 2021 12:00), Max: 98.1 (02 Mar 2021 16:00)  HR: 69 (03 Mar 2021 14:00) (60 - 74)  BP: 112/58 (03 Mar 2021 14:00) (93/53 - 132/62)  BP(mean): 74 (03 Mar 2021 14:00) (66 - 102)  RR: 15 (03 Mar 2021 14:00) (14 - 25)  SpO2: 96% (03 Mar 2021 14:00) (94% - 100%)  I/Os:   I&O's Detail    02 Mar 2021 07:01  -  03 Mar 2021 07:00  --------------------------------------------------------  IN:    dextrose 5% + sodium chloride 0.45%: 975 mL    IV PiggyBack: 100 mL    Oral Fluid: 100 mL  Total IN: 1175 mL    OUT:    Chest Tube (mL): 270 mL    Chest Tube (mL): 190 mL    Voided (mL): 470 mL  Total OUT: 930 mL    Total NET: 245 mL      03 Mar 2021 07:01  -  03 Mar 2021 14:46  --------------------------------------------------------  IN:    dextrose 5% + sodium chloride 0.45%: 210 mL    Oral Fluid: 240 mL  Total IN: 450 mL    OUT:    Chest Tube (mL): 60 mL    Chest Tube (mL): 40 mL    Voided (mL): 175 mL  Total OUT: 275 mL    Total NET: 175 mL              MEDICATIONS:  MEDICATIONS  (STANDING):  acetaminophen   Tablet .. 650 milliGRAM(s) Oral every 6 hours  aspirin  chewable 81 milliGRAM(s) Oral daily  ATENolol  Tablet 50 milliGRAM(s) Oral every 12 hours  atorvastatin 10 milliGRAM(s) Oral at bedtime  heparin   Injectable 5000 Unit(s) SubCutaneous every 8 hours  HYDROmorphone PCA (1 mG/mL) 30 milliLiter(s) PCA Continuous PCA Continuous  lactated ringers. 1000 milliLiter(s) (30 mL/Hr) IV Continuous <Continuous>  pantoprazole    Tablet 40 milliGRAM(s) Oral before breakfast  tamsulosin 0.4 milliGRAM(s) Oral at bedtime    MEDICATIONS  (PRN):  HYDROmorphone PCA (1 mG/mL) Rescue Clinician Bolus 0.5 milliGRAM(s) IV Push every 15 minutes PRN for Pain Scale GREATER THAN 6  naloxone Injectable 0.1 milliGRAM(s) IV Push every 3 minutes PRN For ANY of the following changes in patient status:  A. RR LESS THAN 10 breaths per minute, B. Oxygen saturation LESS THAN 90%, C. Sedation score of 6  ondansetron Injectable 4 milliGRAM(s) IV Push every 6 hours PRN Nausea      LABS:                        12.2   11.22 )-----------( 116      ( 03 Mar 2021 05:40 )             38.8     03-03    131<L>  |  100  |  20  ----------------------------<  150<H>  4.8   |  19<L>  |  0.63    Ca    8.3<L>      03 Mar 2021 05:40    TPro  6.7  /  Alb  2.9<L>  /  TBili  2.0<H>  /  DBili  x   /  AST  16  /  ALT  12  /  AlkPhos  85  03-03    LIVER FUNCTIONS - ( 03 Mar 2021 05:40 )  Alb: 2.9 g/dL / Pro: 6.7 g/dL / ALK PHOS: 85 U/L / ALT: 12 U/L / AST: 16 U/L / GGT: x           PT/INR - ( 03 Mar 2021 05:40 )   PT: 12.7 sec;   INR: 1.12 ratio         PTT - ( 03 Mar 2021 05:40 )  PTT:28.8 sec      _________________________

## 2021-03-03 NOTE — DISCHARGE NOTE PROVIDER - CARE PROVIDERS DIRECT ADDRESSES
[email or web address]

## 2021-03-03 NOTE — PROGRESS NOTE ADULT - SUBJECTIVE AND OBJECTIVE BOX
Anesthesia Pain Management Service    SUBJECTIVE:  #101965. Patient is doing well with IV PCA and no significant problems reported. Denies nausea, tolerating diet.    Pain Scale Score	At rest: _1/10__ 	With Activity: ___ 	[X ] Refer to charted pain scores    THERAPY:    [ ] IV PCA Morphine		[ ] 5 mg/mL	[ ] 1 mg/mL  [X ] IV PCA Hydromorphone	[ ] 5 mg/mL	[X ] 1 mg/mL  [ ] IV PCA Fentanyl		[ ] 50 micrograms/mL    Demand dose __0.2_ lockout __6_ (minutes) Continuous Rate _0__ Total: __0.4_  mg used (in past 24 hours)      MEDICATIONS  (STANDING):  acetaminophen   Tablet .. 650 milliGRAM(s) Oral every 6 hours  ATENolol  Tablet 50 milliGRAM(s) Oral every 12 hours  dextrose 5% + sodium chloride 0.45%. 1000 milliLiter(s) (75 mL/Hr) IV Continuous <Continuous>  heparin   Injectable 5000 Unit(s) SubCutaneous every 8 hours  HYDROmorphone PCA (1 mG/mL) 30 milliLiter(s) PCA Continuous PCA Continuous  tamsulosin 0.4 milliGRAM(s) Oral at bedtime    MEDICATIONS  (PRN):  HYDROmorphone PCA (1 mG/mL) Rescue Clinician Bolus 0.5 milliGRAM(s) IV Push every 15 minutes PRN for Pain Scale GREATER THAN 6  naloxone Injectable 0.1 milliGRAM(s) IV Push every 3 minutes PRN For ANY of the following changes in patient status:  A. RR LESS THAN 10 breaths per minute, B. Oxygen saturation LESS THAN 90%, C. Sedation score of 6  ondansetron Injectable 4 milliGRAM(s) IV Push every 6 hours PRN Nausea      OBJECTIVE: Pt sitting in chair comfortably eating breakfast with 2 chest tubes.    Sedation Score:	[ X] Alert	[ ] Drowsy 	[ ] Arousable	[ ] Asleep	[ ] Unresponsive    Side Effects:	[X ] None	[ ] Nausea	[ ] Vomiting	[ ] Pruritus  		[ ] Other:    Vital Signs Last 24 Hrs  T(C): 36.3 (03 Mar 2021 04:00), Max: 37.3 (02 Mar 2021 09:35)  T(F): 97.4 (03 Mar 2021 04:00), Max: 99.1 (02 Mar 2021 09:35)  HR: 60 (03 Mar 2021 06:00) (60 - 89)  BP: 116/56 (03 Mar 2021 06:00) (99/54 - 132/62)  BP(mean): 74 (03 Mar 2021 06:00) (67 - 102)  RR: 16 (03 Mar 2021 06:00) (14 - 21)  SpO2: 98% (03 Mar 2021 06:00) (95% - 100%)    ASSESSMENT/ PLAN    Therapy to  be:	[ X] Continue   [ ] Discontinued   [ ] Change to prn Analgesics    Documentation and Verification of current medications:   [X] Done	[ ] Not done, not elligible    Comments: Recommend non-opioid adjuvant analgesics to be used when possible and when allowed by primary surgical team.    Progress Note written now but Patient was seen earlier.

## 2021-03-03 NOTE — PROGRESS NOTE ADULT - SUBJECTIVE AND OBJECTIVE BOX
ANESTHESIA POSTOP CHECK    86y Male POSTOP DAY 1 S/P R VATS/ decortication    Vital Signs Last 24 Hrs  T(C): 36.3 (03 Mar 2021 04:00), Max: 37.3 (02 Mar 2021 09:35)  T(F): 97.4 (03 Mar 2021 04:00), Max: 99.1 (02 Mar 2021 09:35)  HR: 60 (03 Mar 2021 06:00) (60 - 89)  BP: 116/56 (03 Mar 2021 06:00) (99/54 - 132/62)  BP(mean): 74 (03 Mar 2021 06:00) (67 - 102)  RR: 16 (03 Mar 2021 06:00) (14 - 21)  SpO2: 98% (03 Mar 2021 06:00) (95% - 100%)  I&O's Summary    02 Mar 2021 07:01  -  03 Mar 2021 07:00  --------------------------------------------------------  IN: 1175 mL / OUT: 930 mL / NET: 245 mL        [X ] NO APPARENT ANESTHESIA COMPLICATIONS      Comments:

## 2021-03-03 NOTE — DISCHARGE NOTE PROVIDER - NSDCCPTREATMENT_GEN_ALL_CORE_FT
PRINCIPAL PROCEDURE  Procedure: Decortication, lung, partial  Findings and Treatment:       SECONDARY PROCEDURE  Procedure: Insertion, PleurX catheter system, pleural  Findings and Treatment:     Procedure: Pleural biopsy  Findings and Treatment:

## 2021-03-03 NOTE — DISCHARGE NOTE PROVIDER - NSDCFUADDINST_GEN_ALL_CORE_FT
Keep the wound clean and dry and uncovered.  Shower and allow the wound to dry.  Watch for fevers, difficulty breathing, increased redness or pus at the wound and if noted, call Dr Streeter.

## 2021-03-03 NOTE — DISCHARGE NOTE PROVIDER - NSDCCPCAREPLAN_GEN_ALL_CORE_FT
PRINCIPAL DISCHARGE DIAGNOSIS  Diagnosis: Pleural effusion  Assessment and Plan of Treatment: Pleural effusion

## 2021-03-03 NOTE — DISCHARGE NOTE PROVIDER - CARE PROVIDER_API CALL
Sebas Streeter (MD)  Surgery; Thoracic Surgery  [phone] [place] [address]  Phone: [phone]  Fax: [phone]  Established Patient  Follow Up Time:

## 2021-03-03 NOTE — DISCHARGE NOTE PROVIDER - NSDCMRMEDTOKEN_GEN_ALL_CORE_FT
alendronate 70 mg oral tablet: 1 tab(s) orally once a week  aspirin 81 mg oral tablet: 1 tab(s) orally once a day  atenolol 100 mg oral tablet: 1 tab(s) orally every 12 hours  Eliquis 2.5 mg oral tablet: 1 tab(s) orally 2 times a day  Lasix 20 mg oral tablet: 1 tab(s) orally once a day  memantine 10 mg oral tablet: 1 tab(s) orally 2 times a day  ofloxacin 0.3% ophthalmic solution: 1 drop(s) to each affected eye 4 times a day  omeprazole 40 mg oral delayed release capsule: 1 cap(s) orally once a day  pravastatin 40 mg oral tablet: 1 tab(s) orally once a day  Restasis 0.05% ophthalmic emulsion: 1 drop(s) to each affected eye every 12 hours  Tagrisso 80 mg oral tablet:   tamsulosin 0.4 mg oral capsule: 1 cap(s) orally once a day   alendronate 70 mg oral tablet: 1 tab(s) orally once a week  aspirin 81 mg oral tablet: 1 tab(s) orally once a day  atenolol 100 mg oral tablet: 1 tab(s) orally every 12 hours  chest xray PA and lateral: Dx s/p RVATS, drainage of effusion, pleurX  send films to Dr Ferdinand Grayson 2.5 mg oral tablet: 1 tab(s) orally 2 times a day  Lasix 20 mg oral tablet: 1 tab(s) orally once a day  memantine 10 mg oral tablet: 1 tab(s) orally 2 times a day  ofloxacin 0.3% ophthalmic solution: 1 drop(s) to each affected eye 4 times a day  omeprazole 40 mg oral delayed release capsule: 1 cap(s) orally once a day  oxyCODONE 5 mg oral tablet: 1 tab(s) orally every 4 hours while awake, As Needed -Moderate to Severe Pain (4 - 10) MDD:6  pravastatin 40 mg oral tablet: 1 tab(s) orally once a day  Restasis 0.05% ophthalmic emulsion: 1 drop(s) to each affected eye every 12 hours  Tagrisso 80 mg oral tablet:   tamsulosin 0.4 mg oral capsule: 1 cap(s) orally once a day   alendronate 70 mg oral tablet: 1 tab(s) orally once a week  aspirin 81 mg oral tablet: 1 tab(s) orally once a day  atenolol 100 mg oral tablet: 1 tab(s) orally every 12 hours  chest xray PA and lateral: Dx s/p RVATS, drainage of effusion, pleurX  send films to Dr Ferdinand Grayson 2.5 mg oral tablet: 1 tab(s) orally 2 times a day  Lasix 20 mg oral tablet: 1 tab(s) orally once a day  memantine 10 mg oral tablet: 1 tab(s) orally 2 times a day  ofloxacin 0.3% ophthalmic solution: 1 drop(s) to each affected eye 4 times a day  omeprazole 40 mg oral delayed release capsule: 1 cap(s) orally once a day  oxyCODONE 5 mg oral tablet: 1 tab(s) orally every 4 hours while awake, As Needed -Moderate to Severe Pain (4 - 10) MDD:6  polyethylene glycol 3350 oral powder for reconstitution: 17 gram(s) orally once a day  pravastatin 40 mg oral tablet: 1 tab(s) orally once a day  Restasis 0.05% ophthalmic emulsion: 1 drop(s) to each affected eye every 12 hours  senna oral tablet: 2 tab(s) orally once a day (at bedtime)  Tagrisso 80 mg oral tablet:   tamsulosin 0.4 mg oral capsule: 1 cap(s) orally once a day  Tylenol 325 mg oral tablet: 2 tab(s) orally every 4 hours as needed for pain control

## 2021-03-04 ENCOUNTER — TRANSCRIPTION ENCOUNTER (OUTPATIENT)
Age: 86
End: 2021-03-04

## 2021-03-04 LAB
ANION GAP SERPL CALC-SCNC: 10 MMOL/L — SIGNIFICANT CHANGE UP (ref 7–14)
APTT BLD: 25.4 SEC — LOW (ref 27–36.3)
BUN SERPL-MCNC: 20 MG/DL — SIGNIFICANT CHANGE UP (ref 7–23)
CALCIUM SERPL-MCNC: 8.1 MG/DL — LOW (ref 8.4–10.5)
CHLORIDE SERPL-SCNC: 102 MMOL/L — SIGNIFICANT CHANGE UP (ref 98–107)
CO2 SERPL-SCNC: 23 MMOL/L — SIGNIFICANT CHANGE UP (ref 22–31)
CREAT SERPL-MCNC: 0.66 MG/DL — SIGNIFICANT CHANGE UP (ref 0.5–1.3)
GLUCOSE SERPL-MCNC: 125 MG/DL — HIGH (ref 70–99)
HCT VFR BLD CALC: 34.1 % — LOW (ref 39–50)
HGB BLD-MCNC: 10.8 G/DL — LOW (ref 13–17)
INR BLD: 1.12 RATIO — SIGNIFICANT CHANGE UP (ref 0.88–1.16)
MAGNESIUM SERPL-MCNC: 2 MG/DL — SIGNIFICANT CHANGE UP (ref 1.6–2.6)
MCHC RBC-ENTMCNC: 30.4 PG — SIGNIFICANT CHANGE UP (ref 27–34)
MCHC RBC-ENTMCNC: 31.7 GM/DL — LOW (ref 32–36)
MCV RBC AUTO: 96.1 FL — SIGNIFICANT CHANGE UP (ref 80–100)
NRBC # BLD: 0 /100 WBCS — SIGNIFICANT CHANGE UP
NRBC # FLD: 0 K/UL — SIGNIFICANT CHANGE UP
PHOSPHATE SERPL-MCNC: 1.4 MG/DL — LOW (ref 2.5–4.5)
PLATELET # BLD AUTO: 157 K/UL — SIGNIFICANT CHANGE UP (ref 150–400)
POTASSIUM SERPL-MCNC: 3.9 MMOL/L — SIGNIFICANT CHANGE UP (ref 3.5–5.3)
POTASSIUM SERPL-SCNC: 3.9 MMOL/L — SIGNIFICANT CHANGE UP (ref 3.5–5.3)
PROTHROM AB SERPL-ACNC: 12.7 SEC — SIGNIFICANT CHANGE UP (ref 10.6–13.6)
RBC # BLD: 3.55 M/UL — LOW (ref 4.2–5.8)
RBC # FLD: 14 % — SIGNIFICANT CHANGE UP (ref 10.3–14.5)
SODIUM SERPL-SCNC: 135 MMOL/L — SIGNIFICANT CHANGE UP (ref 135–145)
WBC # BLD: 6.88 K/UL — SIGNIFICANT CHANGE UP (ref 3.8–10.5)
WBC # FLD AUTO: 6.88 K/UL — SIGNIFICANT CHANGE UP (ref 3.8–10.5)

## 2021-03-04 PROCEDURE — 71045 X-RAY EXAM CHEST 1 VIEW: CPT | Mod: 26,77

## 2021-03-04 PROCEDURE — 71045 X-RAY EXAM CHEST 1 VIEW: CPT | Mod: 26

## 2021-03-04 PROCEDURE — 99233 SBSQ HOSP IP/OBS HIGH 50: CPT

## 2021-03-04 RX ORDER — OXYCODONE HYDROCHLORIDE 5 MG/1
5 TABLET ORAL
Refills: 0 | Status: DISCONTINUED | OUTPATIENT
Start: 2021-03-04 | End: 2021-03-08

## 2021-03-04 RX ORDER — SODIUM,POTASSIUM PHOSPHATES 278-250MG
2 POWDER IN PACKET (EA) ORAL EVERY 6 HOURS
Refills: 0 | Status: COMPLETED | OUTPATIENT
Start: 2021-03-04 | End: 2021-03-04

## 2021-03-04 RX ORDER — LIDOCAINE 4 G/100G
1 CREAM TOPICAL EVERY 24 HOURS
Refills: 0 | Status: DISCONTINUED | OUTPATIENT
Start: 2021-03-04 | End: 2021-03-08

## 2021-03-04 RX ORDER — HYDROMORPHONE HYDROCHLORIDE 2 MG/ML
0.3 INJECTION INTRAMUSCULAR; INTRAVENOUS; SUBCUTANEOUS
Refills: 0 | Status: DISCONTINUED | OUTPATIENT
Start: 2021-03-04 | End: 2021-03-08

## 2021-03-04 RX ORDER — ACETAMINOPHEN 500 MG
650 TABLET ORAL EVERY 6 HOURS
Refills: 0 | Status: COMPLETED | OUTPATIENT
Start: 2021-03-04 | End: 2021-03-06

## 2021-03-04 RX ADMIN — Medication 2 TABLET(S): at 11:09

## 2021-03-04 RX ADMIN — Medication 81 MILLIGRAM(S): at 11:09

## 2021-03-04 RX ADMIN — LIDOCAINE 1 PATCH: 4 CREAM TOPICAL at 11:09

## 2021-03-04 RX ADMIN — LIDOCAINE 1 PATCH: 4 CREAM TOPICAL at 22:07

## 2021-03-04 RX ADMIN — PANTOPRAZOLE SODIUM 40 MILLIGRAM(S): 20 TABLET, DELAYED RELEASE ORAL at 05:51

## 2021-03-04 RX ADMIN — Medication 2 TABLET(S): at 17:14

## 2021-03-04 RX ADMIN — ATENOLOL 50 MILLIGRAM(S): 25 TABLET ORAL at 17:12

## 2021-03-04 RX ADMIN — Medication 650 MILLIGRAM(S): at 17:12

## 2021-03-04 RX ADMIN — ATORVASTATIN CALCIUM 10 MILLIGRAM(S): 80 TABLET, FILM COATED ORAL at 21:20

## 2021-03-04 RX ADMIN — ATENOLOL 50 MILLIGRAM(S): 25 TABLET ORAL at 05:50

## 2021-03-04 RX ADMIN — Medication 650 MILLIGRAM(S): at 23:11

## 2021-03-04 RX ADMIN — TAMSULOSIN HYDROCHLORIDE 0.4 MILLIGRAM(S): 0.4 CAPSULE ORAL at 21:20

## 2021-03-04 RX ADMIN — Medication 650 MILLIGRAM(S): at 03:08

## 2021-03-04 RX ADMIN — Medication 650 MILLIGRAM(S): at 11:09

## 2021-03-04 RX ADMIN — LIDOCAINE 1 PATCH: 4 CREAM TOPICAL at 19:53

## 2021-03-04 RX ADMIN — Medication 2 TABLET(S): at 23:11

## 2021-03-04 RX ADMIN — Medication 650 MILLIGRAM(S): at 17:15

## 2021-03-04 RX ADMIN — HEPARIN SODIUM 5000 UNIT(S): 5000 INJECTION INTRAVENOUS; SUBCUTANEOUS at 05:50

## 2021-03-04 RX ADMIN — HEPARIN SODIUM 5000 UNIT(S): 5000 INJECTION INTRAVENOUS; SUBCUTANEOUS at 15:00

## 2021-03-04 RX ADMIN — Medication 650 MILLIGRAM(S): at 11:14

## 2021-03-04 RX ADMIN — HEPARIN SODIUM 5000 UNIT(S): 5000 INJECTION INTRAVENOUS; SUBCUTANEOUS at 21:21

## 2021-03-04 NOTE — PROGRESS NOTE ADULT - SUBJECTIVE AND OBJECTIVE BOX
Anesthesia Pain Management Service    SUBJECTIVE: Patient is doing well with IV PCA and no significant problems reported.    Pain Scale Score	At rest: __0/10_ 	With Activity: ___ 	[X ] Refer to charted pain scores    THERAPY:    [ ] IV PCA Morphine		[ ] 5 mg/mL	[ ] 1 mg/mL  [X ] IV PCA Hydromorphone	[ ] 5 mg/mL	[X ] 1 mg/mL  [ ] IV PCA Fentanyl		[ ] 50 micrograms/mL    Demand dose __0.2_ lockout __6_ (minutes) Continuous Rate _0__ Total: ___0.2   mg used (in past 24 hrs)      MEDICATIONS  (STANDING):  acetaminophen   Tablet .. 650 milliGRAM(s) Oral every 6 hours  aspirin  chewable 81 milliGRAM(s) Oral daily  ATENolol  Tablet 50 milliGRAM(s) Oral every 12 hours  atorvastatin 10 milliGRAM(s) Oral at bedtime  heparin   Injectable 5000 Unit(s) SubCutaneous every 8 hours  lactated ringers. 1000 milliLiter(s) (30 mL/Hr) IV Continuous <Continuous>  lidocaine   Patch 1 Patch Transdermal every 24 hours  pantoprazole    Tablet 40 milliGRAM(s) Oral before breakfast  potassium phosphate / sodium phosphate Tablet (K-PHOS No. 2) 2 Tablet(s) Oral every 6 hours  tamsulosin 0.4 milliGRAM(s) Oral at bedtime    MEDICATIONS  (PRN):  HYDROmorphone  Injectable 0.3 milliGRAM(s) IV Push every 3 hours PRN Severe breakthrough Pain (7 - 10)  naloxone Injectable 0.1 milliGRAM(s) IV Push every 3 minutes PRN For ANY of the following changes in patient status:  A. RR LESS THAN 10 breaths per minute, B. Oxygen saturation LESS THAN 90%, C. Sedation score of 6  ondansetron Injectable 4 milliGRAM(s) IV Push every 6 hours PRN Nausea  oxyCODONE    IR 5 milliGRAM(s) Oral every 3 hours PRN Moderate to Severe Pain (4 - 10)      OBJECTIVE:  Patient is sitting up in chair with chest tube x 2.    Sedation Score:	[ X] Alert	[ ] Drowsy 	[ ] Arousable	[ ] Asleep	[ ] Unresponsive    Side Effects:	[X ] None	[ ] Nausea	[ ] Vomiting	[ ] Pruritus  		[ ] Other:    Vital Signs Last 24 Hrs  T(C): 36.3 (04 Mar 2021 04:00), Max: 36.8 (03 Mar 2021 16:00)  T(F): 97.4 (04 Mar 2021 04:00), Max: 98.2 (03 Mar 2021 16:00)  HR: 61 (04 Mar 2021 06:00) (60 - 78)  BP: 105/56 (04 Mar 2021 06:00) (93/53 - 141/75)  BP(mean): 71 (04 Mar 2021 06:00) (66 - 94)  RR: 20 (04 Mar 2021 06:00) (15 - 25)  SpO2: 97% (04 Mar 2021 06:00) (95% - 98%)    ASSESSMENT/ PLAN    Therapy to  be:	[ ] Continue   [ X] Discontinued   [X ] Change to prn Analgesics    Documentation and Verification of current medications:   [X] Done	[ ] Not done, not elligible    Comments: Discussed patient with team and IV Dilaudid PCA discontinued. PRN Oral/IV opioids and/or Adjuvant non-opioid medication to be ordered at this point.    Progress Note written now but Patient was seen earlier.

## 2021-03-04 NOTE — DISCHARGE NOTE NURSING/CASE MANAGEMENT/SOCIAL WORK - PATIENT PORTAL LINK FT
You can access the FollowMyHealth Patient Portal offered by [facility] by registering at the following website: [email or web address]. By joining [employer]’s FollowMyHealth portal, you will also be able to view your health information using other applications (apps) compatible with our system.

## 2021-03-04 NOTE — DISCHARGE NOTE NURSING/CASE MANAGEMENT/SOCIAL WORK - NSSCNAMETXT_GEN_ALL_CORE
[facility] [phone] initial visit will be day after discharge home. A nurse will call prior to the home visit.

## 2021-03-04 NOTE — DISCHARGE NOTE NURSING/CASE MANAGEMENT/SOCIAL WORK - NSDCFUADDAPPT_GEN_ALL_CORE_FT
See Dr Streeter on THURSDAY March 18, 2021  9AM for chest xray  10AM for appointment with Dr Streeter  call  with any questions

## 2021-03-04 NOTE — DISCHARGE NOTE NURSING/CASE MANAGEMENT/SOCIAL WORK - NSSCCARECORD_GEN_ALL_CORE
Home Care Agency/Durable Medical Equipment Agency Home Care Agency/Durable Medical Equipment Agency/[facility]

## 2021-03-04 NOTE — PROGRESS NOTE ADULT - SUBJECTIVE AND OBJECTIVE BOX
Anesthesia Pain Management Service- Attending Addendum    SUBJECTIVE: Patient's pain control adequate    Therapy:	  [ X] IV PCA	   [ ] Epidural           [ ] s/p Spinal Opoid              [ ] Postpartum infusion	  [ ] Patient controlled regional anesthesia (PCRA)    [ ] prn Analgesics    Allergies    fish (Other (Mild))  No Known Drug Allergies  shellfish (Other (Mild))    Intolerances      MEDICATIONS  (STANDING):  acetaminophen   Tablet .. 650 milliGRAM(s) Oral every 6 hours  aspirin  chewable 81 milliGRAM(s) Oral daily  ATENolol  Tablet 50 milliGRAM(s) Oral every 12 hours  atorvastatin 10 milliGRAM(s) Oral at bedtime  heparin   Injectable 5000 Unit(s) SubCutaneous every 8 hours  lactated ringers. 1000 milliLiter(s) (30 mL/Hr) IV Continuous <Continuous>  lidocaine   Patch 1 Patch Transdermal every 24 hours  pantoprazole    Tablet 40 milliGRAM(s) Oral before breakfast  potassium phosphate / sodium phosphate Tablet (K-PHOS No. 2) 2 Tablet(s) Oral every 6 hours  tamsulosin 0.4 milliGRAM(s) Oral at bedtime    MEDICATIONS  (PRN):  HYDROmorphone  Injectable 0.3 milliGRAM(s) IV Push every 3 hours PRN Severe breakthrough Pain (7 - 10)  naloxone Injectable 0.1 milliGRAM(s) IV Push every 3 minutes PRN For ANY of the following changes in patient status:  A. RR LESS THAN 10 breaths per minute, B. Oxygen saturation LESS THAN 90%, C. Sedation score of 6  ondansetron Injectable 4 milliGRAM(s) IV Push every 6 hours PRN Nausea  oxyCODONE    IR 5 milliGRAM(s) Oral every 3 hours PRN Moderate to Severe Pain (4 - 10)      OBJECTIVE:   [X] No new signs     [ ] Other:    Side Effects:  [X ] None			[ ] Other:      ASSESSMENT/PLAN  -Discontinue current therapy    [ ] Therapy changed to:    [ ] IV PCA       [ ] Epidural     [ X] prn Analgesics     Comments: Pain management per primary team, APS to sign off

## 2021-03-04 NOTE — PROGRESS NOTE ADULT - SUBJECTIVE AND OBJECTIVE BOX
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  Patient had CT chest 2/20/21 revealing large right pleural effusion with compressive atelectasis, and admitted to [facility] when ambulating when seen in office on 2/25/21.  Eliquis was held, and patient was referred for TTE (Dr. Montgomery) and PET scan this past weekend in preparation for Right VATS, Robotic-Assisted Pleural Biopsy, Drainage of Pleural Effusion, Possible Pleurodesis and PleurX Catheter Placement tomorrow.  With the help of , patient states that he is comfortable at rest, and denies any chest pains. (01 Mar 2021 10:55)        PROCEDURE: Robotic RVATS, large exudative right pleural effusion drained, trapped lung, pleural biopsy (frozen-benign), partial decortication, tunneled PleurX catheter insertion  02-Mar-2021     ISSUES:   Pleural effusion  Lung cancer s/p LULobectomy (8/2020)  Post op pain  Chest tube in place  BPH  HLD  HTN  Dementia  Hx of tuberculosis (unclear history)  Glaucoma  Afib on apixaban.   s/p Pacemaker [employer], model [id number], serial [id number], date 7/1/2014    PAST MEDICAL & SURGICAL HISTORY:  Glaucoma    Tuberculosis    Dementia    Arthritis    History of BPH    Malignant neoplasm of unspecified part of left bronchus or lung    Hyperlipidemia    Hypertension    S/P lobectomy of lung    History of lung biopsy    Pacemaker  [employer]  model [id number]  serial [id number]  date 7/1/2014    S/P total knee replacement, left              VITAL SIGNS:  Vital Signs Last 24 Hrs  T(C): 36.2 (04 Mar 2021 08:00), Max: 36.8 (03 Mar 2021 16:00)  T(F): 97.2 (04 Mar 2021 08:00), Max: 98.2 (03 Mar 2021 16:00)  HR: 60 (04 Mar 2021 11:00) (60 - 78)  BP: 121/60 (04 Mar 2021 11:00) (92/52 - 141/75)  BP(mean): 77 (04 Mar 2021 11:00) (65 - 94)  RR: 18 (04 Mar 2021 11:00) (15 - 25)  SpO2: 96% (04 Mar 2021 11:00) (95% - 98%)    I/Os:   I&O's Detail    03 Mar 2021 07:01  -  04 Mar 2021 07:00  --------------------------------------------------------  IN:    dextrose 5% + sodium chloride 0.45%: 270 mL    Lactated Ringers: 510 mL    Oral Fluid: 540 mL  Total IN: 1320 mL    OUT:    Chest Tube (mL): 60 mL    Chest Tube (mL): 200 mL    Voided (mL): 975 mL  Total OUT: 1235 mL    Total NET: 85 mL      04 Mar 2021 07:01  -  04 Mar 2021 12:40  --------------------------------------------------------  IN:  Total IN: 0 mL    OUT:    Voided (mL): 100 mL  Total OUT: 100 mL    Total NET: -100 mL          CAPILLARY BLOOD GLUCOSE          =======================MEDICATIONS===================  MEDICATIONS  (STANDING):  acetaminophen   Tablet .. 650 milliGRAM(s) Oral every 6 hours  aspirin  chewable 81 milliGRAM(s) Oral daily  ATENolol  Tablet 50 milliGRAM(s) Oral every 12 hours  atorvastatin 10 milliGRAM(s) Oral at bedtime  heparin   Injectable 5000 Unit(s) SubCutaneous every 8 hours  lactated ringers. 1000 milliLiter(s) (30 mL/Hr) IV Continuous <Continuous>  lidocaine   Patch 1 Patch Transdermal every 24 hours  pantoprazole    Tablet 40 milliGRAM(s) Oral before breakfast  potassium phosphate / sodium phosphate Tablet (K-PHOS No. 2) 2 Tablet(s) Oral every 6 hours  tamsulosin 0.4 milliGRAM(s) Oral at bedtime    MEDICATIONS  (PRN):  HYDROmorphone  Injectable 0.3 milliGRAM(s) IV Push every 3 hours PRN Severe breakthrough Pain (7 - 10)  naloxone Injectable 0.1 milliGRAM(s) IV Push every 3 minutes PRN For ANY of the following changes in patient status:  A. RR LESS THAN 10 breaths per minute, B. Oxygen saturation LESS THAN 90%, C. Sedation score of 6  ondansetron Injectable 4 milliGRAM(s) IV Push every 6 hours PRN Nausea  oxyCODONE    IR 5 milliGRAM(s) Oral every 3 hours PRN Moderate to Severe Pain (4 - 10)        PHYSICAL EXAM============================  General:                         Awake, alert, not in any distress  Neuro:                            Moving all extremities to commands.   Respiratory:	Air entry fair and  bilateral conducted sounds                                           Effort even and unlabored.  CV:		Regular rate and rhythm. Normal S1/S2                                          Distal pulses present.  Abdomen:	                     Soft, non-distended. Bowel sounds present   Skin:		No rash.  Extremities:	Warm, no cyanosis or edema.  Palpable pulses    ============================LABS=========================                        10.8   6.88  )-----------( 157      ( 04 Mar 2021 06:18 )             34.1     03-04    135  |  102  |  20  ----------------------------<  125<H>  3.9   |  23  |  0.66    Ca    8.1<L>      04 Mar 2021 06:18  Phos  1.4     03-04  Mg     2.0     03-04    TPro  6.7  /  Alb  2.9<L>  /  TBili  2.0<H>  /  DBili  x   /  AST  16  /  ALT  12  /  AlkPhos  85  03-03    LIVER FUNCTIONS - ( 03 Mar 2021 05:40 )  Alb: 2.9 g/dL / Pro: 6.7 g/dL / ALK PHOS: 85 U/L / ALT: 12 U/L / AST: 16 U/L / GGT: x           PT/INR - ( 04 Mar 2021 06:18 )   PT: 12.7 sec;   INR: 1.12 ratio         PTT - ( 04 Mar 2021 06:18 )  PTT:25.4 sec      ASSESSMENT AND PLAN:     NEURO:  Post-operative Pain - Pain control with PCA and Tylenol IV PRN.   Nonfocal       RESPIRATORY:  Hypoxia - Wean nasal cannula for goal O2sat above 92. Obtain CXR. Incentive spirometry. Chest PT and frequent suctioning. Continue bronchodilators. OOB to chair & ambulate w/ assistance. Continuous pulse oximetry for support & to prevent decompensation.       Chest tube – Pleurevac regulated suctioning. Monitor chest tube output.         CARDIOVASCULAR:  Hemodynamically stable - Not on pressors. Continue hemodynamic monitoring.  HTN - stable. Continue home antihypertensives medications.     Paroxysmal Afib - stable. continue rhythm and rate control meds. Hold anticoagulation       RENAL:  Stable – LR IVF  Monitor IOs and electrolytes.   BPH - Stable. Flomax qday      GASTROINTESTINAL:  GI prophylaxis not indicated  Zofran and Reglan IV PRN for nausea  Regular consistency diet          HEMATOLOGIC:  No signs of active bleeding. Monitor Hgb in CBC in AM  DVT prophylaxis with heparin subQ and SCDs.         INFECTIOUS DISEASE:  All surgical sites appear clean. Will monitor for fever and leukocytosis.         ENDOCRINE:  Stable – Monitor glucose fingersticks for goal 120-180.            Pertinent clinical, laboratory, radiographic, hemodynamic, echocardiographic, respiratory data, microbiologic data and chart were reviewed by myself and analyzed frequently throughout the course of the day and night by myself.    Plan discussed at length with the CTICU staff and Attending CT Surgeon.     Patient's status was discussed with patient at bedside.             Juan Bautista DO FACEP

## 2021-03-04 NOTE — DIETITIAN INITIAL EVALUATION ADULT. - OTHER INFO
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  Patient had CT chest 2/20/21 revealing large right pleural effusion with compressive atelectasis, and admitted to [facility] when ambulating when seen in office on 2/25/21.  Eliquis was held, and patient was referred for TTE (Dr. Montgomery) and PET scan this past weekend in preparation for Right VATS, Robotic-Assisted Pleural Biopsy, Drainage of Pleural Effusion, Possible Pleurodesis and PleurX Catheter Placement.

## 2021-03-04 NOTE — DIETITIAN INITIAL EVALUATION ADULT. - ORAL INTAKE PTA/DIET HISTORY
Information obtained from pt's son, Kirby.  Stated on chart that pt is allergic to fish and shellfish.  Son states that they do not know if it is a true allergy vs an aversion to the smell of fish.  Pt is w/o difficulty chewing/swallowing.  No modified diet PTA.  As per son, pt has lost a lot of weight, which he states is due to medication, which causes him to lose water.  Son also notes that pt's food intake is less than 75% of usual.  Pt w/recent [facility] admission-pt w/+wt loss since 8/2020

## 2021-03-05 LAB
ALBUMIN SERPL ELPH-MCNC: 3.2 G/DL — LOW (ref 3.3–5)
ALP SERPL-CCNC: 78 U/L — SIGNIFICANT CHANGE UP (ref 40–120)
ALT FLD-CCNC: 25 U/L — SIGNIFICANT CHANGE UP (ref 4–41)
ANION GAP SERPL CALC-SCNC: 10 MMOL/L — SIGNIFICANT CHANGE UP (ref 7–14)
AST SERPL-CCNC: 24 U/L — SIGNIFICANT CHANGE UP (ref 4–40)
BILIRUB SERPL-MCNC: 1.1 MG/DL — SIGNIFICANT CHANGE UP (ref 0.2–1.2)
BUN SERPL-MCNC: 14 MG/DL — SIGNIFICANT CHANGE UP (ref 7–23)
CALCIUM SERPL-MCNC: 8.5 MG/DL — SIGNIFICANT CHANGE UP (ref 8.4–10.5)
CHLORIDE SERPL-SCNC: 101 MMOL/L — SIGNIFICANT CHANGE UP (ref 98–107)
CO2 SERPL-SCNC: 26 MMOL/L — SIGNIFICANT CHANGE UP (ref 22–31)
CREAT SERPL-MCNC: 0.65 MG/DL — SIGNIFICANT CHANGE UP (ref 0.5–1.3)
GLUCOSE SERPL-MCNC: 106 MG/DL — HIGH (ref 70–99)
HCT VFR BLD CALC: 37.1 % — LOW (ref 39–50)
HGB BLD-MCNC: 12.1 G/DL — LOW (ref 13–17)
MAGNESIUM SERPL-MCNC: 2 MG/DL — SIGNIFICANT CHANGE UP (ref 1.6–2.6)
MCHC RBC-ENTMCNC: 30.5 PG — SIGNIFICANT CHANGE UP (ref 27–34)
MCHC RBC-ENTMCNC: 32.6 GM/DL — SIGNIFICANT CHANGE UP (ref 32–36)
MCV RBC AUTO: 93.5 FL — SIGNIFICANT CHANGE UP (ref 80–100)
NRBC # BLD: 0 /100 WBCS — SIGNIFICANT CHANGE UP
NRBC # FLD: 0 K/UL — SIGNIFICANT CHANGE UP
PHOSPHATE SERPL-MCNC: 3.4 MG/DL — SIGNIFICANT CHANGE UP (ref 2.5–4.5)
PLATELET # BLD AUTO: 160 K/UL — SIGNIFICANT CHANGE UP (ref 150–400)
POTASSIUM SERPL-MCNC: 3.7 MMOL/L — SIGNIFICANT CHANGE UP (ref 3.5–5.3)
POTASSIUM SERPL-SCNC: 3.7 MMOL/L — SIGNIFICANT CHANGE UP (ref 3.5–5.3)
PROT SERPL-MCNC: 6.7 G/DL — SIGNIFICANT CHANGE UP (ref 6–8.3)
RBC # BLD: 3.97 M/UL — LOW (ref 4.2–5.8)
RBC # FLD: 14.1 % — SIGNIFICANT CHANGE UP (ref 10.3–14.5)
SODIUM SERPL-SCNC: 137 MMOL/L — SIGNIFICANT CHANGE UP (ref 135–145)
WBC # BLD: 5 K/UL — SIGNIFICANT CHANGE UP (ref 3.8–10.5)
WBC # FLD AUTO: 5 K/UL — SIGNIFICANT CHANGE UP (ref 3.8–10.5)

## 2021-03-05 PROCEDURE — 71045 X-RAY EXAM CHEST 1 VIEW: CPT | Mod: 26

## 2021-03-05 PROCEDURE — 99233 SBSQ HOSP IP/OBS HIGH 50: CPT

## 2021-03-05 RX ORDER — POTASSIUM CHLORIDE 20 MEQ
20 PACKET (EA) ORAL ONCE
Refills: 0 | Status: COMPLETED | OUTPATIENT
Start: 2021-03-05 | End: 2021-03-05

## 2021-03-05 RX ADMIN — Medication 650 MILLIGRAM(S): at 06:02

## 2021-03-05 RX ADMIN — PANTOPRAZOLE SODIUM 40 MILLIGRAM(S): 20 TABLET, DELAYED RELEASE ORAL at 06:03

## 2021-03-05 RX ADMIN — LIDOCAINE 1 PATCH: 4 CREAM TOPICAL at 19:46

## 2021-03-05 RX ADMIN — ATENOLOL 50 MILLIGRAM(S): 25 TABLET ORAL at 18:32

## 2021-03-05 RX ADMIN — HEPARIN SODIUM 5000 UNIT(S): 5000 INJECTION INTRAVENOUS; SUBCUTANEOUS at 21:29

## 2021-03-05 RX ADMIN — Medication 650 MILLIGRAM(S): at 18:33

## 2021-03-05 RX ADMIN — Medication 650 MILLIGRAM(S): at 06:48

## 2021-03-05 RX ADMIN — ATENOLOL 50 MILLIGRAM(S): 25 TABLET ORAL at 06:03

## 2021-03-05 RX ADMIN — Medication 650 MILLIGRAM(S): at 13:00

## 2021-03-05 RX ADMIN — Medication 650 MILLIGRAM(S): at 11:59

## 2021-03-05 RX ADMIN — Medication 650 MILLIGRAM(S): at 00:02

## 2021-03-05 RX ADMIN — HEPARIN SODIUM 5000 UNIT(S): 5000 INJECTION INTRAVENOUS; SUBCUTANEOUS at 06:03

## 2021-03-05 RX ADMIN — HEPARIN SODIUM 5000 UNIT(S): 5000 INJECTION INTRAVENOUS; SUBCUTANEOUS at 14:16

## 2021-03-05 RX ADMIN — ATORVASTATIN CALCIUM 10 MILLIGRAM(S): 80 TABLET, FILM COATED ORAL at 21:29

## 2021-03-05 RX ADMIN — TAMSULOSIN HYDROCHLORIDE 0.4 MILLIGRAM(S): 0.4 CAPSULE ORAL at 21:29

## 2021-03-05 RX ADMIN — Medication 650 MILLIGRAM(S): at 19:02

## 2021-03-05 RX ADMIN — Medication 81 MILLIGRAM(S): at 11:58

## 2021-03-05 RX ADMIN — Medication 20 MILLIEQUIVALENT(S): at 06:02

## 2021-03-05 RX ADMIN — LIDOCAINE 1 PATCH: 4 CREAM TOPICAL at 11:59

## 2021-03-05 NOTE — PROGRESS NOTE ADULT - SUBJECTIVE AND OBJECTIVE BOX
PROCEDURE: Robotic RVATS, large exudative right pleural effusion drained, trapped lung, pleural biopsy (frozen-benign), partial decortication, tunneled PleurX catheter insertion  02-Mar-2021       ISSUES:   Pleural effusion  Lung cancer s/p LULobectomy (8/2020)  Post op pain  Chest tube in place  BPH  HLD  HTN  Dementia  Hx of tuberculosis (unclear history)  Glaucoma  Afib on apixaban.   s/p Pacemaker [employer], model [id number], serial [id number], date 7/1/2014      INTERVAL EVENTS:   No airleak. Chest tubes placed on waterseal.      HISTORY:   Patient reports moderate pain at chest wall incision sites which is worse with coughing and deep breathing without associated fever or dyspnea. Pain is improved with use of pain meds.     PHYSICAL EXAM:   Gen: Comfortable, No acute distress  Eyes: Sclera white, Conjunctiva normal, Eyelids normal, Pupils symmetrical   ENT: Mucous membranes moist,  ,  ,    Neck: Trachea midline,  ,  ,  ,  ,    CV: Rate regular, Rhythm regular,  ,  ,    Resp: Breath sounds clear, No accessory muscles use, Two chest tubes,    Abd: Soft, Non-distended, Non-tender, Bowel sounds normal, feeding tube in place,  ,    Skin: Warm, No peripheral edema of lower extremities,  ,    : No merchant  Neuro: Moving all 4 extremities,    Psych: A&Ox3      ASSESSMENT AND PLAN:     NEURO:  Post-operative Pain - Pain control with PCA and Tylenol IV PRN.          RESPIRATORY:  Hypoxia - Wean nasal cannula for goal O2sat above 92. Obtain CXR. Incentive spirometry. Chest PT and frequent suctioning. Continue bronchodilators. OOB to chair & ambulate w/ assistance. Continuous pulse oximetry for support & to prevent decompensation.       Chest tube – Pleurevac regulated waterseal. Monitor chest tube output.            CARDIOVASCULAR:  Hemodynamically stable - Not on pressors. Continue hemodynamic monitoring.  HTN - stable. Continue home antihypertensives medications.     Paroxysmal Afib - stable. continue rhythm and rate control meds. Hold anticoagulation          RENAL:  Stable – LR IVF 30mL/hr. Monitor IOs and electrolytes.   BPH - Stable. Flomax qday      GASTROINTESTINAL:  GI prophylaxis not indicated  Zofran and Reglan IV PRN for nausea  Regular consistency diet          HEMATOLOGIC:  No signs of active bleeding. Monitor Hgb in CBC in AM  DVT prophylaxis with heparin subQ and SCDs.         INFECTIOUS DISEASE:  All surgical sites appear clean. Will monitor for fever and leukocytosis.         ENDOCRINE:  Stable – Monitor glucose fingersticks for goal 120-180.            Pertinent clinical, laboratory, radiographic, hemodynamic, echocardiographic, respiratory data, microbiologic data and chart were reviewed by myself and analyzed frequently throughout the course of the day and night by myself.    Plan discussed at length with the CTICU staff and Attending CT Surgeon.     Patient's status was discussed with patient at bedside.    _________________________  VITAL SIGNS:  Vital Signs Last 24 Hrs  T(C): 36.5 (05 Mar 2021 12:00), Max: 36.9 (04 Mar 2021 20:00)  T(F): 97.7 (05 Mar 2021 12:00), Max: 98.4 (04 Mar 2021 20:00)  HR: 64 (05 Mar 2021 14:00) (60 - 79)  BP: 102/53 (05 Mar 2021 14:00) (102/53 - 148/79)  BP(mean): 68 (05 Mar 2021 14:00) (68 - 101)  RR: 19 (05 Mar 2021 14:00) (16 - 23)  SpO2: 96% (05 Mar 2021 14:00) (92% - 98%)  I/Os:   I&O's Detail    04 Mar 2021 07:01  -  05 Mar 2021 07:00  --------------------------------------------------------  IN:    Lactated Ringers: 660 mL    Oral Fluid: 880 mL  Total IN: 1540 mL    OUT:    Chest Tube (mL): 90 mL    Chest Tube (mL): 200 mL    Voided (mL): 800 mL  Total OUT: 1090 mL    Total NET: 450 mL      05 Mar 2021 07:01  -  05 Mar 2021 14:09  --------------------------------------------------------  IN:    Lactated Ringers: 150 mL    Oral Fluid: 480 mL  Total IN: 630 mL    OUT:    Chest Tube (mL): 30 mL    Chest Tube (mL): 10 mL    Voided (mL): 200 mL  Total OUT: 240 mL    Total NET: 390 mL              MEDICATIONS:  MEDICATIONS  (STANDING):  acetaminophen   Tablet .. 650 milliGRAM(s) Oral every 6 hours  aspirin  chewable 81 milliGRAM(s) Oral daily  ATENolol  Tablet 50 milliGRAM(s) Oral every 12 hours  atorvastatin 10 milliGRAM(s) Oral at bedtime  heparin   Injectable 5000 Unit(s) SubCutaneous every 8 hours  lactated ringers. 1000 milliLiter(s) (30 mL/Hr) IV Continuous <Continuous>  lidocaine   Patch 1 Patch Transdermal every 24 hours  pantoprazole    Tablet 40 milliGRAM(s) Oral before breakfast  tamsulosin 0.4 milliGRAM(s) Oral at bedtime    MEDICATIONS  (PRN):  HYDROmorphone  Injectable 0.3 milliGRAM(s) IV Push every 3 hours PRN Severe breakthrough Pain (7 - 10)  naloxone Injectable 0.1 milliGRAM(s) IV Push every 3 minutes PRN For ANY of the following changes in patient status:  A. RR LESS THAN 10 breaths per minute, B. Oxygen saturation LESS THAN 90%, C. Sedation score of 6  ondansetron Injectable 4 milliGRAM(s) IV Push every 6 hours PRN Nausea  oxyCODONE    IR 5 milliGRAM(s) Oral every 3 hours PRN Moderate to Severe Pain (4 - 10)      LABS:                        12.1   5.00  )-----------( 160      ( 05 Mar 2021 04:31 )             37.1     03-05    137  |  101  |  14  ----------------------------<  106<H>  3.7   |  26  |  0.65    Ca    8.5      05 Mar 2021 04:31  Phos  3.4     03-05  Mg     2.0     03-05    TPro  6.7  /  Alb  3.2<L>  /  TBili  1.1  /  DBili  x   /  AST  24  /  ALT  25  /  AlkPhos  78  03-05    LIVER FUNCTIONS - ( 05 Mar 2021 04:31 )  Alb: 3.2 g/dL / Pro: 6.7 g/dL / ALK PHOS: 78 U/L / ALT: 25 U/L / AST: 24 U/L / GGT: x           PT/INR - ( 04 Mar 2021 06:18 )   PT: 12.7 sec;   INR: 1.12 ratio         PTT - ( 04 Mar 2021 06:18 )  PTT:25.4 sec      _________________________

## 2021-03-06 LAB
ANION GAP SERPL CALC-SCNC: 11 MMOL/L — SIGNIFICANT CHANGE UP (ref 7–14)
APTT BLD: 32.6 SEC — SIGNIFICANT CHANGE UP (ref 27–36.3)
BUN SERPL-MCNC: 11 MG/DL — SIGNIFICANT CHANGE UP (ref 7–23)
CALCIUM SERPL-MCNC: 8.8 MG/DL — SIGNIFICANT CHANGE UP (ref 8.4–10.5)
CHLORIDE SERPL-SCNC: 100 MMOL/L — SIGNIFICANT CHANGE UP (ref 98–107)
CO2 SERPL-SCNC: 25 MMOL/L — SIGNIFICANT CHANGE UP (ref 22–31)
CREAT SERPL-MCNC: 0.65 MG/DL — SIGNIFICANT CHANGE UP (ref 0.5–1.3)
GLUCOSE SERPL-MCNC: 98 MG/DL — SIGNIFICANT CHANGE UP (ref 70–99)
HCT VFR BLD CALC: 40.1 % — SIGNIFICANT CHANGE UP (ref 39–50)
HGB BLD-MCNC: 12.7 G/DL — LOW (ref 13–17)
INR BLD: 1.09 RATIO — SIGNIFICANT CHANGE UP (ref 0.88–1.16)
MAGNESIUM SERPL-MCNC: 2.1 MG/DL — SIGNIFICANT CHANGE UP (ref 1.6–2.6)
MCHC RBC-ENTMCNC: 30.4 PG — SIGNIFICANT CHANGE UP (ref 27–34)
MCHC RBC-ENTMCNC: 31.7 GM/DL — LOW (ref 32–36)
MCV RBC AUTO: 95.9 FL — SIGNIFICANT CHANGE UP (ref 80–100)
NRBC # BLD: 0 /100 WBCS — SIGNIFICANT CHANGE UP
NRBC # FLD: 0 K/UL — SIGNIFICANT CHANGE UP
PHOSPHATE SERPL-MCNC: 2.7 MG/DL — SIGNIFICANT CHANGE UP (ref 2.5–4.5)
PLATELET # BLD AUTO: 188 K/UL — SIGNIFICANT CHANGE UP (ref 150–400)
POTASSIUM SERPL-MCNC: 4 MMOL/L — SIGNIFICANT CHANGE UP (ref 3.5–5.3)
POTASSIUM SERPL-SCNC: 4 MMOL/L — SIGNIFICANT CHANGE UP (ref 3.5–5.3)
PROTHROM AB SERPL-ACNC: 12.4 SEC — SIGNIFICANT CHANGE UP (ref 10.6–13.6)
RBC # BLD: 4.18 M/UL — LOW (ref 4.2–5.8)
RBC # FLD: 13.9 % — SIGNIFICANT CHANGE UP (ref 10.3–14.5)
SODIUM SERPL-SCNC: 136 MMOL/L — SIGNIFICANT CHANGE UP (ref 135–145)
WBC # BLD: 4.56 K/UL — SIGNIFICANT CHANGE UP (ref 3.8–10.5)
WBC # FLD AUTO: 4.56 K/UL — SIGNIFICANT CHANGE UP (ref 3.8–10.5)

## 2021-03-06 PROCEDURE — 71045 X-RAY EXAM CHEST 1 VIEW: CPT | Mod: 26

## 2021-03-06 RX ORDER — POLYETHYLENE GLYCOL 3350 17 G/17G
17 POWDER, FOR SOLUTION ORAL DAILY
Refills: 0 | Status: DISCONTINUED | OUTPATIENT
Start: 2021-03-06 | End: 2021-03-08

## 2021-03-06 RX ORDER — SENNA PLUS 8.6 MG/1
2 TABLET ORAL AT BEDTIME
Refills: 0 | Status: DISCONTINUED | OUTPATIENT
Start: 2021-03-06 | End: 2021-03-08

## 2021-03-06 RX ADMIN — ATENOLOL 50 MILLIGRAM(S): 25 TABLET ORAL at 18:08

## 2021-03-06 RX ADMIN — TAMSULOSIN HYDROCHLORIDE 0.4 MILLIGRAM(S): 0.4 CAPSULE ORAL at 22:02

## 2021-03-06 RX ADMIN — Medication 650 MILLIGRAM(S): at 01:00

## 2021-03-06 RX ADMIN — LIDOCAINE 1 PATCH: 4 CREAM TOPICAL at 19:38

## 2021-03-06 RX ADMIN — HEPARIN SODIUM 5000 UNIT(S): 5000 INJECTION INTRAVENOUS; SUBCUTANEOUS at 14:58

## 2021-03-06 RX ADMIN — HEPARIN SODIUM 5000 UNIT(S): 5000 INJECTION INTRAVENOUS; SUBCUTANEOUS at 22:02

## 2021-03-06 RX ADMIN — Medication 650 MILLIGRAM(S): at 00:17

## 2021-03-06 RX ADMIN — Medication 81 MILLIGRAM(S): at 12:35

## 2021-03-06 RX ADMIN — HEPARIN SODIUM 5000 UNIT(S): 5000 INJECTION INTRAVENOUS; SUBCUTANEOUS at 05:01

## 2021-03-06 RX ADMIN — LIDOCAINE 1 PATCH: 4 CREAM TOPICAL at 12:35

## 2021-03-06 RX ADMIN — Medication 650 MILLIGRAM(S): at 05:01

## 2021-03-06 RX ADMIN — PANTOPRAZOLE SODIUM 40 MILLIGRAM(S): 20 TABLET, DELAYED RELEASE ORAL at 05:02

## 2021-03-06 RX ADMIN — ATORVASTATIN CALCIUM 10 MILLIGRAM(S): 80 TABLET, FILM COATED ORAL at 22:02

## 2021-03-06 RX ADMIN — Medication 650 MILLIGRAM(S): at 06:00

## 2021-03-06 RX ADMIN — ATENOLOL 50 MILLIGRAM(S): 25 TABLET ORAL at 05:01

## 2021-03-06 RX ADMIN — SENNA PLUS 2 TABLET(S): 8.6 TABLET ORAL at 22:02

## 2021-03-06 RX ADMIN — LIDOCAINE 1 PATCH: 4 CREAM TOPICAL at 00:17

## 2021-03-06 NOTE — CHART NOTE - NSCHARTNOTEFT_GEN_A_CORE
Called by RN that pt had two BM's today and the second was brownish green rimmed with a small amount of blood. Pt has not had a BM since before the OR fours days ago. Used  phone to speak with pt. He had hemorrhoids years ago and underwent hemorrhoidectomy at that time. I suspect he has hemorrhoids again exacerbated by constipation. HCT and vitals stable. Will get cbc in am, and order bowel regimen Called by RN that pt had two BM's today and the second was brownish green rimmed with a small amount of blood. Pt has not had a BM since before the OR fours days ago. Used  phone to speak with pt. He had hemorrhoids years ago and underwent hemorrhoidectomy at that time. I suspect he has hemorrhoids again exacerbated by constipation. HCT, coags and vitals stable. Will get cbc in am, and order bowel regimen

## 2021-03-06 NOTE — PROGRESS NOTE ADULT - ASSESSMENT
POD#4 s/p Robotic RVATS, drainage of effusion, pleural biopsy, partial decort and pleurx cath placement. Yesterday his tubes were placed to waterseal w/ increase in right apical PTX. Both tube now on sx, will cont sx for today and try waterseal again tomorrow. am cxr and labs pending.  Neuro: Pain management  Pulm: Encourage coughing, deep breathing and use of incentive spirometry. Wean off supplemental oxygen as able. Daily CXR.   Cardio: Monitor telemetry/alarms  GI: Tolerating diet. Continue stool softeners.  Renal: monitor urine output, supplement electrolytes as needed  Vasc: Heparin SC/SCDs for DVT prophylaxis  Heme: Stable H/H. .   ID:  no antibiotics. Stable.  Therapy: OOB/ambulate  Tubes: Monitor Chest tube output, cont suction  Disposition: f/u cxr  Discussed with Cardiothoracic Team at AM rounds.     POD#4 s/p Robotic RVATS, drainage of effusion, pleural biopsy, partial decort and pleurx cath placement. Yesterday his tubes were placed to waterseal w/ increase in right apical PTX. Both tube now on sx, will cont sx for today and try waterseal again tomorrow. am cxr and labs pending.  Neuro: Pain management  Pulm: Encourage coughing, deep breathing and use of incentive spirometry. Wean off supplemental oxygen as able. Daily CXR.   Cardio: Monitor telemetry/alarms  GI: Tolerating diet. Continue stool softeners.  Renal: monitor urine output, supplement electrolytes as needed  Vasc: Heparin SC/SCDs for DVT prophylaxis  Heme: Stable H/H. .   ID:  no antibiotics. Stable.  Therapy: OOB/ambulate  Tubes: Monitor Chest tube output, cont suction  Disposition: f/u cxr  Discussed with Cardiothoracic Team at AM rounds.

## 2021-03-07 LAB
CULTURE RESULTS: NO GROWTH — SIGNIFICANT CHANGE UP
HCT VFR BLD CALC: 36.9 % — LOW (ref 39–50)
HGB BLD-MCNC: 11.8 G/DL — LOW (ref 13–17)
MCHC RBC-ENTMCNC: 29.7 PG — SIGNIFICANT CHANGE UP (ref 27–34)
MCHC RBC-ENTMCNC: 32 GM/DL — SIGNIFICANT CHANGE UP (ref 32–36)
MCV RBC AUTO: 92.9 FL — SIGNIFICANT CHANGE UP (ref 80–100)
NRBC # BLD: 0 /100 WBCS — SIGNIFICANT CHANGE UP
NRBC # FLD: 0 K/UL — SIGNIFICANT CHANGE UP
PLATELET # BLD AUTO: 176 K/UL — SIGNIFICANT CHANGE UP (ref 150–400)
RBC # BLD: 3.97 M/UL — LOW (ref 4.2–5.8)
RBC # FLD: 14 % — SIGNIFICANT CHANGE UP (ref 10.3–14.5)
SPECIMEN SOURCE: SIGNIFICANT CHANGE UP
WBC # BLD: 6.11 K/UL — SIGNIFICANT CHANGE UP (ref 3.8–10.5)
WBC # FLD AUTO: 6.11 K/UL — SIGNIFICANT CHANGE UP (ref 3.8–10.5)

## 2021-03-07 PROCEDURE — 71045 X-RAY EXAM CHEST 1 VIEW: CPT | Mod: 26

## 2021-03-07 RX ORDER — IPRATROPIUM/ALBUTEROL SULFATE 18-103MCG
3 AEROSOL WITH ADAPTER (GRAM) INHALATION EVERY 6 HOURS
Refills: 0 | Status: DISCONTINUED | OUTPATIENT
Start: 2021-03-07 | End: 2021-03-08

## 2021-03-07 RX ADMIN — SENNA PLUS 2 TABLET(S): 8.6 TABLET ORAL at 21:34

## 2021-03-07 RX ADMIN — ATORVASTATIN CALCIUM 10 MILLIGRAM(S): 80 TABLET, FILM COATED ORAL at 21:35

## 2021-03-07 RX ADMIN — Medication 81 MILLIGRAM(S): at 12:16

## 2021-03-07 RX ADMIN — ATENOLOL 50 MILLIGRAM(S): 25 TABLET ORAL at 17:40

## 2021-03-07 RX ADMIN — HEPARIN SODIUM 5000 UNIT(S): 5000 INJECTION INTRAVENOUS; SUBCUTANEOUS at 21:35

## 2021-03-07 RX ADMIN — HEPARIN SODIUM 5000 UNIT(S): 5000 INJECTION INTRAVENOUS; SUBCUTANEOUS at 12:19

## 2021-03-07 RX ADMIN — LIDOCAINE 1 PATCH: 4 CREAM TOPICAL at 19:45

## 2021-03-07 RX ADMIN — ATENOLOL 50 MILLIGRAM(S): 25 TABLET ORAL at 06:11

## 2021-03-07 RX ADMIN — TAMSULOSIN HYDROCHLORIDE 0.4 MILLIGRAM(S): 0.4 CAPSULE ORAL at 21:35

## 2021-03-07 RX ADMIN — Medication 3 MILLILITER(S): at 23:54

## 2021-03-07 RX ADMIN — LIDOCAINE 1 PATCH: 4 CREAM TOPICAL at 12:16

## 2021-03-07 RX ADMIN — PANTOPRAZOLE SODIUM 40 MILLIGRAM(S): 20 TABLET, DELAYED RELEASE ORAL at 06:11

## 2021-03-07 RX ADMIN — Medication 3 MILLILITER(S): at 15:58

## 2021-03-07 RX ADMIN — LIDOCAINE 1 PATCH: 4 CREAM TOPICAL at 00:37

## 2021-03-07 RX ADMIN — HEPARIN SODIUM 5000 UNIT(S): 5000 INJECTION INTRAVENOUS; SUBCUTANEOUS at 06:11

## 2021-03-07 NOTE — PROGRESS NOTE ADULT - REASON FOR ADMISSION
Scheduled Right VATS tomorrow
Right VATS
Scheduled Right VATS tomorrow

## 2021-03-07 NOTE — PROGRESS NOTE ADULT - PROVIDER SPECIALTY LIST ADULT
Anesthesia
Pain Medicine
Anesthesia
Critical Care
Pain Medicine
CT Surgery
Critical Care
Pain Medicine
Thoracic Surgery

## 2021-03-07 NOTE — PROGRESS NOTE ADULT - SUBJECTIVE AND OBJECTIVE BOX
Subjective: "I hope I can go home tomorrow" Pt states productive cough, worried about the amount of "water" draining from his chest. States some SOB w coughing episodes. No CP. Minimal pain at tube sites. Amb w assist to chair.     Vital Signs:  Vital Signs Last 24 Hrs  T(C): 36.6 (03-07-21 @ 08:40), Max: 37 (03-07-21 @ 00:34)  T(F): 97.8 (03-07-21 @ 08:40), Max: 98.6 (03-07-21 @ 00:34)  HR: 64 (03-07-21 @ 08:40) (61 - 71)  BP: 127/66 (03-07-21 @ 08:40) (121/64 - 153/77)  RR: 18 (03-07-21 @ 08:40) (18 - 18)  SpO2: 93% (03-07-21 @ 08:40) (93% - 98%) on (O2)    Telemetry/Alarms:  General: WN/WD NAD  Neurology: Awake, nonfocal, IRVIN x 4  Eyes: Scleras clear, PERRLA/ EOMI, Gross vision intact  ENT:Gross hearing intact, grossly patent pharynx, no stridor  Neck: Neck supple, trachea midline, No JVD,   Respiratory: Expir wheeze b/l w rhonchi. Prod cough.   CV: RRR, S1S2, no murmurs, rubs or gallops  Abdominal: Soft, NT, ND +BS, +BM yesterday. No BRBPR noted. Voiding well.   Extremities: No edema, + peripheral pulses  Skin: No Rashes, Hematoma, Ecchymosis  Lymphatic: No Neck, axilla, groin LAD  Psych: Oriented x 3, normal affect  Incisions: Rt. VATS c/d/i. +SQ air stable to rt. lateral chest.   Tubes: Rt. CT- 170cc/24hrs on sxn, no AL. Rt. Pleurx- 70cc/24hrs on sxn, no AL  Relevant labs, radiology and Medications reviewed                        11.8   6.11  )-----------( 176      ( 07 Mar 2021 06:33 )             36.9     03-06    136  |  100  |  11  ----------------------------<  98  4.0   |  25  |  0.65    Ca    8.8      06 Mar 2021 07:36  Phos  2.7     03-06  Mg     2.1     03-06      PT/INR - ( 06 Mar 2021 07:36 )   PT: 12.4 sec;   INR: 1.09 ratio         PTT - ( 06 Mar 2021 07:36 )  PTT:32.6 sec  MEDICATIONS  (STANDING):  albuterol/ipratropium for Nebulization 3 milliLiter(s) Nebulizer every 6 hours  aspirin  chewable 81 milliGRAM(s) Oral daily  ATENolol  Tablet 50 milliGRAM(s) Oral every 12 hours  atorvastatin 10 milliGRAM(s) Oral at bedtime  heparin   Injectable 5000 Unit(s) SubCutaneous every 8 hours  lidocaine   Patch 1 Patch Transdermal every 24 hours  pantoprazole    Tablet 40 milliGRAM(s) Oral before breakfast  polyethylene glycol 3350 17 Gram(s) Oral daily  senna 2 Tablet(s) Oral at bedtime  tamsulosin 0.4 milliGRAM(s) Oral at bedtime    MEDICATIONS  (PRN):  HYDROmorphone  Injectable 0.3 milliGRAM(s) IV Push every 3 hours PRN Severe breakthrough Pain (7 - 10)  naloxone Injectable 0.1 milliGRAM(s) IV Push every 3 minutes PRN For ANY of the following changes in patient status:  A. RR LESS THAN 10 breaths per minute, B. Oxygen saturation LESS THAN 90%, C. Sedation score of 6  ondansetron Injectable 4 milliGRAM(s) IV Push every 6 hours PRN Nausea  oxyCODONE    IR 5 milliGRAM(s) Oral every 3 hours PRN Moderate to Severe Pain (4 - 10)    Pertinent Physical Exam  I&O's Summary    06 Mar 2021 07:01  -  07 Mar 2021 07:00  --------------------------------------------------------  IN: 0 mL / OUT: 1580 mL / NET: -1580 mL      Marital Status:  (   )    (   ) Single    (   )    (  )   Lives with: (  ) alone  (  ) children   (  ) spouse   (  ) parents  (  ) other  Recent Travel: No recent travel  Occupation:    Substance Use (street drugs): ( x ) never used  (  ) other:  Tobacco Usage:  ( x  ) never smoked   (   ) former smoker   (   ) current smoker  (     ) pack year  Alcohol Usage: None     Assessment  86y Male  w/ PAST MEDICAL & SURGICAL HISTORY:  Glaucoma    Tuberculosis    Dementia    Arthritis    History of BPH    Malignant neoplasm of unspecified part of left bronchus or lung    Hyperlipidemia    Hypertension    S/P lobectomy of lung    History of lung biopsy    Pacemaker  [employer]  model [id number]  serial [id number]  date 7/1/2014    S/P total knee replacement, left    admitted with complaints of Patient is a 86y old  Male who presents with a chief complaint of Right VATS (06 Mar 2021 07:37)  .  On (Date), patient underwent Insertion, PleurX catheter system, pleural    Pleural biopsy    Decortication, lung, partial    . Postoperative course/issues:    PLAN  Neuro: Pain management  Pulm: Encourage coughing, deep breathing and use of incentive spirometry. Wean off supplemental oxygen as able. Daily CXR.   Cardio: Monitor telemetry/alarms  GI: Tolerating diet. Continue stool softeners.  Renal: monitor urine output, supplement electrolytes as needed  Vasc: Heparin SC/SCDs for DVT prophylaxis  Heme: Stable H/H. .   ID: Off antibiotics. Stable.  Therapy: OOB/ambulate  Tubes: Monitor Chest tube output  Disposition: Aim to D/C to home on  Discussed with Cardiothoracic Team at AM rounds.      Subjective: "I hope I can go home tomorrow" Pt states productive cough, worried about the amount of "water" draining from his chest. States some SOB w coughing episodes. No CP. Minimal pain at tube sites. Amb w assist to chair.     Vital Signs:  Vital Signs Last 24 Hrs  T(C): 36.6 (03-07-21 @ 08:40), Max: 37 (03-07-21 @ 00:34)  T(F): 97.8 (03-07-21 @ 08:40), Max: 98.6 (03-07-21 @ 00:34)  HR: 64 (03-07-21 @ 08:40) (61 - 71)  BP: 127/66 (03-07-21 @ 08:40) (121/64 - 153/77)  RR: 18 (03-07-21 @ 08:40) (18 - 18)  SpO2: 93% (03-07-21 @ 08:40) (93% - 98%) on (O2)    Telemetry/Alarms:  General: WN/WD NAD  Neurology: Awake, nonfocal, IRVIN x 4  Eyes: Scleras clear, PERRLA/ EOMI, Gross vision intact  ENT:Gross hearing intact, grossly patent pharynx, no stridor  Neck: Neck supple, trachea midline, No JVD,   Respiratory: Expir wheeze b/l w rhonchi. Prod cough.   CV: RRR, S1S2, no murmurs, rubs or gallops  Abdominal: Soft, NT, ND +BS, +BM yesterday. No BRBPR noted. Voiding well.   Extremities: No edema, + peripheral pulses  Skin: No Rashes, Hematoma, Ecchymosis  Lymphatic: No Neck, axilla, groin LAD  Psych: Oriented x 3, normal affect  Incisions: Rt. VATS c/d/i. +SQ air stable to rt. lateral chest.   Tubes: Rt. CT- 170cc/24hrs on sxn, no AL. Rt. Pleurx- 70cc/24hrs on sxn, no AL  Relevant labs, radiology and Medications reviewed      CXR with stable SQ air, no obvious ptx or stable ptx.              11.8   6.11  )-----------( 176      ( 07 Mar 2021 06:33 )             36.9     03-06    136  |  100  |  11  ----------------------------<  98  4.0   |  25  |  0.65    Ca    8.8      06 Mar 2021 07:36  Phos  2.7     03-06  Mg     2.1     03-06      PT/INR - ( 06 Mar 2021 07:36 )   PT: 12.4 sec;   INR: 1.09 ratio         PTT - ( 06 Mar 2021 07:36 )  PTT:32.6 sec  MEDICATIONS  (STANDING):  albuterol/ipratropium for Nebulization 3 milliLiter(s) Nebulizer every 6 hours  aspirin  chewable 81 milliGRAM(s) Oral daily  ATENolol  Tablet 50 milliGRAM(s) Oral every 12 hours  atorvastatin 10 milliGRAM(s) Oral at bedtime  heparin   Injectable 5000 Unit(s) SubCutaneous every 8 hours  lidocaine   Patch 1 Patch Transdermal every 24 hours  pantoprazole    Tablet 40 milliGRAM(s) Oral before breakfast  polyethylene glycol 3350 17 Gram(s) Oral daily  senna 2 Tablet(s) Oral at bedtime  tamsulosin 0.4 milliGRAM(s) Oral at bedtime    MEDICATIONS  (PRN):  HYDROmorphone  Injectable 0.3 milliGRAM(s) IV Push every 3 hours PRN Severe breakthrough Pain (7 - 10)  naloxone Injectable 0.1 milliGRAM(s) IV Push every 3 minutes PRN For ANY of the following changes in patient status:  A. RR LESS THAN 10 breaths per minute, B. Oxygen saturation LESS THAN 90%, C. Sedation score of 6  ondansetron Injectable 4 milliGRAM(s) IV Push every 6 hours PRN Nausea  oxyCODONE    IR 5 milliGRAM(s) Oral every 3 hours PRN Moderate to Severe Pain (4 - 10)    Pertinent Physical Exam  I&O's Summary    06 Mar 2021 07:01  -  07 Mar 2021 07:00  --------------------------------------------------------  IN: 0 mL / OUT: 1580 mL / NET: -1580 mL      Assessment  86y Male  w/ PAST MEDICAL & SURGICAL HISTORY:  Glaucoma    Tuberculosis    Dementia    Arthritis    History of BPH    Malignant neoplasm of unspecified part of left bronchus or lung    Hyperlipidemia    Hypertension    S/P lobectomy of lung    History of lung biopsy    Pacemaker  [employer]  model [id number]  serial [id number]  date 7/1/2014    S/P total knee replacement, left  POD#5 s/p Robotic RVATS, drainage of effusion, pleural biopsy, partial decort and pleurx cath placement. Yesterday his tubes were placed to waterseal w/ increase in right apical PTX. Both tube now on sx, will cont sx for today and try waterseal again tomorrow. am cxr and labs pending.  3/7-Today will trial waterseal and cap Pleurx. Rpt CXR this afternoon    PLAN  Neuro: Pain management  Pulm: Encourage coughing, deep breathing and use of incentive spirometry. Wean off supplemental oxygen as able. Daily CXR. Will add nebs for wheezing  Cardio: Monitor telemetry/alarms. Cont cardiac meds. Continue to hold Eliquis until CT removed.   GI: Tolerating diet. Continue stool softeners. No signs of rectal or GI bleed, HCT stable.   Renal: monitor urine output, supplement electrolytes as needed  Vasc: Heparin SC/SCDs for DVT prophylaxis  Heme: Stable H/H. .   ID: Off antibiotics. Stable.  Therapy: OOB/ambulate  Tubes: Monitor Chest tube output and pleurx output. To waterseal today, will rpt CXR this afternoon.   Disposition: Aim to D/C to home once CT removed and CXR stable.   Discussed with Cardiothoracic Team at AM rounds.

## 2021-03-08 VITALS
HEART RATE: 68 BPM | DIASTOLIC BLOOD PRESSURE: 70 MMHG | RESPIRATION RATE: 18 BRPM | SYSTOLIC BLOOD PRESSURE: 157 MMHG | OXYGEN SATURATION: 95 % | TEMPERATURE: 98 F

## 2021-03-08 LAB — SURGICAL PATHOLOGY STUDY: SIGNIFICANT CHANGE UP

## 2021-03-08 PROCEDURE — 71045 X-RAY EXAM CHEST 1 VIEW: CPT | Mod: 26,76

## 2021-03-08 RX ORDER — OXYCODONE HYDROCHLORIDE 5 MG/1
1 TABLET ORAL
Qty: 25 | Refills: 0
Start: 2021-03-08 | End: 2021-03-12

## 2021-03-08 RX ORDER — SENNA PLUS 8.6 MG/1
2 TABLET ORAL
Qty: 0 | Refills: 0 | DISCHARGE
Start: 2021-03-08

## 2021-03-08 RX ORDER — POLYETHYLENE GLYCOL 3350 17 G/17G
17 POWDER, FOR SOLUTION ORAL
Qty: 0 | Refills: 0 | DISCHARGE
Start: 2021-03-08

## 2021-03-08 RX ORDER — ACETAMINOPHEN 500 MG
2 TABLET ORAL
Qty: 0 | Refills: 0 | DISCHARGE

## 2021-03-08 RX ADMIN — LIDOCAINE 1 PATCH: 4 CREAM TOPICAL at 00:10

## 2021-03-08 RX ADMIN — HEPARIN SODIUM 5000 UNIT(S): 5000 INJECTION INTRAVENOUS; SUBCUTANEOUS at 05:06

## 2021-03-08 RX ADMIN — ATENOLOL 50 MILLIGRAM(S): 25 TABLET ORAL at 05:06

## 2021-03-08 RX ADMIN — Medication 3 MILLILITER(S): at 04:33

## 2021-03-08 RX ADMIN — PANTOPRAZOLE SODIUM 40 MILLIGRAM(S): 20 TABLET, DELAYED RELEASE ORAL at 05:06

## 2021-03-08 RX ADMIN — Medication 3 MILLILITER(S): at 08:26

## 2021-03-08 NOTE — PHYSICAL THERAPY INITIAL EVALUATION ADULT - LIVES WITH, PROFILE
Lives in on the first floor of 2 family house with family. Lives on the first floor of 2 family house with family.

## 2021-03-08 NOTE — PHYSICAL THERAPY INITIAL EVALUATION ADULT - PERTINENT HX OF CURRENT PROBLEM, REHAB EVAL
Pt. s/p Robotic right VATS, drainage of effusion, pleural biopsy, partial decort and pleurx cath placement POD#6.

## 2021-03-08 NOTE — PHYSICAL THERAPY INITIAL EVALUATION ADULT - GENERAL OBSERVATIONS, REHAB EVAL
Consult received, chart reviewed. Patient received seated in recliner chair, no apparent distress, +cardiac monitor, +right chest tube.

## 2021-03-08 NOTE — PHYSICAL THERAPY INITIAL EVALUATION ADULT - ADDITIONAL COMMENTS
Pt. owns a straight cane. Pt. has HHA 7 days/week.     Pt. was left in restroom post PT Evaluation, no apparent distress. Amor PINO made aware of pt. status and participation in PT.

## 2021-03-23 PROBLEM — J90 PLEURAL EFFUSION ON RIGHT: Status: ACTIVE | Noted: 2021-02-26

## 2021-03-25 ENCOUNTER — OUTPATIENT (OUTPATIENT)
Dept: OUTPATIENT SERVICES | Facility: HOSPITAL | Age: 86
LOS: 1 days | End: 2021-03-25
Payer: MEDICARE

## 2021-03-25 ENCOUNTER — APPOINTMENT (OUTPATIENT)
Dept: RADIOLOGY | Facility: HOSPITAL | Age: 86
End: 2021-03-25

## 2021-03-25 ENCOUNTER — RESULT REVIEW (OUTPATIENT)
Age: 86
End: 2021-03-25

## 2021-03-25 ENCOUNTER — APPOINTMENT (OUTPATIENT)
Dept: THORACIC SURGERY | Facility: CLINIC | Age: 86
End: 2021-03-25
Payer: MEDICARE

## 2021-03-25 VITALS
BODY MASS INDEX: 20.92 KG/M2 | SYSTOLIC BLOOD PRESSURE: 149 MMHG | HEIGHT: 68 IN | OXYGEN SATURATION: 95 % | TEMPERATURE: 98.1 F | HEART RATE: 87 BPM | DIASTOLIC BLOOD PRESSURE: 71 MMHG | WEIGHT: 138 LBS

## 2021-03-25 DIAGNOSIS — Z98.890 OTHER SPECIFIED POSTPROCEDURAL STATES: Chronic | ICD-10-CM

## 2021-03-25 DIAGNOSIS — J90 PLEURAL EFFUSION, NOT ELSEWHERE CLASSIFIED: ICD-10-CM

## 2021-03-25 DIAGNOSIS — Z95.0 PRESENCE OF CARDIAC PACEMAKER: Chronic | ICD-10-CM

## 2021-03-25 DIAGNOSIS — Z96.652 PRESENCE OF LEFT ARTIFICIAL KNEE JOINT: Chronic | ICD-10-CM

## 2021-03-25 DIAGNOSIS — Z90.2 ACQUIRED ABSENCE OF LUNG [PART OF]: Chronic | ICD-10-CM

## 2021-03-25 PROCEDURE — 32552 REMOVE LUNG CATHETER: CPT | Mod: 58

## 2021-03-25 PROCEDURE — 71046 X-RAY EXAM CHEST 2 VIEWS: CPT | Mod: 26

## 2021-03-25 PROCEDURE — 99024 POSTOP FOLLOW-UP VISIT: CPT

## 2021-03-25 RX ORDER — ACETAMINOPHEN 500 MG/1
500 TABLET ORAL
Qty: 90 | Refills: 0 | Status: ACTIVE | COMMUNITY
Start: 2021-03-25 | End: 1900-01-01

## 2021-03-29 NOTE — DATA REVIEWED
[FreeTextEntry1] : PET/CT on 02/26/2021:\par - stable loculated bilateral pleural effusion are seen, right greater than left\par - hazy activity seen at the left lung base in a small loculated effusion measuring up to SUV 4.1\par - a few stable enlarged mediastinal nodes w/o malignant activity on PET attributed to scarred nodes, for example 15 mm in the left retrosternal region (image 47)\par - minimal gallstones and mild fatty infiltration of the liver, new, as well as stable severe prostate gland enlargement at 74 mm.

## 2021-03-29 NOTE — HISTORY OF PRESENT ILLNESS
[FreeTextEntry1] : Mr. DOMENIC HAN, 86 year old male, never smoker, w/ hx of HTN, HLD, Dementia, +T.B. (not treated), abnormal heart rhythm s/p pacemaker ~2010, who presented to PCP c/o productive cough with large amount of thick yellowish sputum x 1 mo in Nov 2019, sent for CXR, then CT scan, he was prescribed Augmentin 875mg BID x 10 days, completed in 01/2020. \par \par PFTs on 03/04/2020: %, FEV1 102%, DLCO 67%.\par \par PET/CT on 03/05/2020:\par - mild activity noted within the medial aspect of the right iliac bone with SUV 2.2 (image 135)\par - asymmetric medial deviation of the right vocal cord, with SUV 2.5\par - 2.8 x 3.5 cm mass in the posterior segment of the MYLES (image 50) with SUV 16.3 in the anterior aspect of the lesion\par - 6 mm LLL nodule is difficult to discern on this study secondary to atelectasis\par - some hypermetabolic activity with SUV of 2.8 is noted in the left hilum. The findings may be inflammatory in nature\par - the ascending aorta is mildly dilated, up to 4.2 cm \par - there is marked prostatic enlargement, with the gland measuring 7.5 cm \par \par Of note, Last seen patient on 02/27/2020, I recommended a CT guided bx of Left apex, PET/CT, and PFTs. Patient was seen by Dr. Jitendra Pang on 03/10/2020 and plan for a bx. Patient called back in April and preferred to postponed his appointment due to COVID-19 crisis. \par \par Patient is now s/p CT guided bx of MYLES nodule on 07/23/2020 by Dr. Jitendra Pang. Path of MYLES nodule core bx revealed positive for malignant cells. Non-small cell carcinoma, favor adenocarcinoma. + TTF1, consistent with a primary pulmonary adenocarcinoma. Foundation One is pending. \par \par PET/CT on 08/01/2020:\par - 45 x 38 mm mass in the posterior left lung apex (image 45) extending to the pleura, attributed to a combination of malignancy and scar with SUV 16.9, w/o significant change\par - a new 5 mm nodule adjacent to the right minor fissure anterolaterally (image 67)\par - a few areas of subsegmental atelectasis and/or scarring in both lungs\par - there are stable areas of pericardial calcification attributed to prior sites of chronic inflammation\par - there a few stable enlarged mediastinal nodes w/o malignant activity on PET, for example 15 mm in the left retrosternal region (image 50)\par - SUV 2.2 activity in the right iliac bone near the SI joint (image 132), w/o significant change from 03/05/2020 and w/o CT abnormality\par - there are minimal gallstones and mild fatty infiltration of the liver, new, as well as stable severe prostate gland enlargement at 74 mm\par - 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 Echevarria.\par S/P Flex Bronch on 9/1/2020 by Dr. Lr. \par \par Referred to Hem/Onc Dr. Lizbeth Carolina. Patient started on Targrisso 80 mg on 9/30/2020. \par \par CT Chest on 2/20/21:\par - large Rt pleural effusion w/ fluid extending into the Rt major fissure w/ Rt basilar compression atelectasis\par - small Lt pleural effusion\par \par PET/CT on 02/26/2021:\par - stable loculated bilateral pleural effusion are seen, right greater than left\par - hazy activity seen at the left lung base in a small loculated effusion measuring up to SUV 4.1\par - a few stable enlarged mediastinal nodes w/o malignant activity on PET attributed to scarred nodes, for example 15 mm in the left retrosternal region (image 47)\par - minimal gallstones and mild fatty infiltration of the liver, new, as well as stable severe prostate gland enlargement at 74 mm. \par \par Patient is 3 weeks s/p Right VATS, Robotic-assisted, drainage of 1000 ml of serous right pleural effusion, pleural bx, partial decortication of the right lung, placement of PleurX catheter on 03/02/2021. Path of pleura bx revealed chronic pleuritis, acute fibrinous pleuritis. 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 Right pleural fluid revealed negative for malignant cells. \par \par Patient called on 3/19/21 stating Rt PleurX stopped draining, instructed patient to stop Eliquis on 3/22/21 and come in to office on 3/25/21 for Rt PleurX removal.\par CXR today -- minimal Rt pleural effusion.\par \par

## 2021-03-29 NOTE — ASSESSMENT
[FreeTextEntry1] : Mr. DOMENIC HAN, 86 year old male, never smoker, w/ hx of HTN, HLD, Dementia, +T.B. (not treated), abnormal heart rhythm s/p pacemaker ~2010, who presented to PCP c/o productive cough with large amount of thick yellowish sputum x 1 mo in Nov 2019, sent for CXR, then CT scan, he was prescribed Augmentin 875mg BID x 10 days, completed in 01/2020. \par \par Patient is now s/p CT guided bx of MYLES nodule on 07/23/2020 by Dr. Jitendra Pang. Path of MYLES nodule core bx revealed positive for malignant cells. Non-small cell carcinoma, favor adenocarcinoma. + TTF1, consistent with a primary pulmonary adenocarcinoma. \par \par Now 7 mo s/p Lt VATS Robotic-assisted, LULobectomy w/ en bloc parietal pleurectomy, MLND, wedge rxn of LLL tear on 8/25/2020. 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 Echevarria.\par S/P Flex Bronch on 9/1/2020 by Dr. Lr. \par \par Referred to Hem/Onc Dr. Lizbeth Carolina. Patient started on Targrisso 80 mg on 9/30/2020. \par \par CT Chest on 2/20/21:\par - large Rt pleural effusion w/ fluid extending into the Rt major fissure w/ Rt basilar compression atelectasis\par - small Lt pleural effusion\par \par PET/CT on 02/26/2021:\par - stable loculated bilateral pleural effusion are seen, right greater than left\par - hazy activity seen at the left lung base in a small loculated effusion measuring up to SUV 4.1\par - a few stable enlarged mediastinal nodes w/o malignant activity on PET attributed to scarred nodes, for example 15 mm in the left retrosternal region (image 47)\par - minimal gallstones and mild fatty infiltration of the liver, new, as well as stable severe prostate gland enlargement at 74 mm. \par \par Patient is 3 weeks s/p Right VATS, Robotic-assisted, drainage of 1000 ml of serous right pleural effusion, pleural bx, partial decortication of the right lung, placement of PleurX catheter on 03/02/2021. Path of pleura bx revealed chronic pleuritis, acute fibrinous pleuritis. 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 Right pleural fluid revealed negative for malignant cells. \par \par \par I have reviewed the patient's medical records and diagnostic images at time of this office consultation and have made the following recommendation:\par 1. Rt PleurX removed by me, assisted by Dr. Ned Jose.\par 2. Post-removal CXR showed no PTX\par 3. Patient is to RTC in 2 wks with CXR.\par 4. Path reviewed and explained to patient and his son, negative for malignancy, patient is to f/u with PCP and Hem/Onc.\par \par \par I personally performed the services described in the documentation, reviewed the documentation recorded by the scribe in my presence and it accurately and completely records my words and actions.\par \par I, Jeremaís Gtz NP, am scribing for and the presence of JOHANNA Proctor, the following sections HISTORY OF PRESENT ILLNESS, PAST MEDICAL/FAMILY/SOCIAL HISTORY; REVIEW OF SYSTEMS; VITAL SIGNS; PHYSICAL EXAM; DISPOSITION.\par

## 2021-03-29 NOTE — CONSULT LETTER
[Dear  ___] : Dear  [unfilled], [Consult Letter:] : I had the pleasure of evaluating your patient, [unfilled]. [( Thank you for referring [unfilled] for consultation for _____ )] : Thank you for referring [unfilled] for consultation for [unfilled] [Please see my note below.] : Please see my note below. [Consult Closing:] : Thank you very much for allowing me to participate in the care of this patient.  If you have any questions, please do not hesitate to contact me. [Sincerely,] : Sincerely, [DrJohn  ___] : Dr. DODD [FreeTextEntry2] : Dr. Duvall (PCP/Ref) \par Lizbeth Carolina MD (Hem/Onc)  [FreeTextEntry3] : Sebas Streeter MD, MPH \par System Director of Thoracic Surgery \par Director of [facility] [place] \par Professor Cardiovascular & Thoracic Surgery \par [facility] [employer] [facility]\par \par

## 2021-03-29 NOTE — REASON FOR VISIT
[Follow-Up: _____] : a [unfilled] follow-up visit [Other: _____] : [unfilled] [FreeTextEntry1] : Lung cancer and pleural effusion

## 2021-03-31 LAB
CULTURE RESULTS: SIGNIFICANT CHANGE UP
SPECIMEN SOURCE: SIGNIFICANT CHANGE UP

## 2021-04-08 ENCOUNTER — APPOINTMENT (OUTPATIENT)
Dept: THORACIC SURGERY | Facility: CLINIC | Age: 86
End: 2021-04-08
Payer: MEDICARE

## 2021-04-08 ENCOUNTER — OUTPATIENT (OUTPATIENT)
Dept: OUTPATIENT SERVICES | Facility: HOSPITAL | Age: 86
LOS: 1 days | End: 2021-04-08
Payer: MEDICARE

## 2021-04-08 ENCOUNTER — RESULT REVIEW (OUTPATIENT)
Age: 86
End: 2021-04-08

## 2021-04-08 ENCOUNTER — APPOINTMENT (OUTPATIENT)
Dept: RADIOLOGY | Facility: HOSPITAL | Age: 86
End: 2021-04-08

## 2021-04-08 VITALS
DIASTOLIC BLOOD PRESSURE: 61 MMHG | WEIGHT: 110.23 LBS | OXYGEN SATURATION: 98 % | HEART RATE: 83 BPM | SYSTOLIC BLOOD PRESSURE: 90 MMHG | HEIGHT: 64.57 IN | TEMPERATURE: 98.4 F | RESPIRATION RATE: 18 BRPM | BODY MASS INDEX: 18.59 KG/M2

## 2021-04-08 VITALS
TEMPERATURE: 98.9 F | HEART RATE: 77 BPM | SYSTOLIC BLOOD PRESSURE: 114 MMHG | BODY MASS INDEX: 19.04 KG/M2 | OXYGEN SATURATION: 95 % | RESPIRATION RATE: 17 BRPM | HEIGHT: 70 IN | WEIGHT: 133 LBS | DIASTOLIC BLOOD PRESSURE: 67 MMHG

## 2021-04-08 DIAGNOSIS — Z98.890 OTHER SPECIFIED POSTPROCEDURAL STATES: Chronic | ICD-10-CM

## 2021-04-08 DIAGNOSIS — C34.90 MALIGNANT NEOPLASM OF UNSPECIFIED PART OF UNSPECIFIED BRONCHUS OR LUNG: ICD-10-CM

## 2021-04-08 DIAGNOSIS — Z90.2 ACQUIRED ABSENCE OF LUNG [PART OF]: Chronic | ICD-10-CM

## 2021-04-08 DIAGNOSIS — Z95.0 PRESENCE OF CARDIAC PACEMAKER: Chronic | ICD-10-CM

## 2021-04-08 DIAGNOSIS — Z96.652 PRESENCE OF LEFT ARTIFICIAL KNEE JOINT: Chronic | ICD-10-CM

## 2021-04-08 PROCEDURE — 71046 X-RAY EXAM CHEST 2 VIEWS: CPT | Mod: 26

## 2021-04-08 PROCEDURE — 99024 POSTOP FOLLOW-UP VISIT: CPT

## 2021-04-21 LAB
CULTURE RESULTS: SIGNIFICANT CHANGE UP
SPECIMEN SOURCE: SIGNIFICANT CHANGE UP

## 2021-07-15 ENCOUNTER — APPOINTMENT (OUTPATIENT)
Dept: THORACIC SURGERY | Facility: CLINIC | Age: 86
End: 2021-07-15
Payer: MEDICARE

## 2021-07-19 PROBLEM — C34.92 ADENOCARCINOMA OF LEFT LUNG: Status: ACTIVE | Noted: 2020-08-05

## 2021-07-22 ENCOUNTER — APPOINTMENT (OUTPATIENT)
Dept: RADIOLOGY | Facility: HOSPITAL | Age: 86
End: 2021-07-22

## 2021-07-22 ENCOUNTER — NON-APPOINTMENT (OUTPATIENT)
Age: 86
End: 2021-07-22

## 2021-07-22 ENCOUNTER — OUTPATIENT (OUTPATIENT)
Dept: OUTPATIENT SERVICES | Facility: HOSPITAL | Age: 86
LOS: 1 days | End: 2021-07-22
Payer: MEDICARE

## 2021-07-22 ENCOUNTER — APPOINTMENT (OUTPATIENT)
Dept: THORACIC SURGERY | Facility: CLINIC | Age: 86
End: 2021-07-22
Payer: MEDICARE

## 2021-07-22 ENCOUNTER — RESULT REVIEW (OUTPATIENT)
Age: 86
End: 2021-07-22

## 2021-07-22 VITALS
OXYGEN SATURATION: 95 % | DIASTOLIC BLOOD PRESSURE: 67 MMHG | HEIGHT: 70 IN | WEIGHT: 138 LBS | TEMPERATURE: 97.8 F | BODY MASS INDEX: 19.76 KG/M2 | HEART RATE: 60 BPM | SYSTOLIC BLOOD PRESSURE: 125 MMHG

## 2021-07-22 DIAGNOSIS — Z96.652 PRESENCE OF LEFT ARTIFICIAL KNEE JOINT: Chronic | ICD-10-CM

## 2021-07-22 DIAGNOSIS — Z95.0 PRESENCE OF CARDIAC PACEMAKER: Chronic | ICD-10-CM

## 2021-07-22 DIAGNOSIS — Z98.890 OTHER SPECIFIED POSTPROCEDURAL STATES: Chronic | ICD-10-CM

## 2021-07-22 DIAGNOSIS — Z90.2 ACQUIRED ABSENCE OF LUNG [PART OF]: Chronic | ICD-10-CM

## 2021-07-22 DIAGNOSIS — C34.90 MALIGNANT NEOPLASM OF UNSPECIFIED PART OF UNSPECIFIED BRONCHUS OR LUNG: ICD-10-CM

## 2021-07-22 DIAGNOSIS — C34.92 MALIGNANT NEOPLASM OF UNSPECIFIED PART OF LEFT BRONCHUS OR LUNG: ICD-10-CM

## 2021-07-22 PROCEDURE — 99072 ADDL SUPL MATRL&STAF TM PHE: CPT

## 2021-07-22 PROCEDURE — 99214 OFFICE O/P EST MOD 30 MIN: CPT

## 2021-07-22 PROCEDURE — 71046 X-RAY EXAM CHEST 2 VIEWS: CPT | Mod: 26

## 2021-07-23 RX ORDER — OSIMERTINIB 80 1/1
80 TABLET, FILM COATED ORAL
Qty: 30 | Refills: 0 | Status: COMPLETED | COMMUNITY
Start: 2020-09-25 | End: 2021-07-23

## 2021-07-23 NOTE — PHYSICAL EXAM
[Auscultation Breath Sounds / Voice Sounds] : lungs were clear to auscultation bilaterally [Heart Rate And Rhythm] : heart rate was normal and rhythm regular [Heart Sounds] : normal S1 and S2 [Heart Sounds Gallop] : no gallops [Murmurs] : no murmurs [Heart Sounds Pericardial Friction Rub] : no pericardial rub [Examination Of The Chest] : the chest was normal in appearance [Chest Visual Inspection Thoracic Asymmetry] : no chest asymmetry [Diminished Respiratory Excursion] : normal chest expansion [Bowel Sounds] : normal bowel sounds [Abdomen Soft] : soft [Abdomen Tenderness] : non-tender [Abdomen Mass (___ Cm)] : no abdominal mass palpated [Skin Turgor] : normal skin turgor [Skin Color & Pigmentation] : normal skin color and pigmentation [] : no rash

## 2021-07-26 NOTE — DATA REVIEWED
[FreeTextEntry1] : I have independently reviewed patient's CXR:\par CXR today -- bilateral pleural effusions with bibasilar atelectasis left greater than right.\par \par

## 2021-07-26 NOTE — HISTORY OF PRESENT ILLNESS
[FreeTextEntry1] : Mr. DOMENIC HAN, 86 year old male, never smoker, w/ hx of HTN, HLD, Dementia, +T.B. (not treated), abnormal heart rhythm s/p pacemaker ~2010, who presented to PCP c/o productive cough with large amount of thick yellowish sputum x 1 mo in Nov 2019, sent for CXR, then CT scan, he was prescribed Augmentin 875mg BID x 10 days, completed in 01/2020. \par \par PFTs on 03/04/2020: %, FEV1 102%, DLCO 67%.\par \par PET/CT on 03/05/2020:\par - mild activity noted within the medial aspect of the right iliac bone with SUV 2.2 (image 135)\par - asymmetric medial deviation of the right vocal cord, with SUV 2.5\par - 2.8 x 3.5 cm mass in the posterior segment of the MYLES (image 50) with SUV 16.3 in the anterior aspect of the lesion\par - 6 mm LLL nodule is difficult to discern on this study secondary to atelectasis\par - some hypermetabolic activity with SUV of 2.8 is noted in the left hilum. The findings may be inflammatory in nature\par - the ascending aorta is mildly dilated, up to 4.2 cm \par - there is marked prostatic enlargement, with the gland measuring 7.5 cm \par \par Of note, Last seen patient on 02/27/2020, I recommended a CT guided bx of Left apex, PET/CT, and PFTs. Patient was seen by Dr. Jitendra Pang on 03/10/2020 and plan for a bx. Patient called back in April and preferred to postponed his appointment due to COVID-19 crisis. \par \par Patient is now s/p CT guided bx of MYLES nodule on 07/23/[address] Jitendra Pang. Path of MYLES nodule core bx revealed positive for malignant cells. Non-small cell carcinoma, favor adenocarcinoma. + TTF1, consistent with a primary pulmonary adenocarcinoma. Foundation One is pending. \par \par PET/CT on 08/01/2020:\par - 45 x 38 mm mass in the posterior left lung apex (image 45) extending to the pleura, attributed to a combination of malignancy and scar with SUV 16.9, w/o significant change\par - a new 5 mm nodule adjacent to the right minor fissure anterolaterally (image 67)\par - a few areas of subsegmental atelectasis and/or scarring in both lungs\par - there are stable areas of pericardial calcification attributed to prior sites of chronic inflammation\par - there a few stable enlarged mediastinal nodes w/o malignant activity on PET, for example 15 mm in the left retrosternal region (image 50)\par - SUV 2.2 activity in the right iliac bone near the SI joint (image 132), w/o significant change from 03/05/2020 and w/o CT abnormality\par - there are minimal gallstones and mild fatty infiltration of the liver, new, as well as stable severe prostate gland enlargement at 74 mm\par - 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 Echevarria.\par S/P Flex Bronch on 9/1/2020 by Dr. Lr. \par \par Referred to Hem/Onc Dr. Lizbeth Carolina. Patient started on Targrisso 80 mg on 9/30/2020. \par \par CT Chest on 2/20/21:\par - large Rt pleural effusion w/ fluid extending into the Rt major fissure w/ Rt basilar compression atelectasis\par - small Lt pleural effusion\par \par PET/CT on 02/26/2021:\par - stable loculated bilateral pleural effusion are seen, right greater than left\par - hazy activity seen at the left lung base in a small loculated effusion measuring up to SUV 4.1\par - a few stable enlarged mediastinal nodes w/o malignant activity on PET attributed to scarred nodes, for example 15 mm in the left retrosternal region (image 47)\par - minimal gallstones and mild fatty infiltration of the liver, new, as well as stable severe prostate gland enlargement at 74 mm. \par \par Now 4 mo s/p Right VATS, Robotic-assisted, drainage of 1000 ml of serous right pleural effusion, pleural bx, partial decortication of the right lung, placement of PleurX catheter on 03/02/2021. Path of pleura bx revealed chronic pleuritis, acute fibrinous pleuritis. 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 Right pleural fluid revealed negative for malignant cells. \par \par Patient called on 3/19/21 stating Rt PleurX stopped draining, instructed patient to stop Eliquis on 3/22/21 and Rt PleurX removed on 3/25/21 in office.\par \par CXR today -- bilateral pleural effusions with bibasilar atelectasis left greater than right.\par \par Pt presents today for follow up. Pt admits to increasing mucus production, denies cough, SOB or CP. Pt reports he has not followed up with his Oncologist Dr. Carolina and has ran out of Tagrisso.

## 2021-07-26 NOTE — ASSESSMENT
[FreeTextEntry1] : Mr. DOMENIC HAN, 86 year old male, never smoker, w/ hx of HTN, HLD, Dementia, +T.B. (not treated), abnormal heart rhythm s/p pacemaker ~2010, who presented to PCP c/o productive cough with large amount of thick yellowish sputum x 1 mo in Nov 2019, sent for CXR, then CT scan, he was prescribed Augmentin 875mg BID x 10 days, completed in 01/2020. \par \par Now 11 mo s/p Lt VATS Robotic-assisted, LULobectomy w/ en bloc parietal pleurectomy, MLND, wedge rxn of LLL tear on 8/25/2020. 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 Echevarria.\par \par Referred to Hem/Onc Dr. Lizbeth Carolina. Patient started on Targrisso 80 mg on 9/30/2020. \par \par Now 4 mo s/p Right VATS, Robotic-assisted, drainage of 1000 ml of serous right pleural effusion, pleural bx, partial decortication of the right lung, placement of PleurX catheter on 03/02/2021. Path of pleura bx revealed chronic pleuritis, acute fibrinous pleuritis. 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 Right pleural fluid revealed negative for malignant cells. \par \par Rt PleurX removed on 3/25/21 in office.\par \par CXR today -- bilateral pleural effusions with bibasilar atelectasis left greater than right.\par \par I have reviewed the patient's medical records and diagnostic images at time of this office consultation and have made the following recommendation:\par 1. CXR reviewed with pt, RTC PRN\par 2. Follow up with Oncologist Dr. Carolina\par \par \par I personally performed the services described in the documentation, reviewed the documentation recorded by the scribe in my presence and it accurately and completely records my words and actions. \par \par I, Bette Causey NP, am scribing for and the presence of Dr. Sebas Streeter the following sections, HISTORY OF PRESENT ILLNESS, PAST MEDICAL/FAMILY/SOCIAL HISTORY; REVIEW OF SYSTEMS; VITAL SIGNS; PHYSICAL EXAM; DISPOSITION.\par

## 2021-07-26 NOTE — CONSULT LETTER
[FreeTextEntry2] : Dr. Duvall (PCP/Ref) \par Lizbeth Carolina MD (Hem/Onc)  [FreeTextEntry3] : Sebas Streeter MD, MPH \par System Director of Thoracic Surgery \par Director of Comprehensive Lung [facility] [place] \par Professor Cardiovascular & Thoracic Surgery  \par [employer] [facility]\par

## 2021-08-04 NOTE — PROVIDER CONTACT NOTE (OTHER) - ASSESSMENT
EMERGENCY DEPARTMENT HISTORY AND PHYSICAL EXAM      Date: 8/4/2021  Patient Name: Justen Dunn    History of Presenting Illness     Chief Complaint   Patient presents with    Seizure       History Provided By: Patient, son, EMG    HPI: Justen Dunn, 77 y.o. male with a past medical history significant Intracranial aneurysm repair 30 years ago, seizure disorder, hypertension, left-sided complete hemiparesis presents to the ED with cc of decreased responsiveness, questionable seizure-like activity, vomiting. Patient lives with his son who cares for patient, states that he fed his father approximately 56 with father was at baseline, generally speaking patient awake, responsive, able to ambulate with walker. Son checked up on father approximate 6 PM noticed father to be slumped over, upon calling to him father responded however was having significant trouble speaking, sniffing and dysarthria. Called EMS who were concerned about possible CVA, patient presented as a stroke alert. On my evaluation patient somnolent however arouses to voice, answers questions slowly, difficulty understanding speech, patient able to follow commands however does not give full history. History obtained mostly from son. There are no other complaints, changes, or physical findings at this time. PCP: No primary care provider on file. Past History     Past Medical History:  Past Medical History:   Diagnosis Date    CVA (cerebral vascular accident) ([facility] [address]     Seizure [facility])        Past Surgical History:  History reviewed. No pertinent surgical history. Family History:  History reviewed. No pertinent family history.     Social History:  Social History     Tobacco Use    Smoking status: Former Smoker    Smokeless tobacco: Never Used   Substance Use Topics    Alcohol use: Not Currently    Drug use: Never       Allergies:  No Known Allergies      Review of Systems     Review of Systems   Unable to perform ROS: Mental status change       Physical Exam     Physical Exam  Vitals and nursing note reviewed. Constitutional:       Appearance: Normal appearance. He is normal weight. He is ill-appearing. HENT:      Head: Normocephalic. Comments: Old craniotomy scar     Nose: Nose normal.      Mouth/Throat:      Mouth: Mucous membranes are moist.   Eyes:      Extraocular Movements: Extraocular movements intact. Pupils: Pupils are equal, round, and reactive to light. Cardiovascular:      Rate and Rhythm: Normal rate and regular rhythm. Pulses: Normal pulses. Heart sounds: Normal heart sounds. Pulmonary:      Breath sounds: Normal breath sounds. Abdominal:      General: Abdomen is flat. Bowel sounds are normal.      Palpations: Abdomen is soft. Musculoskeletal:         General: No swelling or tenderness. Normal range of motion. Cervical back: Normal range of motion and neck supple. Skin:     General: Skin is warm and dry. Capillary Refill: Capillary refill takes less than 2 seconds. Neurological:      General: No focal deficit present. Mental Status: He is alert and oriented to person, place, and time. Cranial Nerves: No cranial nerve deficit. Sensory: No sensory deficit. Motor: Weakness present. Comments: Left-sided upper extremity contraction, left lower extremity flaccid paralysis.   Right upper extremity 5 out of 5 strength right lower extremity 5 out of 5 strength   Psychiatric:         Mood and Affect: Mood normal.         Behavior: Behavior normal.         Diagnostic Study Results     Labs -     Recent Results (from the past 12 hour(s))   GLUCOSE, POC    Collection Time: 08/04/21 12:53 AM   Result Value Ref Range    Glucose (POC) 104 65 - 117 mg/dL    Performed by [facility]    CBC WITH AUTOMATED DIFF    Collection Time: 08/04/21  1:30 AM   Result Value Ref Range    WBC 12.5 (H) 4.1 - 11.1 K/uL    RBC 4.63 4.10 - 5.70 M/uL    HGB 13.9 12.1 - 17.0 g/dL HCT 43.1 36.6 - 50.3 %    MCV 93.1 80.0 - 99.0 FL    MCH 30.0 26.0 - 34.0 PG    MCHC 32.3 30.0 - 36.5 g/dL    RDW 12.9 11.5 - 14.5 %    PLATELET 101 930 - 943 K/uL    MPV 11.6 8.9 - 12.9 FL    NRBC 0.0 0.0  WBC    ABSOLUTE NRBC 0.00 0.00 - 0.01 K/uL    NEUTROPHILS 88 (H) 32 - 75 %    LYMPHOCYTES 6 (L) 12 - 49 %    MONOCYTES 5 5 - 13 %    EOSINOPHILS 0 0 - 7 %    BASOPHILS 0 0 - 1 %    IMMATURE GRANULOCYTES 1 (H) 0 - 0.5 %    ABS. NEUTROPHILS 10.9 (H) 1.8 - 8.0 K/UL    ABS. LYMPHOCYTES 0.8 0.8 - 3.5 K/UL    ABS. MONOCYTES 0.6 0.0 - 1.0 K/UL    ABS. EOSINOPHILS 0.0 0.0 - 0.4 K/UL    ABS. BASOPHILS 0.0 0.0 - 0.1 K/UL    ABS. IMM. GRANS. 0.2 (H) 0.00 - 0.04 K/UL    DF AUTOMATED     METABOLIC PANEL, COMPREHENSIVE    Collection Time: 08/04/21  1:30 AM   Result Value Ref Range    Sodium 137 136 - 145 mmol/L    Potassium 3.8 3.5 - 5.1 mmol/L    Chloride 104 97 - 108 mmol/L    CO2 29 21 - 32 mmol/L    Anion gap 4 (L) 5 - 15 mmol/L    Glucose 99 65 - 100 mg/dL    BUN 24 (H) 6 - 20 mg/dL    Creatinine 0.78 0.70 - 1.30 mg/dL    BUN/Creatinine ratio 31 (H) 12 - 20      GFR est AA >60 >60 ml/min/1.73m2    GFR est non-AA >60 >60 ml/min/1.73m2    Calcium 8.6 8.5 - 10.1 mg/dL    Bilirubin, total 0.5 0.2 - 1.0 mg/dL    AST (SGOT) 33 15 - 37 U/L    ALT (SGPT) 33 12 - 78 U/L    Alk. phosphatase 70 45 - 117 U/L    Protein, total 7.9 6.4 - 8.2 g/dL    Albumin 3.4 (L) 3.5 - 5.0 g/dL    Globulin 4.5 (H) 2.0 - 4.0 g/dL    A-G Ratio 0.8 (L) 1.1 - 2.2     TROPONIN I    Collection Time: 08/04/21  1:30 AM   Result Value Ref Range    Troponin-I, Qt. <0.05 <0.05 ng/mL       Radiologic Studies -   [unfilled]  CT Results  (Last 48 hours)               08/04/21 0044  CT CODE NEURO HEAD WO CONTRAST Final result    Impression:      No acute intracranial bleed. Bilateral areas of encephalomalacia. Atherosclerosis. A noncontrast head CT does not exclude the possibility of an   acute ischemic stroke.  MRI is more sensitive for evaluation of brain parenchyma. Clinical correlation is recommended. Follow-up as clinically indicated. The   results were called and verbally communicated to Dr. Quique Vera on 8/4/2021 at   1:00 AM.       Dental disease. Please see full report. Narrative:  CT head without contrast       Dose reduction: All CT scans at this facility are performed using dose reduction   optimization techniques as appropriate to a performed exam including the   following: Automated exposure control, adjustments of the mA and/or kV according   to patient size, or use of iterative reconstruction technique. INDICATION: Code neuro       FINDINGS:       No acute intracranial bleed or midline shift. Areas of encephalomalacia in the   right hemisphere and left frontal lobe. Probable white matter small vessel   ischemic changes. Mild ex vacuo dilatation of right lateral ventricle. MRI is   more sensitive for evaluation of brain parenchyma. Atherosclerosis. No acute   skull fracture. Postoperative changes right skull. No fluid in the paranasal   sinuses or mastoid air cells. Caries and periapical infection/abscess involving   multiple teeth. CXR Results  (Last 48 hours)    None          Medical Decision Making and ED Course   I am the first provider for this patient. I reviewed the vital signs, available nursing notes, past medical history, past surgical history, family history and social history. Vital Signs-Reviewed the patient's vital signs.   Patient Vitals for the past 12 hrs:   Temp Pulse Resp BP SpO2   08/04/21 0217  90 26 139/83 99 %   08/04/21 0047 97.7 °F (36.5 °C) 89 17 (!) 177/84 98 %       EKG interpretation: (Preliminary)  Normal sinus rhythm 81, no ST elevations, normal axis      Records Reviewed: Nursing Notes    The patient presents with altered mental status, dysarthria with a differential diagnosis of CVA, intracranial hemorrhage, seizure, hypoglycemia, electrolyte abnormality      Provider Notes (Medical Decision Making):     MDM  Number of Diagnoses or Management Options  Altered mental status, unspecified altered mental status type: new, needed workup  Dysarthria: new, needed workup     Amount and/or Complexity of Data Reviewed  Clinical lab tests: ordered and reviewed  Tests in the radiology section of CPT®: ordered and reviewed  Decide to obtain previous medical records or to obtain history from someone other than the patient: yes  Obtain history from someone other than the patient: yes  Discuss the patient with other providers: yes    Risk of Complications, Morbidity, and/or Mortality  Presenting problems: moderate  Diagnostic procedures: moderate  Management options: moderate       55-year-old male, history of hypertension, intracranial aneurysm repair approximately 30 years ago presents to emergency department for decreased responsiveness, dysarthria, last known well was approximately 10:30pm.      On initial evaluation patient's fingerstick 190, patient somnolent however arousable to voice, does not give full history difficult to understand speech, however follow patient follows commands no obvious new weakness. Patient taken to CT scan, CT resulted by radiology, significant encephalomalacia however did not note any acute bleeds, evidence of aneurysm repair. Patient's mentation improved over time, patient evaluated by [facility] neurology who felt that symptoms may be resulted from seizure, patient not a TPA candidate given history of intracranial hemorrhage. And improving symptoms    Recommends initiating Keppra 500 mg twice daily, admitting patient for observation possible MRI dependent on surgical specifications of aneurysm repair. If MRI not possible recommend CT with IV contrast.    ED Course:   Initial assessment performed. The patients presenting problems have been discussed, and they are in agreement with the care plan formulated and outlined with them.   I have encouraged them to ask questions as Patient has been experiencing productive cough with clear sputum, now presenting with blood-tinged sputum. they arise throughout their visit. ED Course as of Aug 04 0238   Wed Aug 04, 2021   0224 Patient vomited once in ED, Zofran ordered    [PZ]      ED Course User Index  [PZ] Sisi Talley MD         Procedures       Jose Lopez MD  Procedures             Disposition       Admitted        Diagnosis     Clinical Impression:   1. Altered mental status, unspecified altered mental status type    2. Dysarthria        Attestations:    Jose Lopez MD    Please note that this dictation was completed with [employer], the computer voice recognition software. Quite often unanticipated grammatical, syntax, homophones, and other interpretive errors are inadvertently transcribed by the computer software. Please disregard these errors. Please excuse any errors that have escaped final proofreading. Thank you.

## 2022-05-01 NOTE — DISCHARGE NOTE NURSING/CASE MANAGEMENT/SOCIAL WORK - NSDCPNINST_GEN_ALL_CORE
No if you have chest pain, shortness of breath, a fever greater than 100.4, persistent nausea/vomiting or pain not relieved by medication, call your doctor or come back to the emergency room.

## 2024-07-16 NOTE — H&P PST ADULT - MS GEN HX ROS MEA POS PC
back pain
Detail Level: Detailed
Quality 130: Documentation Of Current Medications In The Medical Record: Current Medications Documented

## 2025-03-04 NOTE — DIETITIAN INITIAL EVALUATION ADULT. - PROBLEM SELECTOR PLAN 1
Patient admitted to airborne isolation room on 6N Telemetry unit, and COVID PCR brought to lab by myself; Results pending  - After speaking with patients son, patient has not taken ANY of his medications since Thrusday, 2/25/21.  Restart atenolol ( by cuff now, and patient is v-pacing at 80bpm), flomax at this time.  - Follow-up labs (CBC, BMP, T&S, coags - last dose Eliquis was Thursday 2/25/21), EKG, CXR  - Plan is for Right VATS, Robotic-Assisted Pleural Biopsy, Drainage of Pleural Effusion, Possible Pleurodesis and PleurX Catheter Placement tomorrow w/ Dr. Streeter  - NPO after midnight  - Consent to follow